# Patient Record
Sex: FEMALE | Race: WHITE
[De-identification: names, ages, dates, MRNs, and addresses within clinical notes are randomized per-mention and may not be internally consistent; named-entity substitution may affect disease eponyms.]

---

## 2018-09-13 ENCOUNTER — HOSPITAL ENCOUNTER (OUTPATIENT)
Dept: HOSPITAL 80 - FSGY | Age: 83
Setting detail: OBSERVATION
LOS: 1 days | Discharge: NURSING, REHAB OR LONG-TERM CARE | End: 2018-09-14
Attending: SURGERY | Admitting: SURGERY
Payer: COMMERCIAL

## 2018-09-13 DIAGNOSIS — H35.30: ICD-10-CM

## 2018-09-13 DIAGNOSIS — E03.9: ICD-10-CM

## 2018-09-13 DIAGNOSIS — I27.20: ICD-10-CM

## 2018-09-13 DIAGNOSIS — G47.33: ICD-10-CM

## 2018-09-13 DIAGNOSIS — R09.02: Primary | ICD-10-CM

## 2018-09-13 DIAGNOSIS — G62.9: ICD-10-CM

## 2018-09-13 DIAGNOSIS — M81.0: ICD-10-CM

## 2018-09-13 DIAGNOSIS — E78.5: ICD-10-CM

## 2018-09-13 DIAGNOSIS — I25.10: ICD-10-CM

## 2018-09-13 DIAGNOSIS — E11.9: ICD-10-CM

## 2018-09-13 DIAGNOSIS — C43.72: ICD-10-CM

## 2018-09-13 DIAGNOSIS — K21.9: ICD-10-CM

## 2018-09-13 DIAGNOSIS — E66.01: ICD-10-CM

## 2018-09-13 DIAGNOSIS — I35.0: ICD-10-CM

## 2018-09-13 PROCEDURE — G0378 HOSPITAL OBSERVATION PER HR: HCPCS

## 2018-09-13 PROCEDURE — 88342 IMHCHEM/IMCYTCHM 1ST ANTB: CPT

## 2018-09-13 PROCEDURE — 78195 LYMPH SYSTEM IMAGING: CPT

## 2018-09-13 PROCEDURE — 38500 BIOPSY/REMOVAL LYMPH NODES: CPT

## 2018-09-13 PROCEDURE — 90686 IIV4 VACC NO PRSV 0.5 ML IM: CPT

## 2018-09-13 PROCEDURE — 93005 ELECTROCARDIOGRAM TRACING: CPT

## 2018-09-13 PROCEDURE — G0008 ADMIN INFLUENZA VIRUS VAC: HCPCS

## 2018-09-13 PROCEDURE — 88341 IMHCHEM/IMCYTCHM EA ADD ANTB: CPT

## 2018-09-13 PROCEDURE — 07BJ0ZX EXCISION OF LEFT INGUINAL LYMPHATIC, OPEN APPROACH, DIAGNOSTIC: ICD-10-PCS | Performed by: SURGERY

## 2018-09-13 PROCEDURE — 11100: CPT

## 2018-09-13 PROCEDURE — 97161 PT EVAL LOW COMPLEX 20 MIN: CPT

## 2018-09-13 PROCEDURE — 88307 TISSUE EXAM BY PATHOLOGIST: CPT

## 2018-09-13 PROCEDURE — 0HBLXZX EXCISION OF LEFT LOWER LEG SKIN, EXTERNAL APPROACH, DIAGNOSTIC: ICD-10-PCS | Performed by: SURGERY

## 2018-09-13 PROCEDURE — A9520 TC99 TILMANOCEPT DIAG 0.5MCI: HCPCS

## 2018-09-13 PROCEDURE — 88305 TISSUE EXAM BY PATHOLOGIST: CPT

## 2018-09-13 PROCEDURE — 97116 GAIT TRAINING THERAPY: CPT

## 2018-09-13 PROCEDURE — 97165 OT EVAL LOW COMPLEX 30 MIN: CPT

## 2018-09-13 NOTE — POSTOPPROG
Post Op Note


Date of Operation: 09/13/18


Surgeon: Shannan Velazquez


Anesthesiologist: dianne


Anesthesia: GET(General Endotracheal)


Pre-op Diagnosis: wide local excision malignant melanoma with sln


Post-op Diagnosis: Same


Indication: 81 yo with melanoma on leg


Procedure: Wide local excision LLE and SLN


Findings: low uptake in SLN


Inf/Abcess present in the surg proc area at time of surgery?: No


EBL: Minimal


Specimen(s): 





LLE and sln

## 2018-09-13 NOTE — POSTANESTH
Post Anesthetic Evaluation


Cardiovascular Status: Normal, Stable


Respiratory Status: Similar to Pre-op Cond., Tx Decrease in SpO2, Requires 

Airway Assist


Pain Control: Adequate, Prn Tx Ordered


Nausea/Vomiting Control: Adequate, Prn Tx Ordered


Complications Possibly Related to Anesthesia: None Noted (Being admitted for 

Oxygen requirement, and untreated KUNAL)

## 2018-09-13 NOTE — PDANEPAE
ANE History of Present Illness





81 YO excision melanoma 





ANE Past Medical History





- Cardiovascular History


Hx Hypertension: Yes


Hx Arrhythmias: No


Hx Chest Pain: Yes


Hx Coronary Artery / Peripheral Vascular Disease: Yes


Hx CHF / Valvular Disease: Yes


Hx Palpitations: No





- Pulmonary History


Hx COPD: No


Hx Asthma/Reactive Airway Disease: Yes


Hx Recent Upper Respiratory Infection: No


Hx Oxygen in Use at Home: No


Hx Sleep Apnea: Yes


Sleep Apnea Screening Result - Last Documented: Negative


Pulmonary History Comment: PULM HTN





- Neurologic History


Hx Cerebrovascular Accident: No


Hx Seizures: No


Hx Dementia: No


Neurologic History Comment: PERIPHERAL NEUROPATHY





- Endocrine History


Hx Diabetes: No


Endocrine History Comment: HYPOTHYROID





- Renal History


Hx Renal Disorders: No





- Liver History


Hx Hepatic Disorders: No





- Neurological & Psychiatric Hx


Hx Neurological and Psychiatric Disorders: Yes


Neurological / Psychiatric History Comment: CYMBALTA FOR NEUROPATHY





- Cancer History


Hx Cancer: No





- Congenital Disorder History


Hx Congenital Disorders: No





- GI History


Hx Gastrointestinal Disorders: No





- Other Health History


Other Health History: NEG





- Chronic Pain History


Chronic Pain: No





- Surgical History


Prior Surgeries: COLTON TKA.  CATARACTS.  ANGIOGRAM W/STENT PLACEMENT





ANE Review of Systems


Review of Systems: 








- Exercise capacity


METS (RN): 2 METS





ANE Patient History





- Allergies


Allergies/Adverse Reactions: 








Penicillins Allergy (Unknown, Verified 09/13/18 09:02)


 








- Home Medications


Home Medications: 








Amitriptyline HCl [Elavil 10 mg (*)] 10 - 20 mg PO HS 08/07/15 [Last Taken 09/12 /18]


Cholecalciferol Vit D3 [Vitamin D3 (*)] 1,000 units PO BID@12,21 08/07/15 [Last 

Taken 09/12/18]


DULoxetine [Cymbalta 60 MG (*)] 60 mg PO HS 08/07/15 [Last Taken 09/12/18]


Levothyroxine [Synthroid 175 mcg (*)] 175 mcg PO DAILY@08 08/07/15 [Last Taken 

09/13/18 07:00]


Lisinopril [Zestril 5 mg (*)] 5 mg PO DAILY@08 08/07/15 [Last Taken 09/13/18 07:

00]


TRAMADOL HCL 50 mg PO TID PRN 07/24/16 [Last Taken 09/03/18]


Aspirin [Aspirin 81mg (*)] 81 mg PO HS 07/26/16 [Last Taken 09/12/18]


Multivitamins [Multivitamin (*)] 1 each PO DAILY@12 07/26/16 [Last Taken 09/12/ 18]


Pravastatin Sodium [Pravachol] 40 mg PO HS 07/26/16 [Last Taken 09/12/18]








- NPO status


NPO Status: no food or drink >8 hours


NPO Since - Liquids (Date): 09/12/18


NPO Since - Liquids (Time): 22:30


NPO Since - Solids (Date): 09/12/18


NPO Since - Solids (Time): 21:00





- Anes Hx


Anes Hx: no prior problems





- Smoking Hx


Smoking Status: Former smoker





- Family Anes Hx


Family Hx Anesthesia Complications: NEG





ANE Labs/Vital Signs





- Vital Signs


Blood Pressure: 133/83


Heart Rate: 57


Respiratory Rate: 16


O2 Sat (%): 87


Height: 5 ft 6 in


Weight: 97.069 kg





ANE Physical Exam





- Airway


Mallampati Score: Class 2


Mouth exam: normal dental/mouth exam





- Pulmonary


Pulmonary: no respiratory distress





- Cardiovascular


Cardiovascular: regular rate and rhythym





- ASA Status


ASA Status: IV





ANE Anesthesia Plan


Anesthesia Plan: GA w LMA

## 2018-09-13 NOTE — PDIAF
- Diagnosis


Diagnosis: malignant melanoma


Code Status: Full Code





- Medication Management


Discharge Medications: 


 Medications to Continue on Transfer





Amitriptyline HCl [Elavil 10 mg (*)] 10 - 20 mg PO HS 08/07/15 [Last Taken 09/12 /18]


Cholecalciferol Vit D3 [Vitamin D3 (*)] 1,000 units PO BID@12,21 08/07/15 [Last 

Taken 09/12/18]


DULoxetine [Cymbalta 60 MG (*)] 60 mg PO HS 08/07/15 [Last Taken 09/12/18]


Levothyroxine [Synthroid 175 mcg (*)] 175 mcg PO DAILY@08 08/07/15 [Last Taken 

09/13/18 07:00]


Lisinopril [Zestril 5 mg (*)] 5 mg PO DAILY@08 08/07/15 [Last Taken 09/13/18 07:

00]


TRAMADOL HCL 50 mg PO TID PRN 07/24/16 [Last Taken 09/03/18]


Aspirin [Aspirin 81mg (*)] 81 mg PO HS 07/26/16 [Last Taken 09/12/18]


Multivitamins [Multivitamin (*)] 1 each PO DAILY@12 07/26/16 [Last Taken 09/12/ 18]


Pravastatin Sodium [Pravachol] 40 mg PO HS 07/26/16 [Last Taken 09/12/18]


Gabapentin [Neurontin 300 MG (*)] 600 mg PO BID #0 cap 07/27/16 [Last Taken 09/ 12/18]


traMADol [Ultram 50 mg (*)] 50 mg PO Q4 PRN #30 tab 09/13/18 [Last Taken Unknown

]








Discharge Medications: Refer to the Discharge Home Medication list for PRN 

reason.





- Orders


Services needed: Home Care, Registered Nurse, Physical Therapy, Occupational 

Therapy


Home Care Face to Face: I certify that this patient was under my care and that 

I had the required face-to-face encounter meeting the encounter requirements on 

the discharge day.  My findings support the fact that the patient is homebound 

as defined in


Home Care Face to Face Continued: CMS Chapter 7 Medicare Benefits Manual 30.1.1

, The condition of the patient is such that there exists a normal inability to 

leave home and consequently, leaving home would require a considerable and 

taxing effort.


Diet Recommendation: no restrictions on diet


Additional Instructions: 


Home health care to assist with dressing changes


Brenda, hydrofera blue, ABD, Zoe and [id number] per week and prn





Continue receiving assistance through [facility] as well for non wound care needs





- Follow Up Care


Current Providers and Referrals: 


Braden Hebert MD [Primary Care Provider] - 


Shannan Velazquez MD [Medical Doctor] - follow up in 1 week

## 2018-09-13 NOTE — CPEKG
Test Reason : OPEN

Blood Pressure : ***/*** mmHG

Vent. Rate : 057 BPM     Atrial Rate : 057 BPM

   P-R Int : 158 ms          QRS Dur : 099 ms

    QT Int : 428 ms       P-R-T Axes : 048 -46 013 degrees

   QTc Int : 417 ms

 

Sinus rhythm

Left anterior fascicular block

Borderline T wave abnormalities

 

Confirmed by Nikita Suggs ([id number]) on 9/13/2018 3:20:21 PM

 

Referred By:             Confirmed By:Nikita Suggs

## 2018-09-13 NOTE — PDHOSCONS
History and Physical





- Chief Complaint


post op 





- History of Present Illness


This is a medicine consultation at the request of Dr. Velazquez for medical 

evaluation and management of multiple medical issues including CAD





This is an 81 yo F who is sp wide excision of melanoma on the lower shin being 

observed overnight post operatively given post op hypoxia and complex medical 

history. Patient is currently mildly somnolent but has no complaints, denies 

pain or shortness of breath. She actually was seen with her NC oxygen blowing 

into her eyes, but denied any shortness of breath and O2 at that time was in 

low 90s. She denies any recent changes in her health, she notes she is hungry 

and eager to go home. 





History Information





- Allergies/Home Medication List


Allergies/Adverse Reactions: 








Penicillins Allergy (Unknown, Verified 09/13/18 09:02)


 





Home Medications: 








Amitriptyline HCl [Elavil 10 mg (*)] 10 mg PO HS 08/07/15 [Last Taken 09/12/18]


Cholecalciferol Vit D3 [Vitamin D3 (*)] 2,000 units PO DAILY 08/07/15 [Last 

Taken 09/12/18]


DULoxetine [Cymbalta 60 MG (*)] 60 mg PO HS 08/07/15 [Last Taken 09/12/18]


Lisinopril [Zestril 5 mg (*)] 5 mg PO DAILY@08 08/07/15 [Last Taken 09/13/18 07:

00]


TRAMADOL HCL 50 mg PO HS PRN 07/24/16 [Last Taken 09/03/18]


Aspirin [Aspirin 81mg (*)] 81 mg PO HS 07/26/16 [Last Taken 09/12/18]


Multivitamins [Multivitamin (*)] 1 each PO DAILY@12 07/26/16 [Last Taken 09/12/ 18]


Pravastatin Sodium [Pravachol] 40 mg PO HS 07/26/16 [Last Taken 09/12/18]


Gabapentin [Neurontin 300 MG (*)] 600 mg PO HS 09/13/18 [Last Taken 09/12/18]


Levothyroxine [Synthroid 150 mcg (*)] 150 mcg PO DAILY06 09/13/18 [Last Taken 

Unknown]





I have personally reviewed and updated: family history, medical history, social 

history, surgical history





- Past Medical History


atrial fibrillation, coronary artery disease (s/p stent to LAD), cancer (

melanoma), COPD, hypertension, hyperlipidemia, osteoporosis (with L1 

compression fx)


Additional medical history: moderate to severe pulm htn.  shaina/ohs.  morbid 

obesity with BMI of 34.  peripheral neuropathy.  macular degeneration/

retinopathy with only 10% of vision.  hypothyroid.  gait instability.  

nephrolithiasis





- Surgical History


Reports: coronary stent


Additional surgical history: TKA.  melanoma excision





- Family History


Positive for: CAD (mother w/cabg at age 55)





- Social History


Smoking Status: Former smoker (25 pack years)


Alcohol Use: None


Drug Use: None


Additional social history: , lives in assisted living





Review of Systems


Review of Systems: 





ROS: 10pt was reviewed & negative except for what was stated in HPI & below





Physical Exam


Physical Exam: 

















Temp Pulse Resp BP Pulse Ox


 


 36.8 C   91   16   107/63   91 L


 


 09/13/18 19:30  09/13/18 19:30  09/13/18 19:30  09/13/18 19:30  09/13/18 19:30




















O2 (L/minute)                  3














Constitutional: no apparent distress, appears nourished


Eyes: anicteric sclera


Ears, Nose, Mouth, Throat: moist mucous membranes, hearing normal


Cardiovascular: regular rate and rhythym, no murmur, rub, or gallop, No edema


Respiratory: no respiratory distress, no rales or rhonchi, clear to auscultation


Gastrointestinal: normoactive bowel sounds, soft, non-tender abdomen


Genitourinary: no bladder tenderness


Skin: warm, normal color


Musculoskeletal: full muscle strength


Neurologic: AAOx3


Psychiatric: interacting appropriately, not anxious, not encephalopathic





Lab Data & Imaging Review














POC Glucose  140 mg/dL ()  H  09/13/18  09:25    








Visualized and Interpreted EKG results: Yes


EKG Interpretation: Positive for: normal sinsus rhythm





Assessment & Plan


Assessment: 





81 yo F with MMI being observed s/p wide excision of lower extremity melanoma

## 2018-09-14 VITALS — SYSTOLIC BLOOD PRESSURE: 116 MMHG | DIASTOLIC BLOOD PRESSURE: 71 MMHG

## 2018-09-14 NOTE — PDIAF
- Diagnosis


Diagnosis: malignant melanoma


Code Status: Full Code





- Medication Management


Discharge Medications: 


 Medications to Continue on Transfer





Amitriptyline HCl [Elavil 10 mg (*)] 10 mg PO HS 08/07/15 [Last Taken 09/12/18]


Cholecalciferol Vit D3 [Vitamin D3 (*)] 2,000 units PO DAILY 08/07/15 [Last 

Taken 09/12/18]


DULoxetine [Cymbalta 60 MG (*)] 60 mg PO HS 08/07/15 [Last Taken 09/12/18]


Lisinopril [Zestril 5 mg (*)] 5 mg PO DAILY@08 08/07/15 [Last Taken 09/13/18 07:

00]


TRAMADOL HCL 50 mg PO HS PRN 07/24/16 [Last Taken 09/03/18]


Aspirin [Aspirin 81mg (*)] 81 mg PO HS 07/26/16 [Last Taken 09/12/18]


Multivitamins [Multivitamin (*)] 1 each PO DAILY@12 07/26/16 [Last Taken 09/12/ 18]


Pravastatin Sodium [Pravachol] 40 mg PO HS 07/26/16 [Last Taken 09/12/18]


Gabapentin [Neurontin 300 MG (*)] 600 mg PO HS 09/13/18 [Last Taken 09/12/18]


Levothyroxine [Synthroid 150 mcg (*)] 150 mcg PO DAILY06 09/13/18 [Last Taken 

Unknown]








Discharge Medications: Refer to the Discharge Home Medication list for PRN 

reason.





- Orders


Services needed: Home Care, Registered Nurse, Physical Therapy, Occupational 

Therapy


Home Care Face to Face: I certify that this patient was under my care and that 

I had the required face-to-face encounter meeting the encounter requirements on 

the discharge day.  My findings support the fact that the patient is homebound 

as defined in


Home Care Face to Face Continued: CMS Chapter 7 Medicare Benefits Manual 30.1.1

, The condition of the patient is such that there exists a normal inability to 

leave home and consequently, leaving home would require a considerable and 

taxing effort.


Diet Recommendation: no restrictions on diet


Diet Texture: Regular Texture Diet


Wound Care Instructions: hernesto hydrofera blue ready, ABD, kerlix, coban. 

Change 2x/week


Additional Instructions: 


Home health care to assist with dressing changes


Hernesto, hydrofera blue, ABD, Zoe and [id number] per week and prn





Continue receiving assistance through [facility] as well for non wound care needs





- Follow Up Care


Current Providers and Referrals: 


Braden Hebert MD [Primary Care Provider] - 


Shannan Velazquez MD [Medical Doctor] - follow up in 2 weeks

## 2018-09-14 NOTE — ASMTCMCOM
CM Note

 

CM Note                       

Notes:

Spoke w/RN, pt is discharging back home to [place] with 's support. She will need wound 

care 3/week, CM sent referral to [facility] and she has been accepted.



DC Plan: Home Care/ [facility] ([facility])

 

Date Signed:  09/14/2018 11:57 AM

Electronically Signed By:Svitlana Garcia RN

## 2018-09-14 NOTE — HOSPPROG
Hospitalist Progress Note


Assessment/Plan: 





82y female post op consult. First encounter, chart reviewed. 





#Hypoxia


-post op, improving





#Hx atrial fibrillation


-stable





#Hx coronary artery disease (s/p stent to LAD)


-stable





# cancer (melanoma)


-removal





#Hx COPD


-cont home meds





#Hx hypertension


-stable





#Hx hyperlipidemia


-cont meds





#Hx osteoporosis (with L1 compression fx)


-no pain 





#moderate to severe pulm htn


-no failure





#shaina/ohs





#morbid obesity with BMI of 34





#peripheral neuropathy


-stable





#macular degeneration/retinopathy with only 10% of vision





#hypothyroid








Subjective: Up in chair. Feeling well. No pain.


Objective: 


 Vital Signs











Temp Pulse Resp BP Pulse Ox


 


 36.4 C   52 L  21 H  111/61   90 L


 


 09/14/18 07:11  09/14/18 07:11  09/14/18 07:11  09/14/18 07:11  09/14/18 08:07








 











 09/13/18 09/14/18 09/15/18





 05:59 05:59 05:59


 


Intake Total  500 


 


Output Total  200 0


 


Balance  300 0














- Physical Exam


Constitutional: appears nourished, chronically ill appearing, obese


Eyes: anicteric sclera, No pale conjunctiva, No scleral injection


Ears, Nose, Mouth, Throat: moist mucous membranes, hearing normal, ears appear 

normal


Cardiovascular: regular rate and rhythym, No JVD, No edema


Respiratory: no respiratory distress, no rales or rhonchi, reduced air movement


Gastrointestinal: normoactive bowel sounds, No tenderness, No ascites


Skin: warm, normal color, other (wound)


Musculoskeletal: normal joint ROM, no joint effusions, generalized weakness


Neurologic: AAOx3


Psychiatric: interacting appropriately, not anxious, not encephalopathic, 

thought process linear





ICD10 Worksheet


Patient Problems: 


 Problems











Problem Status Onset


 


Weakness Acute  


 


Pneumonia Acute  


 


Bronchospasm Acute  


 


Severe sepsis Acute

## 2018-09-14 NOTE — ASMTDCNOTE
Case Management Discharge

 

Discharge Order Complete?     Answers:  Yes                                   

Patient to Obtain             Answers:  Independently                         

Medications                                                                   

Transportation Arranged       Answers:  Family/Friends                        

Faxed Final Orders            Answers:  Yes                                   

Agency/Facility Transfer      Answers:  Yes                                   

Report Printed & Faxed to                                                     

Receiving Agency                                                              

Family Notified               Answers:  Yes                                   

Discharge Comments            

Notes:

D/w MD, final orders faxed. Jackie goodman [place] notified. Pt to follow up with MD in 2 weeks.

 

Date Signed:  09/14/2018 05:01 PM

Electronically Signed By:Svitlana Garcia RN

## 2018-09-14 NOTE — SOAPPROG
SOAP Progress Note


Assessment/Plan: 


Assessment/Plan: 81yo F POD#1 s/p WLE L shin and sentinel L inguinal lymph node 

biopsy


Path pending


Supplemental O2 post-op, wean prior to DC


No pain


Wound care - suture removal 2 weeks. Brenda, HFB ready, ABD, kerlix, coban 


DC: home with home wound care RN. 





S: feels well. no pain at surgical site. Does not use oxygen at home. 


O: Laying in bed, comfortable, NAD


No increased WOB. supplemental O2 0.5L


1+ edema BLE


LLE outer bandage removed/replaced. No active bleeding. No surrounding erythema.





Objective: 





 Vital Signs











Temp Pulse Resp BP Pulse Ox


 


 36.7 C   57 L  18   116/71   90 L


 


 09/14/18 15:49  09/14/18 15:49  09/14/18 15:49  09/14/18 15:49  09/14/18 15:49








 











 09/13/18 09/14/18 09/15/18





 05:59 05:59 05:59


 


Intake Total  500 940


 


Output Total  200 400


 


Balance  300 540














ICD10 Worksheet


Patient Problems: 


 Problems











Problem Status Onset


 


Bronchospasm Acute  


 


Pneumonia Acute  


 


Severe sepsis Acute  


 


Weakness Acute

## 2018-09-18 NOTE — GDS
This is This is a discharge summary on



ADMITTING DIAGNOSIS:  Left lower extremity malignant melanoma.



SECONDARY DIAGNOSES:  

1.  Neuropathy of unknown etiology.  

2.  Blindness. 

3.  Aortic stenosis.

4.  Coronary artery disease.

5.  Gastroesophageal reflux disease.

6.  Hyperlipidemia.

7.  Hypothyroidism.

8.  Pulmonary hypertension.

9.  Sleep apnea.

10.  Type 2 diabetes.



REASON FOR ADMISSION:  An 82-year-old woman with a new diagnosis of malignant 
melanoma of her left lower extremity, who presents this time for surgical 
intervention.



HOSPITAL COURSE:  She was taken to the operating room by Dr. Velazquez on 09/13/2018
, for wide local excision of the left lower extremity with a sentinel lymph 
node biopsy of the left groin.  At the time of surgery, there was low uptake of 
the sentinel lymph node.  At the time of dictation pathology is pending.  She 
was kept overnight for hypoxia, pain control, and observation.  By 
postoperative day #1 her pain was well controlled, tolerating regular diet.  
Her oxygen was weaned and she was ready for discharge.



DISCHARGE CONDITION:  She is being discharged back to her assisted living with 
her  in stable condition with home care for wound care.



DISCHARGE MEDICATIONS:  She was instructed to resume home medications.  Please 
see EMR for further details.



DISCHARGE INSTRUCTIONS AND FOLLOWUP:  She had Home Care ordered to assist with 
dressing changes of Brenda, Hydrofera Blue, ABD, Zoe, and Coban 3 times per 
week and as needed.  She will return to the clinic in 2 weeks for postop check.
  Call with worsening symptoms, questions or concerns.





Job #:  [id number]

MTDD

## 2018-09-20 ENCOUNTER — HOSPITAL ENCOUNTER (INPATIENT)
Dept: HOSPITAL 80 - FED | Age: 83
LOS: 8 days | Discharge: NURSING, REHAB OR LONG-TERM CARE | DRG: 862 | End: 2018-09-28
Attending: INTERNAL MEDICINE | Admitting: INTERNAL MEDICINE
Payer: COMMERCIAL

## 2018-09-20 DIAGNOSIS — I89.0: ICD-10-CM

## 2018-09-20 DIAGNOSIS — G93.40: ICD-10-CM

## 2018-09-20 DIAGNOSIS — E11.40: ICD-10-CM

## 2018-09-20 DIAGNOSIS — E03.9: ICD-10-CM

## 2018-09-20 DIAGNOSIS — I10: ICD-10-CM

## 2018-09-20 DIAGNOSIS — I48.91: ICD-10-CM

## 2018-09-20 DIAGNOSIS — Z95.5: ICD-10-CM

## 2018-09-20 DIAGNOSIS — Z96.659: ICD-10-CM

## 2018-09-20 DIAGNOSIS — J44.9: ICD-10-CM

## 2018-09-20 DIAGNOSIS — E66.01: ICD-10-CM

## 2018-09-20 DIAGNOSIS — L03.115: ICD-10-CM

## 2018-09-20 DIAGNOSIS — B95.61: ICD-10-CM

## 2018-09-20 DIAGNOSIS — I25.10: ICD-10-CM

## 2018-09-20 DIAGNOSIS — Z87.891: ICD-10-CM

## 2018-09-20 DIAGNOSIS — N17.9: ICD-10-CM

## 2018-09-20 DIAGNOSIS — J81.0: ICD-10-CM

## 2018-09-20 DIAGNOSIS — E78.5: ICD-10-CM

## 2018-09-20 DIAGNOSIS — H35.30: ICD-10-CM

## 2018-09-20 DIAGNOSIS — T81.4XXA: Primary | ICD-10-CM

## 2018-09-20 DIAGNOSIS — Z87.442: ICD-10-CM

## 2018-09-20 LAB — PLATELET # BLD: 261 10^3/UL (ref 150–400)

## 2018-09-20 NOTE — EDPHY
H & P


Stated Complaint: increased fatigue today


Source: Patient, Family





- Personal History


Current Tetanus Diphtheria and Acellular Pertussis (TDAP): Unsure


Tetanus Vaccine Date: <10 years





- Medical/Surgical History


Hx Asthma: No


Hx Chronic Respiratory Disease: No


Hx Diabetes: No


Hx Cardiac Disease: No


Hx Renal Disease: No


Hx Cirrhosis: No


Hx Alcoholism: No


Hx HIV/AIDS: No


Hx Splenectomy or Spleen Trauma: No


Other PMH: hyperlipidemia, HTN, hypothyroid, cardiac stent, melanoma, 

peripheral neuropathy





- Social History


Smoking Status: Former smoker


Time Seen by Provider: 09/20/18 21:31


HPI/ROS: 





CHIEF COMPLAINT:  Weakness, fatigue





HISTORY OF PRESENT ILLNESS:  The patient is brought in by her family with 

complaints of weakness and fatigue today.  She has a history of chronic 

neuropathy.  She is on a number of pain medications.  She did receive tramadol 

last night at about 2 o'clock in the morning.  Family reports that today the 

patient has been more confused and globally weak.  She is unable to walk and 

she usually would.  The patient was noted to have a dysconjugate gaze according 

to her daughter earlier this evening.  The patient is chronically blind.  The 

patient also has a history of recently diagnosed melanoma on her left leg which 

was surgically resected last week.  The patient denies any acute abdominal pain

, vomiting or diarrhea.





REVIEW OF SYSTEMS:


A comprehensive 10 point review of systems is otherwise negative aside from 

elements mentioned in the history of present illness. (Skyler Wilkins)





- Physical Exam


Exam: 





General Appearance:  Elderly female, no acute distress


Eyes:  Chronic blindness


ENT, Mouth:  Dry mucous membranes


Respiratory:  There are no retractions, lungs are clear to auscultation


Cardiovascular:  Regular rate and rhythm


Gastrointestinal:  Abdomen is soft and nontender, no masses, bowel sounds normal


Neurological:  A&O, normal motor function, normal sensory exam, normal cranial 

nerves


Skin:  Warm and dry, no rashes


Musculoskeletal:  Neck is supple nontender


Extremities:  Surgical incision on left leg clean dry and intact


Psychiatric:  Patient is oriented X 3, there is no agitation (Skyler Wilkins)


Constitutional: 


 Initial Vital Signs











Temperature (C)  36.7 C   09/20/18 21:25


 


Heart Rate  93   09/20/18 21:25


 


Respiratory Rate  18   09/20/18 21:25


 


Blood Pressure  115/76   09/20/18 21:25


 


O2 Sat (%)  94   09/20/18 21:25








 











O2 Delivery Mode               Nasal Cannula


 


O2 (L/minute)                  2














Allergies/Adverse Reactions: 


 





Penicillins Allergy (Unknown, Verified 09/13/18 09:02)


 








Home Medications: 














 Medication  Instructions  Recorded


 


Amitriptyline HCl [Elavil 10 mg 10 mg PO HS 08/07/15





(*)]  


 


Cholecalciferol Vit D3 [Vitamin D3 2,000 units PO DAILY 08/07/15





(*)]  


 


DULoxetine [Cymbalta 60 MG (*)] 60 mg PO HS 08/07/15


 


Lisinopril [Zestril 5 mg (*)] 5 mg PO DAILY@08 08/07/15


 


TRAMADOL HCL 50 mg PO HS PRN 07/24/16


 


Aspirin [Aspirin 81mg (*)] 81 mg PO HS 07/26/16


 


Multivitamins [Multivitamin (*)] 1 each PO DAILY@12 07/26/16


 


Pravastatin Sodium [Pravachol] 40 mg PO HS 07/26/16


 


Gabapentin [Neurontin 300 MG (*)] 600 mg PO HS 09/13/18


 


Levothyroxine [Synthroid 150 mcg 150 mcg PO DAILY06 09/13/18





(*)]  














Medical Decision Making





- Diagnostics


EKG Interpretation: 





ECG time 9:58 p.m..  Sinus rhythm with a rate of 90, there is a left anterior 

fascicular block.  Intervals are normal.  No acute ST or T-wave changes. (Ike Barriga)


Imaging Results: 


 Imaging Impressions





Chest X-Ray  09/20/18 22:04


Impression: Airways disease and left basilar atelectasis.











Other Provider: 





22:00  care assumed from Dr. Wilkins pending laboratory evaluation and other 

studies to determine the cause of the patient's increasing weakness today.





23:45  patient's chest x-ray shows airspace disease, no focal infiltrate.  Does 

have some atelectasis.  Urinalysis is negative for UTI.  There is some evidence 

of renal insufficiency with creatinine 1.4.  I suspect the patient is not 

adequately hydrating.  She does have a leukocytosis but no focal source of 

infection at this time in her chest or her urine.  Will take down the dressing 

of her recent melanoma excision.





I have examined the patient is incision site.  There is a copious amount of 

purulent discharge with surrounding erythema.  This is likely the source of her 

leukocytosis and her increasing weakness.  I have discussed with Dr. Brito, on-

call for Dr. Velazquez.  He will consult on the patient.  I discussed with Dr. Schultz, hospitalist.  He will admit the patient to his service.  Patient does 

meet SIRS criteria.  A lactic acid has been sent in addition to blood cultures 

and wound cultures.  Lactic acid is unremarkable.  Patient has received Ancef 

IV.  Patient be admitted to the floor.   (Ike Barriga)





- Data Points


Laboratory Results: 


 Laboratory Results





 09/20/18 21:35 





 09/20/18 21:35 





 











  09/20/18 09/20/18 09/20/18





  23:05 21:35 21:35


 


WBC      





    


 


RBC      





    


 


Hgb      





    


 


Hct      





    


 


MCV      





    


 


MCH      





    


 


MCHC      





    


 


RDW      





    


 


Plt Count      





    


 


MPV      





    


 


Neut % (Auto)      





    


 


Lymph % (Auto)      





    


 


Mono % (Auto)      





    


 


Eos % (Auto)      





    


 


Baso % (Auto)      





    


 


Nucleat RBC Rel Count      





    


 


Absolute Neuts (auto)      





    


 


Absolute Lymphs (auto)      





    


 


Absolute Monos (auto)      





    


 


Absolute Eos (auto)      





    


 


Absolute Basos (auto)      





    


 


Absolute Nucleated RBC      





    


 


Immature Gran %      





    


 


Immature Gran #      





    


 


Sodium      134 mEq/L L mEq/L





     (135-145) 


 


Potassium      4.3 mEq/L mEq/L





     (3.3-5.0) 


 


Chloride      93 mEq/L L mEq/L





     () 


 


Carbon Dioxide      27 mEq/l mEq/l





     (22-31) 


 


Anion Gap      14 mEq/L mEq/L





     (8-16) 


 


BUN      22 mg/dL mg/dL





     (7-23) 


 


Creatinine      1.4 mg/dL H mg/dL





     (0.6-1.0) 


 


Estimated GFR      36 





    


 


Glucose      176 mg/dL H mg/dL





     () 


 


Calcium      9.5 mg/dL mg/dL





     (8.5-10.4) 


 


Total Bilirubin    1.5 mg/dL H mg/dL  





    (0.1-1.4)  


 


Conjugated Bilirubin    0.5 mg/dL mg/dL  





    (0.0-0.5)  


 


Unconjugated Bilirubin    1.0 mg/dL mg/dL  





    (0.0-1.1)  


 


AST    31 IU/L IU/L  





    (14-46)  


 


ALT    37 IU/L IU/L  





    (9-52)  


 


Alkaline Phosphatase    108 IU/L IU/L  





    ()  


 


Total Protein    7.8 g/dL g/dL  





    (6.3-8.2)  


 


Albumin    4.4 g/dL g/dL  





    (3.5-5.0)  


 


Urine Color  BRIELLE     





    


 


Urine Appearance  MODERATELY TURBID     





    


 


Urine pH  5.0     





   (5.0-7.5)   


 


Ur Specific Gravity  1.027     





   (1.002-1.030)   


 


Urine Protein  1+  H     





   (NEGATIVE)   


 


Urine Ketones  NEGATIVE     





   (NEGATIVE)   


 


Urine Blood  NEGATIVE     





   (NEGATIVE)   


 


Urine Nitrate  NEGATIVE     





   (NEGATIVE)   


 


Urine Bilirubin  NEGATIVE     





   (NEGATIVE)   


 


Urine Urobilinogen  4.0 EU H EU    





   (0.2-1.0)   


 


Ur Leukocyte Esterase  NEGATIVE     





   (NEGATIVE)   


 


Urine RBC  1-3 /hpf /hpf    





   (0-3)   


 


Urine WBC  1-3 /hpf /hpf    





   (0-3)   


 


Ur Epithelial Cells  1+ /lpf /lpf    





   (NONE-1+)   


 


Hyaline Casts  1-5 /lpf /lpf    





   (0-1)   


 


Urine Mucus  4+ /lpf H /lpf    





   (NONE-1+)   


 


Urine Glucose  NEGATIVE     





   (NEGATIVE)   














  09/20/18





  21:35


 


WBC  16.37 10^3/uL H 10^3/uL





   (3.80-9.50) 


 


RBC  5.71 10^6/uL H 10^6/uL





   (4.18-5.33) 


 


Hgb  17.6 g/dL H g/dL





   (12.6-16.3) 


 


Hct  52.8 % H %





   (38.0-47.0) 


 


MCV  92.5 fL fL





   (81.5-99.8) 


 


MCH  30.8 pg pg





   (27.9-34.1) 


 


MCHC  33.3 g/dL g/dL





   (32.4-36.7) 


 


RDW  14.6 % %





   (11.5-15.2) 


 


Plt Count  261 10^3/uL 10^3/uL





   (150-400) 


 


MPV  8.6 fL L fL





   (8.7-11.7) 


 


Neut % (Auto)  77.1 % H %





   (39.3-74.2) 


 


Lymph % (Auto)  12.9 % L %





   (15.0-45.0) 


 


Mono % (Auto)  7.4 % %





   (4.5-13.0) 


 


Eos % (Auto)  1.2 % %





   (0.6-7.6) 


 


Baso % (Auto)  0.5 % %





   (0.3-1.7) 


 


Nucleat RBC Rel Count  0.0 % %





   (0.0-0.2) 


 


Absolute Neuts (auto)  12.61 10^3/uL H 10^3/uL





   (1.70-6.50) 


 


Absolute Lymphs (auto)  2.11 10^3/uL 10^3/uL





   (1.00-3.00) 


 


Absolute Monos (auto)  1.21 10^3/uL H 10^3/uL





   (0.30-0.80) 


 


Absolute Eos (auto)  0.20 10^3/uL 10^3/uL





   (0.03-0.40) 


 


Absolute Basos (auto)  0.09 10^3/uL 10^3/uL





   (0.02-0.10) 


 


Absolute Nucleated RBC  0.00 10^3/uL 10^3/uL





   (0-0.01) 


 


Immature Gran %  0.9 % %





   (0.0-1.1) 


 


Immature Gran #  0.15 10^3/uL H 10^3/uL





   (0.00-0.10) 


 


Sodium  





  


 


Potassium  





  


 


Chloride  





  


 


Carbon Dioxide  





  


 


Anion Gap  





  


 


BUN  





  


 


Creatinine  





  


 


Estimated GFR  





  


 


Glucose  





  


 


Calcium  





  


 


Total Bilirubin  





  


 


Conjugated Bilirubin  





  


 


Unconjugated Bilirubin  





  


 


AST  





  


 


ALT  





  


 


Alkaline Phosphatase  





  


 


Total Protein  





  


 


Albumin  





  


 


Urine Color  





  


 


Urine Appearance  





  


 


Urine pH  





  


 


Ur Specific Gravity  





  


 


Urine Protein  





  


 


Urine Ketones  





  


 


Urine Blood  





  


 


Urine Nitrate  





  


 


Urine Bilirubin  





  


 


Urine Urobilinogen  





  


 


Ur Leukocyte Esterase  





  


 


Urine RBC  





  


 


Urine WBC  





  


 


Ur Epithelial Cells  





  


 


Hyaline Casts  





  


 


Urine Mucus  





  


 


Urine Glucose  





  











Medications Given: 


 





Cefazolin Sodium/Dextrose (Ancef 2 Gm)  100 mls @ 200 mls/hr IV Q8H PRIYANKA


   PRN Reason: Protocol


   Stop: 10/21/18 00:59


   Last Admin: 09/21/18 01:39 Dose:  100 mls





Discontinued Medications





Sodium Chloride (Ns)  1,000 mls @ 0 mls/hr IV ONCE ONE


   PRN Reason: Wide Open


   Stop: 09/21/18 01:00


   Last Admin: 09/21/18 01:39 Dose:  1,000 mls








Departure





- Departure


Disposition: [facility]


Clinical Impression: 


 Wound infection, Weakness





Condition: Fair

## 2018-09-21 LAB
INR PPP: 1.22 (ref 0.83–1.16)
PLATELET # BLD: 210 10^3/UL (ref 150–400)
PROTHROMBIN TIME: 15.6 SEC (ref 12–15)

## 2018-09-21 RX ADMIN — ASPIRIN 81 MG SCH MG: 81 TABLET ORAL at 21:57

## 2018-09-21 RX ADMIN — Medication SCH MLS: at 09:17

## 2018-09-21 RX ADMIN — AMITRIPTYLINE HYDROCHLORIDE SCH MG: 10 TABLET, FILM COATED ORAL at 23:03

## 2018-09-21 RX ADMIN — VANCOMYCIN HYDROCHLORIDE SCH MLS: 1 INJECTION, POWDER, LYOPHILIZED, FOR SOLUTION INTRAVENOUS at 15:47

## 2018-09-21 RX ADMIN — ACETAMINOPHEN PRN MG: 325 TABLET ORAL at 13:22

## 2018-09-21 RX ADMIN — GABAPENTIN SCH MG: 300 CAPSULE ORAL at 21:58

## 2018-09-21 RX ADMIN — PRAVASTATIN SODIUM SCH MG: 40 TABLET ORAL at 21:58

## 2018-09-21 RX ADMIN — SODIUM CHLORIDE SCH MLS: 900 INJECTION, SOLUTION INTRAVENOUS at 15:56

## 2018-09-21 RX ADMIN — Medication SCH MLS: at 01:39

## 2018-09-21 NOTE — HOSPPROG
Hospitalist Progress Note


Assessment/Plan: 


Prolonged service, direct patient care, in addition to the time spent by the 

original history and physical by Dr. Schultz, face-to-face with the patient 

and on 2nd encounter with her , at bedside, for 35 total minutes of time 

spent between 9:45 a.m. - 10:00 a.m., and 3:10 p.m. - 3:40 p.m., addressing the 

following:





-patient originally remained significantly lethargic, but was fully oriented, 

most likely secondary to 2 doses of tramadol administered by the patient's 

 on the evening of presentation, to treat left lower extremity pain


-on that original exam, the patient irregular heart rate, clear breath sounds 

bilaterally, covered wound on her left lower extremity, bowel sounds present, 

and reported that she otherwise felt well


-explained the the plan including ongoing IV Ancef, ongoing wound assessment, 

labs are pending at that time


-discussed with Dr. Velazquez, she reported to me that the wound bed appeared to be 

healing but the surrounding area certainly appeared cellulitic, Xeroform Blue 

placed in the wound bed





-patient persistently febrile this afternoon, evaluated the patient at bedside 

with her  present, she appears sweaty, mildly tachypneic, denies any 

cough, denies any pain in the left lower extremity


-she is alert awake oriented x3, and although she appears somewhat anxious, her 

concentration 7/7


-I am concerned that the patient is developing worsening systemic infection, 

although she does not currently meet sepsis criteria, and given that her white 

blood cell count has risen to 21,000 despite adequate dosing of IV Ancef and 

she is currently febrile, I am concerned that she may have MRSA and I recommend 

to the patient her  that we escalate the antibiotic coverage to 

vancomycin 1.5 g twice daily, initiate IV fluids to cover insensible losses, 

monitor white blood cell count closely, upgraded to inpatient admission status 

reasonable medical necessity as her anticipated length stay is greater than 48 

hr for worsening cellulitis not responding to IV Ancef therapy, requiring 

broadening of antibiotic therapy and close surgical reassessment


-treat supportively with Tylenol, 1 dose of ibuprofen now, ice pack, cool cloth





Objective: 


 Vital Signs











Temp Pulse Resp BP Pulse Ox


 


 38.6 C H  90   28 H  116/71   88 L


 


 09/21/18 15:14  09/21/18 15:14  09/21/18 15:14  09/21/18 15:14  09/21/18 15:14








 Laboratory Results





 09/21/18 07:50 





 09/21/18 07:50 





 











 09/20/18 09/21/18 09/22/18





 05:59 05:59 05:59


 


Intake Total  1105 


 


Output Total  50 


 


Balance  1055 








 











PT  15.6 SEC (12.0-15.0)  H  09/21/18  01:01    


 


INR  1.22  (0.83-1.16)  H  09/21/18  01:01    














ICD10 Worksheet


Patient Problems: 


 Problems











Problem Status Onset


 


Weakness Acute  


 


Pneumonia Acute  


 


Bronchospasm Acute  


 


Severe sepsis Acute  


 


Wound infection Acute

## 2018-09-21 NOTE — PDGENHP
History and Physical





- Chief Complaint


Malaise





- History of Present Illness


81 yo F w/ hx of COPD, HTN, CAD, and LLE melanoma with recent excision presents 

with malaise. Patient is mildly confused at the time of my evaluation and 

obtaining a history is difficult. She is able to tell me that she came to the 

hospital today because she felt unwell. She cannot provide any further 

specifics. She denies fever, chills, chest pain, shortness of breath, dysuria, 

and LLE pain. She is A&Ox2 during my evaluation and is displaying 

inattentiveness during our conversation, frequently unable to finish sentences 

that she has started.





In the ED her LLE wound appears infected. Surgical sutures remain in place but 

wound bed shows purulence and surrounding erythema. Evaluation in the ED is 

notable for leukocytosis but otherwise mostly unremarkable.





Case discussed with ED physician Dr. Barriga; records reviewed in EMR.





History Information





- Allergies/Home Medication List


Allergies/Adverse Reactions: 








Penicillins Allergy (Unknown, Verified 09/13/18 09:02)


 





Home Medications: 








Amitriptyline HCl [Elavil 10 mg (*)] 10 mg PO HS 08/07/15 [Last Taken 09/12/18]


Cholecalciferol Vit D3 [Vitamin D3 (*)] 2,000 units PO DAILY 08/07/15 [Last 

Taken 09/12/18]


DULoxetine [Cymbalta 60 MG (*)] 60 mg PO HS 08/07/15 [Last Taken 09/12/18]


Lisinopril [Zestril 5 mg (*)] 5 mg PO DAILY@08 08/07/15 [Last Taken 09/13/18 07:

00]


TRAMADOL HCL 50 mg PO HS PRN 07/24/16 [Last Taken 09/03/18]


Aspirin [Aspirin 81mg (*)] 81 mg PO HS 07/26/16 [Last Taken 09/12/18]


Multivitamins [Multivitamin (*)] 1 each PO DAILY@12 07/26/16 [Last Taken 09/12/ 18]


Pravastatin Sodium [Pravachol] 40 mg PO HS 07/26/16 [Last Taken 09/12/18]


Gabapentin [Neurontin 300 MG (*)] 600 mg PO HS 09/13/18 [Last Taken 09/12/18]


Levothyroxine [Synthroid 150 mcg (*)] 150 mcg PO DAILY06 09/13/18 [Last Taken 

Unknown]





I have personally reviewed and updated: family history, medical history





- Past Medical History


atrial fibrillation, coronary artery disease (s/p stent to LAD), cancer (

melanoma), COPD, hypertension, hyperlipidemia, osteoporosis (with L1 

compression fx)


Additional medical history: moderate to severe pulm htn.  shaina/ohs.  morbid 

obesity with BMI of 34.  peripheral neuropathy.  macular degeneration/

retinopathy with only 10% of vision.  hypothyroid.  gait instability.  

nephrolithiasis





- Surgical History


Reports: coronary stent


Additional surgical history: TKA.  melanoma excision





- Family History


Positive for: CAD (mother w/cabg at age 55)





- Social History


Smoking Status: Former smoker


Additional social history: , lives in assisted living





Review of Systems


Review of Systems: 





ROS: 10pt was reviewed & negative except for what was stated in HPI & below





Physical Exam


Physical Exam: 

















Temp Pulse Resp BP Pulse Ox


 


 36.7 C   90   22 H  115/64   92 


 


 09/20/18 21:25  09/20/18 23:26  09/20/18 23:26  09/20/18 23:26  09/20/18 23:26











Constitutional: chronically ill appearing, obese


Eyes: PERRL, EOMI


Ears, Nose, Mouth, Throat: moist mucous membranes, no oral mucosal ulcers


Cardiovascular: regular rate and rhythym, systolic murmur


Respiratory: no respiratory distress, clear to auscultation


Gastrointestinal: normoactive bowel sounds, soft, non-tender abdomen


Skin: warm, other (LLE surgical wound, partially close with sutures in place. 

Wound bed w/ purulence and surrounding erythema)


Musculoskeletal: full muscle strength, no muscle tenderness


Neurologic: AAOx3, CN II-XII Intact


Psychiatric: encephalopathic, poor memory





Lab Data & Imaging Review





 09/20/18 21:35





 09/20/18 21:35














WBC  16.37 10^3/uL (3.80-9.50)  H  09/20/18  21:35    


 


RBC  5.71 10^6/uL (4.18-5.33)  H  09/20/18  21:35    


 


Hgb  17.6 g/dL (12.6-16.3)  H  09/20/18  21:35    


 


Hct  52.8 % (38.0-47.0)  H  09/20/18  21:35    


 


MCV  92.5 fL (81.5-99.8)   09/20/18  21:35    


 


MCH  30.8 pg (27.9-34.1)   09/20/18  21:35    


 


MCHC  33.3 g/dL (32.4-36.7)   09/20/18  21:35    


 


RDW  14.6 % (11.5-15.2)   09/20/18  21:35    


 


Plt Count  261 10^3/uL (150-400)   09/20/18  21:35    


 


MPV  8.6 fL (8.7-11.7)  L  09/20/18  21:35    


 


Neut % (Auto)  77.1 % (39.3-74.2)  H  09/20/18  21:35    


 


Lymph % (Auto)  12.9 % (15.0-45.0)  L  09/20/18  21:35    


 


Mono % (Auto)  7.4 % (4.5-13.0)   09/20/18  21:35    


 


Eos % (Auto)  1.2 % (0.6-7.6)   09/20/18  21:35    


 


Baso % (Auto)  0.5 % (0.3-1.7)   09/20/18  21:35    


 


Nucleat RBC Rel Count  0.0 % (0.0-0.2)   09/20/18  21:35    


 


Absolute Neuts (auto)  12.61 10^3/uL (1.70-6.50)  H  09/20/18  21:35    


 


Absolute Lymphs (auto)  2.11 10^3/uL (1.00-3.00)   09/20/18  21:35    


 


Absolute Monos (auto)  1.21 10^3/uL (0.30-0.80)  H  09/20/18  21:35    


 


Absolute Eos (auto)  0.20 10^3/uL (0.03-0.40)   09/20/18  21:35    


 


Absolute Basos (auto)  0.09 10^3/uL (0.02-0.10)   09/20/18  21:35    


 


Absolute Nucleated RBC  0.00 10^3/uL (0-0.01)   09/20/18  21:35    


 


Immature Gran %  0.9 % (0.0-1.1)   09/20/18  21:35    


 


Immature Gran #  0.15 10^3/uL (0.00-0.10)  H  09/20/18  21:35    


 


Sodium  134 mEq/L (135-145)  L  09/20/18  21:35    


 


Potassium  4.3 mEq/L (3.3-5.0)   09/20/18  21:35    


 


Chloride  93 mEq/L ()  L  09/20/18  21:35    


 


Carbon Dioxide  27 mEq/l (22-31)   09/20/18  21:35    


 


Anion Gap  14 mEq/L (8-16)   09/20/18  21:35    


 


BUN  22 mg/dL (7-23)   09/20/18  21:35    


 


Creatinine  1.4 mg/dL (0.6-1.0)  H  09/20/18  21:35    


 


Estimated GFR  36   09/20/18  21:35    


 


Glucose  176 mg/dL ()  H  09/20/18  21:35    


 


Calcium  9.5 mg/dL (8.5-10.4)   09/20/18  21:35    


 


Urine Color  BRIELLE   09/20/18  23:05    


 


Urine Appearance  MODERATELY TURBID   09/20/18  23:05    


 


Urine pH  5.0  (5.0-7.5)   09/20/18  23:05    


 


Ur Specific Gravity  1.027  (1.002-1.030)   09/20/18  23:05    


 


Urine Protein  1+  (NEGATIVE)  H  09/20/18  23:05    


 


Urine Ketones  NEGATIVE  (NEGATIVE)   09/20/18  23:05    


 


Urine Blood  NEGATIVE  (NEGATIVE)   09/20/18  23:05    


 


Urine Nitrate  NEGATIVE  (NEGATIVE)   09/20/18  23:05    


 


Urine Bilirubin  NEGATIVE  (NEGATIVE)   09/20/18  23:05    


 


Urine Urobilinogen  4.0 EU (0.2-1.0)  H  09/20/18  23:05    


 


Ur Leukocyte Esterase  NEGATIVE  (NEGATIVE)   09/20/18  23:05    


 


Urine RBC  1-3 /hpf (0-3)   09/20/18  23:05    


 


Urine WBC  1-3 /hpf (0-3)   09/20/18  23:05    


 


Ur Epithelial Cells  1+ /lpf (NONE-1+)   09/20/18  23:05    


 


Hyaline Casts  1-5 /lpf (0-1)   09/20/18  23:05    


 


Urine Mucus  4+ /lpf (NONE-1+)  H  09/20/18  23:05    


 


Urine Glucose  NEGATIVE  (NEGATIVE)   09/20/18  23:05    








Imaging Review: 





 Imaging Impressions





Chest X-Ray  09/20/18 22:04


Impression: Airways disease and left basilar atelectasis.














Assessment & Plan


Assessment: 





81 yo F w/ hx of COPD, HTN, CAD, and LLE melanoma with recent excision presents 

with malaise and likely wound infection.


Plan: 


1. LLE surgical site infection - Wound bed with purulence and surrounding 

erythema; overall this appears mild at this time with elevated WBC but no clear 

sepsis physiology at this time. She underwent excision of LLE melanoma on 9/13 

with Dr. Velazquez.


- Cefazolin 2g IV q8h


- Surgery service consulted, appreciate assistance


- Blood and wound cultures pending


2. Acute, metabolic encephalopathy - Presumably 2/2 #1; patient A&Ox2 on my 

evaluation and showing decreased attention and memory.


- Acute management as above


- Avoid centrally acting medications


3. COPD - No evidence of acute exacerbation.


4. CAD - With hx of prior LAD stent; ECG(personally interpreted) without signs 

of acute ischemia.


5. HTN - Continue home medications pending reconciliation


6. Macular degeneration - With poor vision at baseline





Diet - NPO pending surgical evaluation


Code - Full


Ppx - SCDs


Dispo - Admit under observation status

## 2018-09-21 NOTE — SOAPPROG
SOAP Progress Note


Assessment/Plan: 


Assessment/Plan: 83yo F s/p L inguinal SLN bx and WLE of LLE for melanoma. 

Admitted with cellulitis LLE


Path with clear margins


IV antibiotics


Wound care - hernesto, hydrofera blue ready, absorptive outer bandage


Will follow daily


No OR debridement at this time - regular diet ok


Appreciate hospitalist management of comorbidities


Full consult note to follow





S: hungry and thirsty. Sick of being in the hospital!


O: laying in bed, comfortable, NAD


No increased WOB


3+ PE LLE


Wound with pale granulation tissue present. Sutures intact. Surrounding 

erythema and warmth extending to level of knee


+DP and PT pulses LLE


Objective: 





 Vital Signs











Temp Pulse Resp BP Pulse Ox


 


 37.5 C   77   16   121/80 H  92 


 


 09/21/18 07:42  09/21/18 07:42  09/21/18 07:42  09/21/18 07:42  09/21/18 07:42








 Laboratory Results





 09/21/18 07:50 





 09/21/18 07:50 





 











 09/20/18 09/21/18 09/22/18





 05:59 05:59 05:59


 


Intake Total  1105 


 


Output Total  50 


 


Balance  1055 








 











PT  15.6 SEC (12.0-15.0)  H  09/21/18  01:01    


 


INR  1.22  (0.83-1.16)  H  09/21/18  01:01    














ICD10 Worksheet


Patient Problems: 


 Problems











Problem Status Onset


 


Weakness Acute  


 


Wound infection Acute  


 


Bronchospasm Acute  


 


Pneumonia Acute  


 


Severe sepsis Acute

## 2018-09-22 LAB — PLATELET # BLD: 231 10^3/UL (ref 150–400)

## 2018-09-22 RX ADMIN — VITAMIN D, TAB 1000IU (100/BT) SCH UNITS: 25 TAB at 10:16

## 2018-09-22 RX ADMIN — ASPIRIN 81 MG SCH MG: 81 TABLET ORAL at 21:30

## 2018-09-22 RX ADMIN — Medication SCH MLS: at 21:30

## 2018-09-22 RX ADMIN — ACETAMINOPHEN PRN MG: 325 TABLET ORAL at 14:00

## 2018-09-22 RX ADMIN — THERA TABS SCH EACH: TAB at 10:16

## 2018-09-22 RX ADMIN — AMITRIPTYLINE HYDROCHLORIDE SCH MG: 10 TABLET, FILM COATED ORAL at 21:30

## 2018-09-22 RX ADMIN — PRAVASTATIN SODIUM SCH MG: 40 TABLET ORAL at 21:30

## 2018-09-22 RX ADMIN — LEVOTHYROXINE SODIUM SCH MCG: 150 TABLET ORAL at 05:18

## 2018-09-22 RX ADMIN — GABAPENTIN SCH MG: 300 CAPSULE ORAL at 21:30

## 2018-09-22 RX ADMIN — SODIUM CHLORIDE SCH MLS: 900 INJECTION, SOLUTION INTRAVENOUS at 03:34

## 2018-09-22 RX ADMIN — VANCOMYCIN HYDROCHLORIDE SCH MLS: 1 INJECTION, POWDER, LYOPHILIZED, FOR SOLUTION INTRAVENOUS at 03:34

## 2018-09-22 RX ADMIN — ACETAMINOPHEN PRN MG: 325 TABLET ORAL at 10:16

## 2018-09-22 RX ADMIN — VANCOMYCIN HYDROCHLORIDE SCH MLS: 1 INJECTION, POWDER, LYOPHILIZED, FOR SOLUTION INTRAVENOUS at 14:30

## 2018-09-22 NOTE — ASMTCMCOM
CM Note

 

CM Note                       

Notes:

Spoke w/pt and  re; dc poc. PT/OT are recommending SNF, pt reluctantly agrees but is a two 

person assist and incontinent of stool. Would like referrals sent to [facility].



DC Plan: SNF

 

Date Signed:  09/22/2018 04:35 PM

Electronically Signed By:Svitlana Garcia RN

## 2018-09-22 NOTE — CPEKG
Test Reason : OPEN

Blood Pressure : ***/*** mmHG

Vent. Rate : 090 BPM     Atrial Rate : 090 BPM

   P-R Int : 143 ms          QRS Dur : 094 ms

    QT Int : 346 ms       P-R-T Axes : 061 -52 055 degrees

   QTc Int : 424 ms

 

Sinus rhythm

Left anterior fascicular block

 

Confirmed by Skyler Wilkins ([id number]) on 9/22/2018 9:42:36 AM

 

Referred By:             Confirmed By:Skyler Wilkins

## 2018-09-22 NOTE — PDMN
Medical Necessity


Medical necessity: Change to inpt as of 9/21/18 @ 4521. Pt meets inpt criteria 

per MD order and MCG M-70, Cellulitis. 81 y/o w/LLE melanoma w/recent excision 

admitted w/LLE surgical site infection, acute metabolic encephalopathy, 

persistent fevers despite IV abx requiring broadening of ABX therapy, concern 

for systemic infection,wound cultures + for staph aureus, blood cultures pending

, anticipate >2MN for management of worsening cellulitis and close surgical 

reassessment.

## 2018-09-22 NOTE — SOAPPROG
SOAP Progress Note


Assessment/Plan: 


Assessment:


no real complaints but looks flushed


leg wound stable with decreased cellulitis





does not appear ready for home altho shes wants to go























Plan:continue abx





09/22/18 13:19





Objective: 





 Vital Signs











Temp Pulse Resp BP Pulse Ox


 


 37.4 C   91   18   166/83 H  90 L


 


 09/22/18 11:17  09/22/18 11:17  09/22/18 11:17  09/22/18 11:17  09/22/18 11:17








 Laboratory Results





 09/22/18 10:22 





 09/22/18 10:22 





 











 09/21/18 09/22/18 09/23/18





 05:59 05:59 05:59


 


Intake Total  2050 


 


Output Total  275 


 


Balance  1775 








 











PT  15.6 SEC (12.0-15.0)  H  09/21/18  01:01    


 


INR  1.22  (0.83-1.16)  H  09/21/18  01:01    














ICD10 Worksheet


Patient Problems: 


 Problems











Problem Status Onset


 


Weakness Acute  


 


Wound infection Acute  


 


Bronchospasm Acute  


 


Pneumonia Acute  


 


Severe sepsis Acute

## 2018-09-22 NOTE — HOSPPROG
Hospitalist Progress Note


Assessment/Plan: 





DIAGNOSES: 


* surgical wound infection with MSSA, surrounding cellulitis


* acute encephalopathy due to above, possibly also medications


* diabetes mellitus with adequate sugar control at this time


* Previously diagnosed as pre diabetes but has high enough sugars here that 

this is diabetes


* Follow-up with primary care and diet exercise and lifestyle measures are 

indicated; medication may not be indicated at this time but will check 

hemoglobin A1c


* history of sleep apnea COPD and pulmonary hypertension


* recent melanoma excision


* senile macular degeneration with decreased vision


* hypothyroidism on therapy





PLANS:


* Change antibiotics to cover for MSSA


* Continue wound care and leg elevation


* Will stop IV hydration when she begins to drink fluids okay


* Continue to follow sugars


* Check hemoglobin A1c








SUBJECTIVE:


Denies much in the way of any leg pain


Denies chills or sweats


Very weak





OBJECTIVE


Vitals reviewed:  Now afebrile with stable vitals





Exam:


alert mildly disoriented, but more normally interactive and alert today


skin warm dry color ok


resps not labored


lungs clear BSs


heart regular


abd soft nondistended nontender, bowel sounds present


limbs warm, no edema; her left leg has cellulitis that persist around her 

recent surgical incision wound though this appears better than yesterday.  

There is still no evidence of any abscess or devitalized tissue.  The wound is 

open centrally and the wound bed appears clean with minimal drainage at this 

time





iv site ok





Laboratory data:


White blood cell count down to 16,000 stable hemoglobin and platelets


Stable chemistries, adequate blood sugar control











Objective: 


 Vital Signs











Temp Pulse Resp BP Pulse Ox


 


 36.9 C   81   20   135/67 H  90 L


 


 09/22/18 16:00  09/22/18 16:00  09/22/18 16:00  09/22/18 16:00  09/22/18 16:00








 Laboratory Results





 09/22/18 10:22 





 09/22/18 10:22 





 











 09/21/18 09/22/18 09/23/18





 06:59 06:59 06:59


 


Intake Total  2050 2247


 


Output Total  275 


 


Balance  1775 2247








 











PT  15.6 SEC (12.0-15.0)  H  09/21/18  01:01    


 


INR  1.22  (0.83-1.16)  H  09/21/18  01:01    














ICD10 Worksheet


Patient Problems: 


 Problems











Problem Status Onset


 


Weakness Acute  


 


Wound infection Acute  


 


Bronchospasm Acute  


 


Pneumonia Acute  


 


Severe sepsis Acute

## 2018-09-23 LAB — PLATELET # BLD: 240 10^3/UL (ref 150–400)

## 2018-09-23 RX ADMIN — SODIUM CHLORIDE SCH MLS: 900 INJECTION, SOLUTION INTRAVENOUS at 01:23

## 2018-09-23 RX ADMIN — GABAPENTIN SCH MG: 300 CAPSULE ORAL at 21:01

## 2018-09-23 RX ADMIN — AMITRIPTYLINE HYDROCHLORIDE SCH MG: 10 TABLET, FILM COATED ORAL at 21:02

## 2018-09-23 RX ADMIN — PRAVASTATIN SODIUM SCH MG: 40 TABLET ORAL at 21:01

## 2018-09-23 RX ADMIN — Medication SCH MLS: at 13:03

## 2018-09-23 RX ADMIN — LEVOTHYROXINE SODIUM SCH MCG: 150 TABLET ORAL at 05:32

## 2018-09-23 RX ADMIN — Medication SCH MLS: at 21:02

## 2018-09-23 RX ADMIN — Medication SCH MLS: at 05:31

## 2018-09-23 RX ADMIN — ASPIRIN 81 MG SCH MG: 81 TABLET ORAL at 21:01

## 2018-09-23 RX ADMIN — VITAMIN D, TAB 1000IU (100/BT) SCH: 25 TAB at 08:26

## 2018-09-23 RX ADMIN — ACETAMINOPHEN PRN MG: 325 TABLET ORAL at 16:58

## 2018-09-23 RX ADMIN — THERA TABS SCH: TAB at 12:31

## 2018-09-23 NOTE — ASMTCMCOM
CM Note

 

CM Note                       

Notes:

CM discuss patient case with RN, patient to have wound vac placed tomorrow Tues 9.24. 



CM met with patient and  Zay. Shared patient has been accepted to [facility], patient 

agrees to this and we discussed discharge will likely be tomorrow or Tuesday after the wound vac 

placement. Patient and  did not have any further CM questions, CM to follow. 



Current Discharge Plan:  D/C likely tomorrow or Tuesday to [facility]. 







 

 

Date Signed:  09/23/2018 01:25 PM

Electronically Signed By:Amparo Cantu

## 2018-09-23 NOTE — SOAPPROG
SOAP Progress Note


Assessment/Plan: 


Assessment:


no real complaints but looks flushed


leg wound stable with decreased cellulitis





does not appear ready for home altho shes wants to go























Plan:continue abx





09/22/18 13:19





09/23/18 09:53


AFEBRILE/COMPLAIN OF SOME FEELINGS OF SHORTNESS OF BREATH BUT CHEST X-RAY IS 

CLEAR


LEFT LEG WOUND IS STILL ERYTHEMATOUS AND INDURATED AND UNDERMINING WITH POOR 

GRANULATION TISSUE DESPITE EXCELLENT PEDAL PULSES


PLAN IS SUTURE REMOVAL AND WOUND VAC PLACEMENT


Objective: 





 Vital Signs











Temp Pulse Resp BP Pulse Ox


 


 36.9 C   72   20   181/97 H  96 


 


 09/23/18 07:59  09/23/18 07:59  09/23/18 07:59  09/23/18 08:20  09/23/18 07:59








 Laboratory Results





 09/23/18 04:45 





 09/23/18 04:45 





 











 09/22/18 09/23/18 09/24/18





 05:59 05:59 05:59


 


Intake Total 2050 2247 


 


Output Total 275  


 


Balance 1775 2247 








 











PT  15.6 SEC (12.0-15.0)  H  09/21/18  01:01    


 


INR  1.22  (0.83-1.16)  H  09/21/18  01:01    














ICD10 Worksheet


Patient Problems: 


 Problems











Problem Status Onset


 


Weakness Acute  


 


Wound infection Acute  


 


Bronchospasm Acute  


 


Pneumonia Acute  


 


Severe sepsis Acute

## 2018-09-23 NOTE — GCON
DATE OF CONSULTATION:  09/21/2018



The patient is an 82-year-old female who was admitted with what was thought to be possible sepsis and
 infection, cellulitis in her left leg.  She has had a recent melanoma excised in that area.  She has
 chronic lymphedema and stasis changes in that leg and the wound was left partially open.  Appears to
 be red and inflamed and mildly tender, although she does not complain of much pain.  Her main compla
ints are feeling poorly today.



ALLERGIES:  Penicillin.



PRESENT MEDICATIONS:  Pravachol, aspirin, tramadol, amitriptyline, lisinopril, Cymbalta, vitamin D, N
eurontin, Synthroid.



PAST MEDICAL HISTORY:  Includes atrial fibrillation, coronary artery disease with stent placement, os
teoporosis with compression fractures, hyperlipidemia, recent melanoma, COPD and hypertension.  She h
as also had a total knee arthroplasty and a melanoma excision, coronary stent.



REVIEW OF SYSTEMS:  Reveals she has macular degeneration with markedly decreased vision, hypothyroidi
sm, nephrolithiasis, peripheral neuropathy, pulmonary hypertension, and obstructive sleep apnea.



FAMILY HISTORY:  Positive for coronary artery disease.



SOCIAL HISTORY:  Reveals she is an ex-smoker.



PHYSICAL EXAMINATION:  GENERAL:  Reveals an ill-appearing, overweight, 82-year-old female, who is in 
no acute distress.  She is afebrile.  HEAD AND NECK:  Reveals her vision to be poor.  Pupils are norm
al.  No oral lesions.  Her neck is supple without bruits.  CHEST:  Clear.  CARDIAC:  Reveals a regula
r rhythm.  ABDOMEN:  Soft, but protuberant.  EXTREMITIES:  Reveal full pulses.  She has an open 4.5 c
m wound on the left anterior shin with chronic stasis changes and edema in the leg, as well as a puru
lent base to the open wound and multiple sutures.  NEUROLOGIC:  Grossly physiologic.



IMPRESSION:  Cellulitis and a recent operative wound.



PLAN:  We may need to consider removal of other stitches and better open drainage and possible wound 
VAC placement.





Job #:  [id number]

## 2018-09-23 NOTE — ECHO
[email or web address]





[address] [address] 

Main: [phone] 



Fax: 



Transthoracic Echocardiogram 

Name:             THERESE ADAMS                       MR#:

[id number]

Study Date:       2018                            Study Time:

12:01 PM

YOB: 1935                            Age:

82 year(s)

Height:           165.1 cm (65 in.)                     Weight:

97.52 kg (215 lb.)

BSA:              2.04 m2                               Gender:

Female

Examination:      Echo                                  Indication:

Acute Pulm Edema, Post surgical infection.

Image Quality:                                          Contrast: 

Requested by:     David Martin                        BP:

159 mmHg/86 mmHg

Heart Rate:                                             Rhythm: 

Indication:       Acute Pulm Edema, Post surgical infection. 



Procedure Staff 

Ultrasound Technician:   Steven Amor RDCS 

Reading Physician:       Jose Alejandro Cox MD 

Requesting Provider: 



Conclusions:           Normal size left ventricle.  

No LV hypertrophy.  

Normal global systolic LV function.  

EF is 79 %.  

No regional wall motion abnormality.  

Grade 1 diastolic dysfunction (abnormal relaxation).  

Normal RV function.  

The left atrium is normal in size.  

The right atrium is normal in size.  

The mitral valve is normal in appearance and function.  

Aortic sclerosis is present.  

There is no aortic valve regurgitation.  

Trivial tricuspid valve regurgitation.  

The pulmonary artery pressure is mildly increased.  

Right ventricular systolic pressure measures 39mmHg.  

No pericardial effusion. 



Measurements: 

Chambers                    Valvular Assessment AV/MV

Valvular Assessment TV/PV



Normal                                   Normal

Normal

Name         Value     Range              Name         Value Range

Name           Value Range

Ao Marleni (MM): 3.8 cm    (2.2 cm-3.7            AV Vmax:     1.45 m/s (1

m/s-1.7        TR Vmax:       2.92 mm/s ( - )



cm)                                  m/s)              TR PGmax:

34 mmHg ( - )

IVSd (2D):   0.9 cm (0.6 cm-1.1               AV maxP mmHg ( -

)           syst. PAP: 39 mmHg ( - )



cm)                LVOT Vmax:   0.82 m/s (0.7 m/s-1.1      PV Vmax:

0.95 m/s (0.6 m/s-0.9

LVDd (2D):   4.7 cm    (3.9 cm-5.3                              m/s)

m/s)



cm)                MV E Vmax:   0.42 m/s ( - )         PV PGmax:

4  mmHg ( - )

LVDs (2D):   2.5 cm    (2.1 cm-4              MV A Vmax:   0.74 m/s (

- )



cm)                MV E/A:      0.57 ( - )  

LVPWd (2D):  0.9 cm    ( - )   



Patient: THERESE ADAMS                     MRN: [id number]

Study Date: 2018   Page 1 of 2

12:01 PM 









LVEF (2D): 79 (>=54 %)   



Continued Measurements: 

Chambers                      Valvular Assessment AV/MV

Valvular Assessment TV/PV



Name                      Value           Name

Value     Name                      Value

LADs Lon.4 cm               MV E' Septal:

0.04  m/s   CVP (est.):             5 mmHg

LA Area:                 14.3 cm2         MV E/E' Septal:

10.40



MV E/E' Lateral:         7.10    



Findings:              Left Ventricle: 

Normal size left ventricle. No LV hypertrophy. Normal global systolic

LV function. EF is 79 %. No

regional wall motion abnormality. Grade 1 diastolic dysfunction

(abnormal relaxation).

Right Ventricle: 

Normal size right ventricle. Normal RV function.  

Left Atrium: 

The left atrium is normal in size. Normal appearing atrial septum.  

Right Atrium: 

The right atrium is normal in size.  

Mitral Valve: 

The mitral valve is normal in appearance and function. Mild mitral

annular calcification.

Aortic Valve: 

The aortic valve is tri-leaflet. Aortic sclerosis is present. There is

no aortic valve regurgitation.

Tricuspid Valve: 

The tricuspid valve is normal in appearance and function. Trivial

tricuspid valve regurgitation. The

pulmonary artery pressure is mildly increased. Right ventricular

systolic pressure measures

39mmHg.  

Pulmonic Valve: 

The pulmonic valve is normal in appearance and function.  

Aorta: 

The aorta is normal.  

Pericardium: 

No pericardial effusion.  

Exam Comments: 

Off Axis views obtained to ca sanderson.. 







Electronically signed by Jose Alejandro Cox MD on 2018 at 01:27 PM 

(No Signature Object) 



Patient: THERESE ADAMS                     MRN: [id number]

Study Date: 2018   Page 2 of 2

12:01 PM 







[id number]

## 2018-09-23 NOTE — HOSPPROG
Hospitalist Progress Note


Assessment/Plan: 





DIAGNOSES: 


* acute pulmonary edema/volume overload after IV fluids


* no angina but hx of CAD w stent 2013 ef 65% then


* surgical wound infection with MSSA, surrounding cellulitis


* the wound edges are undermined, mild purulent drainage, no abscess


* acute encephalopathy due to above, possibly also medications


* resolved


* diabetes mellitus with adequate sugar control at this time


* Previously diagnosed as pre diabetes but has high enough sugars here that 

this is diabetes


* Follow-up with primary care and diet exercise and lifestyle measures are 

indicated; medication may not be indicated at this time but will check 

hemoglobin A1c


* history of sleep apnea COPD and pulmonary hypertension


* recent melanoma excision


* senile macular degeneration with decreased vision


* hypothyroidism on therapy





PLANS:


* lasix ordered, IVF stopped


* grewal to follow output and as she is immobile with wound on leg


* ekg and echocardiogram ordered


* Change antibiotics to cover for MSSA


* Continue wound care and leg elevation; Dr Brito has asked for sutures to be 

removed, and will arrange wound vac


* Continue to follow sugars


* Check hemoglobin A1c








SUBJECTIVE:


has had dyspnea and "panting" all night, no cough or chest pain, pulse ok


still some pain in leg





OBJECTIVE


Vitals reviewed:  Hypertensive this am, tachypnea mid 30s, no fever





Exam:


I examined w Dr Brito today


alert oriented 


skin warm dry color ok


resps labored w audible wheeze


lungs diffuse rales and mild wheeze


heart regular


abd soft nondistended nontender, bowel sounds present


limbs warm, no edema; her left leg has cellulitis that persist around her 

recent surgical incision wound though this appears better than yesterday.  

There is still no evidence of any abscess or devitalized tissue.  The wound is 

open centrally and the wound bed appears clean with minimal drainage at this 

time





iv site ok





Laboratory data:


White blood cell count down to 16,000 stable hemoglobin and platelets


Stable chemistries, adequate blood sugar control





Imaging:


CXR today, I reviewed images which show diffuse pulmonary edema





Micro:


wound culture with MSSA


blood cx's neg to date








Objective: 


 Vital Signs











Temp Pulse Resp BP Pulse Ox


 


 36.9 C   72   20   181/97 H  96 


 


 09/23/18 07:59  09/23/18 07:59  09/23/18 07:59  09/23/18 08:20  09/23/18 07:59








 Laboratory Results





 09/23/18 04:45 





 09/23/18 04:45 





 











 09/22/18 09/23/18 09/24/18





 06:59 06:59 06:59


 


Intake Total 2050 2247 


 


Output Total 275  


 


Balance 1775 2247 








 











PT  15.6 SEC (12.0-15.0)  H  09/21/18  01:01    


 


INR  1.22  (0.83-1.16)  H  09/21/18  01:01    














- Time Spent With Patient


Time Spent with Patient: greater than 35 minutes


Time Spent with Patient: Greater than 35 minutes spent on this patients care, 

greater than 50% of time spent counseling, educating, and coordinating care 

regarding the above mentioned plan.





ICD10 Worksheet


Patient Problems: 


 Problems











Problem Status Onset


 


Weakness Acute  


 


Wound infection Acute  


 


Bronchospasm Acute  


 


Pneumonia Acute  


 


Severe sepsis Acute

## 2018-09-24 RX ADMIN — Medication SCH MLS: at 05:06

## 2018-09-24 RX ADMIN — LEVOTHYROXINE SODIUM SCH MCG: 150 TABLET ORAL at 05:06

## 2018-09-24 RX ADMIN — ASPIRIN 81 MG SCH MG: 81 TABLET ORAL at 20:07

## 2018-09-24 RX ADMIN — PRAVASTATIN SODIUM SCH MG: 40 TABLET ORAL at 20:08

## 2018-09-24 RX ADMIN — GABAPENTIN SCH MG: 300 CAPSULE ORAL at 20:07

## 2018-09-24 RX ADMIN — VITAMIN D, TAB 1000IU (100/BT) SCH UNITS: 25 TAB at 09:35

## 2018-09-24 RX ADMIN — AMITRIPTYLINE HYDROCHLORIDE SCH MG: 10 TABLET, FILM COATED ORAL at 20:07

## 2018-09-24 RX ADMIN — THERA TABS SCH EACH: TAB at 12:32

## 2018-09-24 RX ADMIN — Medication SCH MLS: at 13:53

## 2018-09-24 RX ADMIN — Medication SCH MLS: at 23:23

## 2018-09-24 RX ADMIN — LISINOPRIL SCH MG: 5 TABLET ORAL at 18:05

## 2018-09-24 NOTE — SOAPPROG
SOAP Progress Note


Assessment/Plan: 


Assessment/Plan: 81yo F s/p L inguinal SLN bx and WLE of LLE for melanoma. 

Admitted with cellulitis LLE


Path with clear margins


IV antibiotics


Culture MSSA


Wound care - sutures removed over weekend due to significant undermining. Wound 

vac will be placed today.


Discussed STSG in future when granulation tissue and infection cleared.


No OR debridement at this time - regular diet ok


Appreciate hospitalist management of comorbidities





S: still very uncomfortable and does not want to be in the hospital. No pain of 

LLE, worsening swelling.


O: laying in bed, comfortable, NAD


No increased WOB. Supplemental O2


3+ PE LLE


Wound to level of fascia. Slough in base of wound with scattered pale 

granulation. Sutures removed. Surrounding erythema and warmth persistent but 

slightly improved from my exam Friday.


+DP and PT pulses LLE





09/24/18 13:03





Objective: 





 Vital Signs











Temp Pulse Resp BP Pulse Ox


 


 36.5 C   59 L  16   162/98 H  93 


 


 09/24/18 12:00  09/24/18 12:00  09/24/18 12:00  09/24/18 12:00  09/24/18 12:00








 Laboratory Results





 09/23/18 04:45 





 09/23/18 04:45 





 











 09/23/18 09/24/18 09/25/18





 05:59 05:59 05:59


 


Intake Total 2247  


 


Output Total  2450 


 


Balance 2247 -2450 








 











PT  15.6 SEC (12.0-15.0)  H  09/21/18  01:01    


 


INR  1.22  (0.83-1.16)  H  09/21/18  01:01    














ICD10 Worksheet


Patient Problems: 


 Problems











Problem Status Onset


 


Weakness Acute  


 


Wound infection Acute  


 


Bronchospasm Acute  


 


Pneumonia Acute  


 


Severe sepsis Acute

## 2018-09-24 NOTE — WOCRNPDOC
WOCRN Advanced Assessment Note





- Skin Integrity Problem, Advanced Assess


  ** Left Leg Surgical Wound/Incision


Dressing Type: ABD Pad, Hydrofera Blue Ready


Dressing Description: Intact, Shadowed


Exudate Amount: Minimal


Exudate Characteristic(s): Serous


Integumentary Issue Intervention: Dressing Changed


Nora Wound Tissue: Blanching, Erythema


Nora Wound Swelling: Mild


Wound Bed Color: Pink, Yellow


Wound Bed Constitution: Red/Pink - Non Granular Tissue (10%), Adhered Slough (90

%)


Wound Edges: Attached


Site Odor: None


Site Measurement - Head-to-Toe Length X Width X Depth (cm): 10.8x5.6x1.6


Skin Integrity Problem Comment: Old dressing removed and wound cleansed 

liberally with NS and gauze. No sting skin prep applied periwound followed by 

drape periwound. One piece of small black simplace foam placed in wound bed and 

draped. Track pad applied and -125mmHg NPWT achieved with no leaks. Heidi CHAVEZ 

in room to assist with cares. Wound care will round again on Wednesday.

## 2018-09-24 NOTE — HOSPPROG
Hospitalist Progress Note


Assessment/Plan: 





DIAGNOSES: 


* acute pulmonary edema/volume overload after IV fluids


* History of CAD but no current evidence of ischemia


* EF normal on echo at this time and no significant valve issues


* Improving with Lasix


* surgical wound infection with MSSA, surrounding cellulitis


* Wound VAC now in place will need this for unknown period, to be followed by 

skin graft


* acute encephalopathy due to above, possibly also medications


* resolved


* acute kidney injury due to dehydration and acute illness


* Resolved with hydration and treatment of infection


* uncontrolled hypertension


* Has been off her lisinopril but should be able to resume that at this time


* diabetes mellitus with adequate sugar control at this time


* Previously diagnosed as pre diabetes but has high enough sugars here that 

this is diabetes


* Follow-up with primary care and diet exercise and lifestyle measures are 

indicated; medication may not be indicated at this time but will check 

hemoglobin A1c


* history of sleep apnea COPD and pulmonary hypertension


* recent melanoma excision


* senile macular degeneration with decreased vision


* hypothyroidism on therapy





PLANS:


* 1 more dose of Lasix today, IVF stopped


* grewal to follow output and as she is immobile with wound on leg; plan on 

likely removing September 25th


* Ancef for MSSA


* Continue wound care and leg elevation; wound VAC for now


* Check hemoglobin A1c


* Will resume her lisinopril and consider other measures as necessary for 

hypertension, recheck renal function





I had a lengthy visit at the bedside today and reviewed all the above in detail 

with the patient has been answered many questions for them.





SUBJECTIVE:


Feel somewhat better today with less dyspnea, no leg pain or nausea





OBJECTIVE


Vitals reviewed:  Hypertensive this am (has been off lisinopril due to renal 

issues), tachypnea resolved, no fever





Exam:


alert oriented 


skin warm dry color ok


resps labored w audible wheeze


lungs diffuse rales and mild wheeze


heart regular


abd soft nondistended nontender, bowel sounds present


limbs warm, cellulitis and swelling in the leg are improved around the wound, 

wound VAC in place at this time without leak





iv site ok





Laboratory data:


White blood cell count down to 12,000





Imaging:


CXR today, I reviewed images which show diffuse pulmonary edema





Micro:


wound culture with MSSA


blood cx's neg to date








Objective: 


 Vital Signs











Temp Pulse Resp BP Pulse Ox


 


 36.9 C   62   16   168/79 H  91 L


 


 09/24/18 15:48  09/24/18 15:48  09/24/18 15:48  09/24/18 15:48  09/24/18 15:48








 Laboratory Results





 09/23/18 04:45 





 09/23/18 04:45 





 











 09/23/18 09/24/18 09/25/18





 06:59 06:59 06:59


 


Intake Total 2247  


 


Output Total  2450 


 


Balance 2247 -2450 








 











PT  15.6 SEC (12.0-15.0)  H  09/21/18  01:01    


 


INR  1.22  (0.83-1.16)  H  09/21/18  01:01    














- Time Spent With Patient


Time Spent with Patient: greater than 35 minutes


Time Spent with Patient: Greater than 35 minutes spent on this patients care, 

greater than 50% of time spent counseling, educating, and coordinating care 

regarding the above mentioned plan.





ICD10 Worksheet


Patient Problems: 


 Problems











Problem Status Onset


 


Weakness Acute  


 


Wound infection Acute  


 


Bronchospasm Acute  


 


Pneumonia Acute  


 


Severe sepsis Acute

## 2018-09-25 RX ADMIN — THERA TABS SCH EACH: TAB at 11:23

## 2018-09-25 RX ADMIN — ASPIRIN 81 MG SCH MG: 81 TABLET ORAL at 21:03

## 2018-09-25 RX ADMIN — Medication SCH MLS: at 05:35

## 2018-09-25 RX ADMIN — AMITRIPTYLINE HYDROCHLORIDE SCH MG: 10 TABLET, FILM COATED ORAL at 21:03

## 2018-09-25 RX ADMIN — Medication SCH MLS: at 22:29

## 2018-09-25 RX ADMIN — VITAMIN D, TAB 1000IU (100/BT) SCH UNITS: 25 TAB at 09:15

## 2018-09-25 RX ADMIN — PRAVASTATIN SODIUM SCH MG: 40 TABLET ORAL at 21:03

## 2018-09-25 RX ADMIN — ACETAMINOPHEN PRN MG: 325 TABLET ORAL at 11:23

## 2018-09-25 RX ADMIN — Medication SCH MLS: at 14:43

## 2018-09-25 RX ADMIN — LISINOPRIL SCH MG: 5 TABLET ORAL at 09:16

## 2018-09-25 RX ADMIN — LEVOTHYROXINE SODIUM SCH MCG: 150 TABLET ORAL at 09:14

## 2018-09-25 RX ADMIN — GABAPENTIN SCH MG: 300 CAPSULE ORAL at 21:02

## 2018-09-25 NOTE — ASMTCMCOM
TATE Note

 

TATE Note                       

Notes:

Pts case discussed w/ Madison Jones NP. Madison will be consulting ID for their direction for 

ivabx. TATE spoke to Fatmata from [facility] and she has the auth from [place]. Fatmata has ordered a wound 

vac for pt. TATE to follow.







Plan: [facility]

 

Date Signed:  09/25/2018 11:13 AM

Electronically Signed By:LETI Bryan

## 2018-09-25 NOTE — HOSPPROG
Hospitalist Progress Note


Assessment/Plan: 


83 yo F w/ hx of COPD, HTN, CAD, and LLE melanoma with recent excision presents 

with malaise and likely wound infection. Today is my first encounter with the 

patient, chart reviewed.





*surgical wound infection, MSSA, surrounding cellulitis


-recent melanoma excision


-Cefazolin


-wound vac


-eventually will need a skin graft


-blood cx show no growth





*acute pulmonary edema


-EF ok and no significant valve issues on the echo


-improved w Lasix


-now on room air





*acute encephalopathy


-resolved





*COPD - No evidence of acute exacerbation. 


-hx of sleep apnea and pulmonary htn





*GERARDO


-resolved





*uncontrolled htn





*DM


-A1C 7.1





* senile macular degeneration with decreased vision


-occurred 4 years ago








*Plan: remove grewal, will ask ID to get involved to help w length of time of 

antibiotic management. Will verify HIPPA form is filled out and call the patient

's daughter in the morning to update her.





Subjective: Shannan has no complaints, but is concerned about her left lower leg 

area.


Objective: 


 Vital Signs











Temp Pulse Resp BP Pulse Ox


 


 36.6 C   71   16   172/101 H  94 


 


 09/25/18 07:55  09/25/18 07:55  09/25/18 07:55  09/25/18 07:55  09/25/18 07:55








 Laboratory Results





 09/23/18 04:45 





 09/25/18 04:37 





 











 09/24/18 09/25/18 09/26/18





 05:59 05:59 05:59


 


Intake Total  150 


 


Output Total 2450 1200 


 


Balance -2450 -1050 








 











PT  15.6 SEC (12.0-15.0)  H  09/21/18  01:01    


 


INR  1.22  (0.83-1.16)  H  09/21/18  01:01    














- Physical Exam


Constitutional: chronically ill appearing, obese


Ears, Nose, Mouth, Throat: hearing normal


Cardiovascular: regular rate and rhythym


Genitourinary: grewal in urethra


Skin: warm, other (left lower ext w wound vac in place)


Neurologic: AAOx3


Psychiatric: interacting appropriately





ICD10 Worksheet


Patient Problems: 


 Problems











Problem Status Onset


 


Weakness Acute  


 


Wound infection Acute  


 


Bronchospasm Acute  


 


Pneumonia Acute  


 


Severe sepsis Acute

## 2018-09-25 NOTE — SOAPPROG
SOAP Progress Note


Assessment/Plan: 


Assessment/Plan: 81yo F s/p L inguinal SLN bx and WLE of LLE for melanoma. 

Admitted with cellulitis LLE


Path with clear margins


IV antibiotics


Culture MSSA


Wound care - wound vac MWF


Discussed STSG in future when granulation tissue and infection cleared.


Appreciate hospitalist management of comorbidities


Dispo: pt open to going to SNF after admission for IV antibiotics and wound vac





S: feels unwell. concerned about high blood pressure. No pain in LLE


O: laying in bed, comfortable, NAD


No increased WOB


3+ PE LLE


Wound vac intact to suction. Surrounding erythema stable compared to yesterday 


+DP and PT pulses LLE





Objective: 





 Vital Signs











Temp Pulse Resp BP Pulse Ox


 


 36.6 C   71   16   172/101 H  94 


 


 09/25/18 07:55  09/25/18 07:55  09/25/18 07:55  09/25/18 07:55  09/25/18 07:55








 Laboratory Results





 09/23/18 04:45 





 09/25/18 04:37 





 











 09/24/18 09/25/18 09/26/18





 05:59 05:59 05:59


 


Intake Total  150 


 


Output Total 2450 1200 


 


Balance -2450 -1050 








 











PT  15.6 SEC (12.0-15.0)  H  09/21/18  01:01    


 


INR  1.22  (0.83-1.16)  H  09/21/18  01:01    














ICD10 Worksheet


Patient Problems: 


 Problems











Problem Status Onset


 


Weakness Acute  


 


Wound infection Acute  


 


Bronchospasm Acute  


 


Pneumonia Acute  


 


Severe sepsis Acute

## 2018-09-25 NOTE — CPEKG
Test Reason : OPEN

Blood Pressure : ***/*** mmHG

Vent. Rate : 079 BPM     Atrial Rate : 079 BPM

   P-R Int : 151 ms          QRS Dur : 098 ms

    QT Int : 383 ms       P-R-T Axes : 080 -56 070 degrees

   QTc Int : 440 ms

 

Sinus rhythm

Left anterior fascicular block

 

Confirmed by Mahamed Ghotra ([id number]) on 9/25/2018 5:08:47 PM

 

Referred By:             Confirmed By:Mahamed Ghotra

## 2018-09-25 NOTE — GCON
DATE OF CONSULTATION:  09/25/2018



REFERRING PHYSICIAN:  Madison Jones NP



REASON FOR CONSULTATION:  Left lower extremity cellulitis.



HISTORY OF PRESENT ILLNESS:  The patient is an 82-year-old female with a past medical history of maira
noma resection of the left lower extremity on 09/13/2018, who I am asked to see in consultation for l
eft lower extremity cellulitis and wound infection.  The patient was readmitted on 09/21/2018, at [facility] point in time she had presented to the hospital with nonspecific complaints of feeling unwell.  Sh
e had not noted fever or chills.  She did not have any left lower extremity complaints.  However, ash
luation at time of presentation revealed a left lower extremity cellulitis and wound infection.  Cult
ures were obtained from purulence that was noted initially and have grown MSSA.  The patient has been
 maintained on cefazolin.  She notes that symptoms have been slow to improve.  She has subsequently h
ad her sutures removed and a wound VAC placed into the wound bed.  She does not have any inguinal aida
n or left thigh pain.  At time of my evaluation, she has not been elevating her lower extremities.  G
iven the above findings, Infectious Disease consultation is now requested to assist in her ongoing ma
tito.



PAST MEDICAL HISTORY:  Melanoma as above, COPD, hypertension, coronary artery disease, atrial fibrill
ation, hypertension, hyperlipidemia, osteoporosis.



PAST SURGICAL HISTORY:  Melanoma resection as above, bilateral knee arthroplasties.



CURRENT MEDICATIONS:  Cefazolin 2 g IV every 8 hours, Elavil 10 mg p.o. at bedtime, Norvasc 2.5 mg p.
o. daily, aspirin 81 mg p.o. at bedtime, Celebrex 200 mg p.o. twice daily, vitamin D 2000 units p.o. 
daily, Cymbalta 60 mg p.o. at bedtime, Neurontin 600 mg p.o. at bedtime, Synthroid 150 mcg p.o. daily
, lisinopril 5 mg p.o. daily, multivitamin p.o. daily, Pravachol 40 mg p.o. at bedtime.



ALLERGIES:  Penicillin associated with facial swelling and erythema.



SOCIAL HISTORY:  The patient is a former smoker.  No alcohol use.  The patient lives in [facility].



FAMILY HISTORY:  Mother with coronary artery disease.



REVIEW OF SYSTEMS:  Outside that noted in the HPI, the remainder of 10-system review is unremarkable 
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



PHYSICAL EXAMINATION:  VITAL SIGNS:  Temperature maximum during hospitalization on 09/21/2018, 38.6; 
no subsequent temperature elevations and current temperature is 36.7, heart rate 61, respiratory rate
 16, blood pressure 152/89, oxygen saturation 91% on room air.  GENERAL:  The patient is an obese fem
corinne in no acute distress.  She appears nontoxic.  HEENT:  Notable for blindness.  No scleral icterus,
 conjunctival injection, or conjunctival petechiae.  Oropharynx shows dentures on the upper bridge.  
Mucous membranes are slightly dry.  No tenderness over the sinuses.  NECK:  Supple without palpable l
ymphadenopathy or thyromegaly.  CHEST:  Clear to auscultation bilaterally without adventitious sounds
.  The respiratory effort is normal.  CARDIOVASCULAR:  Regular rate and rhythm with a 2/6 systolic mu
rmur heard throughout.  No gallops or rubs noted.  ABDOMEN:  Obese, nontender, nondistended.  No palp
able organomegaly.  Bowel sounds are present.  MUSCULOSKELETAL:  Left lower extremity shows 2 to 3+ e
amanda with erythema extending from the patient's wound VAC superiorly to just below the knee; there is
 associated warmth and tenderness; there is no palpable fluctuance or crepitus; no bulla present.  LY
MPHATICS:  No lymphangitis in left thigh; prior biopsy in left inguinal region of lymph node healing 
well without erythema or drainage.  SKIN:  See musculoskeletal.  No stigmata of endocarditis.  There 
is intertrigo present in the inguinal regions bilaterally.  NEUROLOGIC:  The patient is alert and int
eracts appropriately with the examiner.  Muscle tone and bulk are normal.  Sensation is grossly intac
t.



LABORATORY DATA:  White blood cell count 12.1, hematocrit 41.9, platelets 240, neutrophils 64%, lymph
ocytes 17%.  Serum creatinine 0.7, hemoglobin A1c 7.1, albumin 3.1.  Blood cultures x2 sets are no gr
owth.  Wound culture shows 4+ GPCs with 4+ white blood cells and has grown 4+ MSSA.



IMPRESSION:  

1.  Left lower extremity wound infection with concomitant cellulitis due to methicillin-sensitive Sta
phylococcal aureus:  Still has significant cellulitis on examination requiring ongoing IV antibiotic 
therapy.  Suspect this will be slow to resolve based on clinical findings and underlying obesity.  Le
g elevation will also be important in patient's therapy.

2.  Intertrigo.



RECOMMENDATIONS:  

1.  Agree with cefazolin 2 g IV every 8 hours.

2.  Elevate left lower extremity on 2 pillows.

3.  Will try to coordinate examination of wound with next wound VAC dressing change.

4.  Nystatin powder applied to inguinal regions twice daily.  



Thank you for this consultation.  We will continue to follow the patient with you.





Job #:  [id number]

## 2018-09-26 RX ADMIN — LEVOTHYROXINE SODIUM SCH MCG: 150 TABLET ORAL at 06:19

## 2018-09-26 RX ADMIN — HYDROCORTISONE SCH APP: 25 CREAM TOPICAL at 12:25

## 2018-09-26 RX ADMIN — Medication SCH MLS: at 13:00

## 2018-09-26 RX ADMIN — PRAVASTATIN SODIUM SCH MG: 40 TABLET ORAL at 20:09

## 2018-09-26 RX ADMIN — ASPIRIN 81 MG SCH MG: 81 TABLET ORAL at 20:09

## 2018-09-26 RX ADMIN — NYSTATIN SCH APP: 100000 POWDER TOPICAL at 09:59

## 2018-09-26 RX ADMIN — THERA TABS SCH EACH: TAB at 12:30

## 2018-09-26 RX ADMIN — AMITRIPTYLINE HYDROCHLORIDE SCH MG: 10 TABLET, FILM COATED ORAL at 20:09

## 2018-09-26 RX ADMIN — Medication SCH MLS: at 21:47

## 2018-09-26 RX ADMIN — GABAPENTIN SCH MG: 300 CAPSULE ORAL at 20:09

## 2018-09-26 RX ADMIN — HYDROCORTISONE SCH APP: 25 CREAM TOPICAL at 20:09

## 2018-09-26 RX ADMIN — LISINOPRIL SCH MG: 5 TABLET ORAL at 09:01

## 2018-09-26 RX ADMIN — VITAMIN D, TAB 1000IU (100/BT) SCH UNITS: 25 TAB at 09:01

## 2018-09-26 RX ADMIN — NYSTATIN SCH APP: 100000 POWDER TOPICAL at 20:09

## 2018-09-26 RX ADMIN — Medication SCH MLS: at 06:19

## 2018-09-26 NOTE — HOSPPROG
Hospitalist Progress Note


Assessment/Plan: 


81 yo F w/ hx of COPD, HTN, CAD, and LLE melanoma with recent excision presents 

with malaise and likely wound infection. 





*surgical wound infection, MSSA, surrounding cellulitis


-recent melanoma excision


-Cefazolin


-wound vac changed today


-eventually will need a skin graft


-blood cx show no growth





*acute pulmonary edema


-EF ok and no significant valve issues on the echo


-improved w Lasix


-now on room air





*acute encephalopathy


-resolved





*COPD - No evidence of acute exacerbation. 


-hx of sleep apnea and pulmonary htn





*GERARDO


-resolved





*uncontrolled htn





*DM


-A1C 7.1





* senile macular degeneration with decreased vision


-occurred 4 years ago








*Plan: discussed her care w Dr Valiente, she will either be on oral abx vs IV abx, 

her leg looks improved today with less redness and less swelling.  Called 

patient's daughter, Anay, to update her- message left.  [phone] 


Subjective: Shannan is feeling better today, no complaints.


Objective: 


 Vital Signs











Temp Pulse Resp BP Pulse Ox


 


 36.9 C   60   16   172/91 H  90 L


 


 09/26/18 08:00  09/26/18 03:48  09/26/18 08:00  09/26/18 09:01  09/26/18 03:48








 Laboratory Results





 09/23/18 04:45 





 09/25/18 04:37 





 











 09/25/18 09/26/18 09/27/18





 05:59 05:59 05:59


 


Intake Total 150  


 


Output Total 1200 500 


 


Balance -1050 -500 








 











PT  15.6 SEC (12.0-15.0)  H  09/21/18  01:01    


 


INR  1.22  (0.83-1.16)  H  09/21/18  01:01    














- Physical Exam


Constitutional: chronically ill appearing, obese


Ears, Nose, Mouth, Throat: hearing normal


Cardiovascular: regular rate and rhythym


Respiratory: no respiratory distress


Gastrointestinal: normoactive bowel sounds


Skin: warm, other (left lower ext wound with some grey tissue that surgery is 

going to debride at the bedside, has good granulation in the middle of the wound

)


Neurologic: AAOx3


Psychiatric: interacting appropriately





ICD10 Worksheet


Patient Problems: 


 Problems











Problem Status Onset


 


Weakness Acute  


 


Wound infection Acute  


 


Bronchospasm Acute  


 


Pneumonia Acute  


 


Severe sepsis Acute

## 2018-09-26 NOTE — ASMTCMCOM
CM Note

 

CM Note                       

Notes:

Spoke w/ NP, pt not ready for dc. CM notified Fatmata at [facility], they have wound vac when pt is 

ready but will need to get auth from ins again.



DC Plan: SNF/[facility]

 

Date Signed:  09/26/2018 02:13 PM

Electronically Signed By:Svitlana Garcia RN

## 2018-09-26 NOTE — PCMIDPN
Assessment/Plan: 


Assessment/Plan:


* Left lower extremity cellulitis/wound infection post melanoma excision with 

culture showing growth of MSSA:  Significant improvement today in cellulitic 

component.  Continue cefazolin and leg elevation.  Emphasized importance of leg 

elevation in terms of achieving resolution.  Given overall improvement, will 

reassess tomorrow as may be able to transition to oral antibiotics rather than 

having to complete course of IV antibiotic therapy based on clinical response.





09/26/18 14:20





Subjective: 


No complaints of leg pain. No itching, rash, or diarrhea.  Wound VAC changed 

earlier today and wound bed debrided at time of wound VAC change.  Photos 

obtained at time of wound VAC change were reviewed.





Sonali ROBERTSON, junior scribing for, and in the presence of, Jeffery Valiente MD. Jeffery ROBERTSON MD, personally performed the services described in this documentation

, as scribed by Sonali Reaves in my presence, and it is both accurate and 

complete. 


Objective: 


 Vital Signs











Temp Pulse Resp BP Pulse Ox


 


 36.8 C   59 L  16   165/80 H  90 L


 


 09/26/18 11:22  09/26/18 11:22  09/26/18 11:22  09/26/18 11:22  09/26/18 11:22








 Laboratory Results





 09/23/18 04:45 





 09/25/18 04:37 





 











 09/25/18 09/26/18 09/27/18





 05:59 05:59 05:59


 


Intake Total 150  


 


Output Total 1200 500 


 


Balance -1050 -500 








Cefazolin #4, Abx #5





09/21/2018 Blood Cx, 2 sets NGTD. 


09/21/2018 Wound Cx, grew MSSA.





- Physical Exam


General Appearance: alert, no apparent distress


EENT: other (moist mucous membranes ), No scleral icterus


Respiratory: lungs clear, No respiratory distress


Cardiac/Chest: regular rate, rhythm


Extremities: non-tender, other (LLE: 2+ edema; wound VAC in place, erythema 

adjacent to wound VAC and more superiorly, significantly decreased edema and 

extent of erythema, no warmth, nontender, no fluctuance.  Photos from wound 

care visit reviewed showing mixed granulation and fibrinous slough without 

purulence; LUE: L-wrist with irritation from wrist band. RUE: R-forearm IV line 

with no drainage or erythema. ), No calf tenderness


Peripheral Pulses: 2+: dorsalis-pedis (R), dorsalis-pedis (L)


Abdomen: non-tender, soft


Skin: warm/dry


Neuro/Psych: alert, normal mood/affect


Lymphatic: other (No left inguinal pain.)





ICD10 Worksheet


Patient Problems: 


 Problems











Problem Status Onset


 


Weakness Acute  


 


Pneumonia Acute  


 


Bronchospasm Acute  


 


Severe sepsis Acute  


 


Wound infection Acute

## 2018-09-26 NOTE — SOAPPROG
SOAP Progress Note


Assessment/Plan: 


Assessment/Plan: 83yo F s/p L inguinal SLN bx and WLE of LLE for melanoma. 

Admitted with cellulitis LLE


Path with clear margins


IV antibiotics, appreciate ID


Culture MSSA


Wound care - wound vac MWF. Start Veraflo today


STSG in future when granulation tissue and infection cleared.


Appreciate hospitalist management of comorbidities


Dispo: DC later this week, Friday? pt open to going to SNF after admission for 

IV antibiotics and wound vac





S: feels much better today. No pain of LLE. 


O: laying in bed, comfortable, NAD


No increased WOB


2+ PE LLE


Wound with mixture of healthy granulation, pale granulation and slough (<50%). 

Verbal consent obtained, time out performed. Sharp debridement of soft tissue 

to level of subcutaneous tissue, revealed >75% healthy granulation tissue. 

Hemostasis achieved with direct pressure. Pt tolerated well. Surrounding 

erythema slightly improved compared to yesterday 


+DP and PT pulses LLE





Objective: 





 Vital Signs











Temp Pulse Resp BP Pulse Ox


 


 36.8 C   59 L  16   165/80 H  90 L


 


 09/26/18 11:22  09/26/18 11:22  09/26/18 11:22  09/26/18 11:22  09/26/18 11:22








 Laboratory Results





 09/23/18 04:45 





 09/25/18 04:37 





 











 09/25/18 09/26/18 09/27/18





 05:59 05:59 05:59


 


Intake Total 150  


 


Output Total 1200 500 


 


Balance -1050 -500 








 











PT  15.6 SEC (12.0-15.0)  H  09/21/18  01:01    


 


INR  1.22  (0.83-1.16)  H  09/21/18  01:01    














ICD10 Worksheet


Patient Problems: 


 Problems











Problem Status Onset


 


Weakness Acute  


 


Wound infection Acute  


 


Bronchospasm Acute  


 


Pneumonia Acute  


 


Severe sepsis Acute

## 2018-09-26 NOTE — WOCRNPDOC
GENIE Advanced Assessment Note





- Skin Integrity Problem, Advanced Assess


  ** Left Leg Surgical Wound/Incision


Dressing Type: Open to Air


Exudate Amount: Minimal


Exudate Characteristic(s): Sanguinous


Integumentary Issue Intervention: Dressing Applied


Nora Wound Tissue: Erythema (moderate to severe extending both superiorly and 

inferiorly over entire anterior lower leg)


Nora Wound Swelling: Moderate


Wound Bed Color: Red, Yellow


Wound Bed Constitution: Granulation Tissue (30%), Undermining (12-2 oclock 2 cm)

, Adhered Slough (70%)


Wound Edges: Not Attached, Well Defined


Site Measurement - Head-to-Toe Length X Width X Depth (cm): 10x5.4x1.6


Skin Integrity Problem Comment: Vac had been removed prior to Wound RN 

rounding. Discussed plan with Dolores ESPOSITO and due to significant amount of 

slough in wound bed and erythema nora wound decision was made to initiate 

veraflo with Vashe instillation. Dolores Chang performed bedside debridement 

with a currette then 2 pieces of perforated grey foam were placed in wound bed, 

covered by one piece of thin non perforated grey foam. Vac was set to -125 mm 

Hg suction without leaks and an instillation of 32 ml of Vashe for a dwell time 

of 7 min every 3 hours was initiated. Vac was working well without any leaks. 

Heidi RN in room for end of vac dressing placement.





  ** Left Lateral Wrist Pressure Injury


Dressing Type: Open to Air


Nora Wound Tissue: Erythema (3 cm nora wound ), Non-blanching


Nora Wound Swelling: Mild


Wound Bed Color: Pink


Wound Bed Constitution: Red/Pink - Non Granular Tissue


Site Measurement - Head-to-Toe Length X Width X Depth (cm): 1x1x0.1


Pressure Injury Stage: Stage 2, Medical Device Related Pressure Injury (Patient 

ID band)


Skin Integrity Problem Comment: Patient reports that her ID band was too tight 

and caused this wound. Band was removed by RN Heidi to be replaced by a larger 

one. Wound care will not follow this wound. Dolores ESPOSITO wrote orders for 

hydrocortisone to area.

## 2018-09-27 RX ADMIN — LISINOPRIL SCH MG: 5 TABLET ORAL at 08:38

## 2018-09-27 RX ADMIN — NYSTATIN SCH: 100000 POWDER TOPICAL at 22:17

## 2018-09-27 RX ADMIN — Medication SCH MLS: at 05:13

## 2018-09-27 RX ADMIN — POLYETHYLENE GLYCOL 3350 SCH GM: 17 POWDER, FOR SOLUTION ORAL at 08:39

## 2018-09-27 RX ADMIN — NYSTATIN SCH APP: 100000 POWDER TOPICAL at 08:39

## 2018-09-27 RX ADMIN — ACETAMINOPHEN PRN MG: 325 TABLET ORAL at 19:14

## 2018-09-27 RX ADMIN — THERA TABS SCH EACH: TAB at 08:38

## 2018-09-27 RX ADMIN — AMITRIPTYLINE HYDROCHLORIDE SCH MG: 10 TABLET, FILM COATED ORAL at 21:30

## 2018-09-27 RX ADMIN — VITAMIN D, TAB 1000IU (100/BT) SCH UNITS: 25 TAB at 08:38

## 2018-09-27 RX ADMIN — GABAPENTIN SCH MG: 300 CAPSULE ORAL at 21:30

## 2018-09-27 RX ADMIN — PRAVASTATIN SODIUM SCH MG: 40 TABLET ORAL at 21:30

## 2018-09-27 RX ADMIN — HYDROCORTISONE SCH: 25 CREAM TOPICAL at 22:17

## 2018-09-27 RX ADMIN — DOCUSATE SODIUM AND SENNOSIDES SCH TAB: 50; 8.6 TABLET ORAL at 08:37

## 2018-09-27 RX ADMIN — POLYETHYLENE GLYCOL 3350 SCH GM: 17 POWDER, FOR SOLUTION ORAL at 21:30

## 2018-09-27 RX ADMIN — HYDROCORTISONE SCH APP: 25 CREAM TOPICAL at 08:39

## 2018-09-27 RX ADMIN — ACETAMINOPHEN PRN MG: 325 TABLET ORAL at 12:27

## 2018-09-27 RX ADMIN — LEVOTHYROXINE SODIUM SCH MCG: 150 TABLET ORAL at 05:13

## 2018-09-27 RX ADMIN — DOCUSATE SODIUM AND SENNOSIDES SCH TAB: 50; 8.6 TABLET ORAL at 21:30

## 2018-09-27 RX ADMIN — Medication SCH MLS: at 12:30

## 2018-09-27 RX ADMIN — Medication SCH MLS: at 21:29

## 2018-09-27 RX ADMIN — ASPIRIN 81 MG SCH MG: 81 TABLET ORAL at 21:30

## 2018-09-27 NOTE — PCMIDPN
Assessment/Plan: 


Assessment/Plan:


* Left lower extremity cellulitis/wound infection post melanoma excision with 

culture showing growth of MSSA:  Continued resolution of cellulitis.  Will 

continue cefazolin while in hospital with plans to transition to cephalexin 500 

mg orally 3 times per day at time of discharge with plans for 7 days of 

therapy.  Continue leg elevation and local wound care.





09/27/18 09:44





Subjective: 





Patient complains of left lateral neck pain.  No left lower extremity pain.


Objective: 


 Vital Signs











Temp Pulse Resp BP Pulse Ox


 


 36.7 C   65   18   187/94 H  91 L


 


 09/27/18 08:00  09/27/18 08:00  09/27/18 08:00  09/27/18 08:38  09/27/18 08:00








 Laboratory Results





 09/23/18 04:45 





 09/25/18 04:37 





 











 09/26/18 09/27/18 09/28/18





 05:59 05:59 05:59


 


Intake Total  617.3 


 


Output Total 500  


 


Balance -500 617.3 








Cefazolin # 5, antibiotics # 6


Blood cultures x2 no growth





- Physical Exam


General Appearance: alert, no apparent distress


EENT: No scleral icterus


Respiratory: wheezing (Bilaterally)


Cardiac/Chest: regular rate, rhythm


Extremities: inflammation (Left lower extremity erythema significantly 

decreased with small residual superior to wound VAC; wound VAC in place; 

nontender with significant decrease in edema throughout; mild residual warmth)





ICD10 Worksheet


Patient Problems: 


 Problems











Problem Status Onset


 


Weakness Acute  


 


Wound infection Acute  


 


Bronchospasm Acute  


 


Pneumonia Acute  


 


Severe sepsis Acute

## 2018-09-27 NOTE — HOSPPROG
Hospitalist Progress Note


Assessment/Plan: 


83 yo F w/ hx of COPD, HTN, CAD, and LLE melanoma with recent excision presents 

with malaise and likely wound infection. 





*surgical wound infection, MSSA, surrounding cellulitis


-recent melanoma excision


-Cefazolin


-wound vac changed yesterday


-eventually will need a skin graft


-blood cx show no growth


-reviewed her care w Dr Valiente and she can be dc on Cephalexin 500 mg TID


-cellulitis looks much improved





*acute pulmonary edema


-EF ok and no significant valve issues on the echo


-improved w Lasix


-now on room air





*acute encephalopathy


-resolved





*COPD - No evidence of acute exacerbation. 


-hx of sleep apnea and pulmonary htn





*GERARDO


-resolved





*uncontrolled htn


-increased her home dose of lisinopril from 5mg to 10 mg


-added Norvasc w some improvement





*DM


-A1C 7.1





* senile macular degeneration with decreased vision


-occurred 4 years ago








*Plan: discussed her care w Dr Valiente, she can dc on oral abx .  Called patient's 

daughter, Anay, to update her- message left.  [phone]. She has a wound 

vac in place that will be changed tomorrow and hopefully can dc to rehab. 


Subjective: Shannan has a headache today, is concerned her bp is high


Objective: 


 Vital Signs











Temp Pulse Resp BP Pulse Ox


 


 36.6 C   68   18   158/95 H  89 L


 


 09/27/18 11:40  09/27/18 11:40  09/27/18 11:40  09/27/18 11:40  09/27/18 11:40








 Laboratory Results





 09/23/18 04:45 





 09/25/18 04:37 





 











 09/26/18 09/27/18 09/28/18





 05:59 05:59 05:59


 


Intake Total  617.3 


 


Output Total 500  


 


Balance -500 617.3 








 











PT  15.6 SEC (12.0-15.0)  H  09/21/18  01:01    


 


INR  1.22  (0.83-1.16)  H  09/21/18  01:01    














- Physical Exam


Constitutional: chronically ill appearing, obese, uncomfortable


Ears, Nose, Mouth, Throat: hearing normal


Cardiovascular: regular rate and rhythym


Respiratory: no respiratory distress


Skin: warm, other (lle with much less redness and swelling; wound vac in place)


Neurologic: AAOx3


Psychiatric: interacting appropriately





ICD10 Worksheet


Patient Problems: 


 Problems











Problem Status Onset


 


Weakness Acute  


 


Wound infection Acute  


 


Bronchospasm Acute  


 


Pneumonia Acute  


 


Severe sepsis Acute

## 2018-09-28 VITALS — SYSTOLIC BLOOD PRESSURE: 171 MMHG | DIASTOLIC BLOOD PRESSURE: 86 MMHG

## 2018-09-28 RX ADMIN — NYSTATIN SCH APP: 100000 POWDER TOPICAL at 09:23

## 2018-09-28 RX ADMIN — POLYETHYLENE GLYCOL 3350 SCH GM: 17 POWDER, FOR SOLUTION ORAL at 09:22

## 2018-09-28 RX ADMIN — HYDROCORTISONE SCH APP: 25 CREAM TOPICAL at 09:22

## 2018-09-28 RX ADMIN — DOCUSATE SODIUM AND SENNOSIDES SCH TAB: 50; 8.6 TABLET ORAL at 09:22

## 2018-09-28 RX ADMIN — VITAMIN D, TAB 1000IU (100/BT) SCH UNITS: 25 TAB at 09:21

## 2018-09-28 RX ADMIN — Medication SCH MLS: at 05:40

## 2018-09-28 RX ADMIN — LEVOTHYROXINE SODIUM SCH MCG: 150 TABLET ORAL at 05:40

## 2018-09-28 NOTE — WOCRNPDOC
WOCRN Advanced Assessment Note





- Skin Integrity Problem, Advanced Assess


  ** Left Leg Surgical Wound/Incision


Dressing Type: Black Vac Foam (x2), Wound Vac


Dressing Description: Clean/Dry, Intact


Closure Description: Not Approximated


Exudate Amount: Scant


Exudate Color: Reddish/Yellow


Exudate Characteristic(s): Serosanguinous


Integumentary Issue Intervention: Dressing Changed


Nora Wound Tissue: Erythema, Swollen


Nora Wound Swelling: Mild


Wound Bed Color: Pink, Red, Yellow


Wound Bed Constitution: Granulation Tissue (15%), Red/Pink - Non Granular 

Tissue (35%), Adhered Slough (50%)


Wound Edges: Not Attached, Well Defined


Site Measurement - Head-to-Toe Length X Width X Depth (cm): 10.2x6x1.2.  

undermining at 2 o'clock to 0.8cm, from 4-5 o'clock to 0.8cm, and from 9-11 o'

clock to 1cm


Skin Integrity Problem Comment: Wound bed cleaned thoroughly with NS and gauze. 

Nora wound tissue prepped with skin prep and wound bed filled fully with 1 

piece black simplace foam. Another piece black foam used for trac pad landing. 

Good seal achieved at    -125mmHg suction. Patient tolerated the procedure 

well. All questions answered. Patient is expected to DC momentarily. Wound care 

will follow up Monday if, for some reason, patient does not DC.

## 2018-09-28 NOTE — ASMTLACE
LACE

 

Length of stay for            Answers:  7-13 days                             

current admission                                                             

Comorbidities - select        Answers:  Chronic pulmonary disease             

all that apply                                                                

                                        Coronary Artery Disease               

                                        Other                         Notes:  HLD; HTN; Hypothyroid

# of Emergency department     Answers:  1-2                                   

visits in the last 6                                                          

months                                                                        

Score: 11

 

Date Signed:  09/28/2018 04:49 PM

Electronically Signed By:Nichole Burks

## 2018-09-28 NOTE — ASMTDCNOTE
Case Management Discharge

 

Discharge Order Complete?     Answers:  Yes                                   

Patient to Obtain             Answers:  Other                         Notes:  [facility] provided

Medications                                                                   

Transportation Arranged       Answers:  Other                         Notes:  [facility]

Transport will Pick (Date     09/28/2018 02:00 PM

& Time)                       

Agency/Facility Transfer      Answers:  Yes                                   

Report Printed & Faxed to                                                     

Receiving Agency                                                              

Family Notified               Answers:  Yes                                   

Discharge Comments            

Notes:

Pt to discharge to [facility] at 14:00 after wound vac dressing change. No further CM needs noted 

at this time. 

D/C Plan: [facility]

 

Date Signed:  09/28/2018 02:17 PM

Electronically Signed By:Nichole Burks

## 2018-09-28 NOTE — ASDISCHSUM
----------------------------------------------

Discharge Information

----------------------------------------------

Plan Status:SNF                                      Medically Cleared to Leave:09/28/2018

Discharge Date:09/28/2018 02:15 PM                    D/C Disposition:Skilled Nursing Facility

ADT D/C Disposition:Skilled Nursing Facility         Projected Discharge Date:09/24/2018 11:00 AM

Transportation at D/C:Wheelchair Van                 Discharge Delay Reason:

Follow-Up Date:09/24/2018 11:00 AM                   Discharge Slot:

Final Diagnosis:Cellulitis

----------------------------------------------

Placement Information

----------------------------------------------

Referral Type:*Nursing Home/SNF                      Referral ID:[id number]

Provider Name:[facility]

Address 1:[address]                             Phone [phone]

Address 2:                                           Fax [phone]

City:[place]                                         Selection Factors:

State:[place]

 

----------------------------------------------

Patient Contact Information

----------------------------------------------

Contact Name:VITOR                           Relationship:

Address:[address] [place] [address]                             Home [phone]

                                                     Work [phone]

City:[place]                                         Alternate Phone:

State/Zip Code:[place] [address]                              Email:

----------------------------------------------

Financial Information

----------------------------------------------

Financial Class:Medicare Advantage Plans

Primary Plan Desc:[employer]ANS         Primary Plan Number:[id number]

Secondary Plan Desc:                                 Secondary Plan Number:

 

 

----------------------------------------------

Assessment Information

----------------------------------------------

----------------------------------------------

[facility] CM Progress Note

----------------------------------------------

CM Note

 

CM Note                       

Notes:

Spoke w/pt's daughter, pt lives at [place] with her  and she receives home care through 

[facility]. Pt's daughter thinks she needs SNF, PT/OT ordered. 



DC Plan: TBD

 

Date Signed:  09/21/2018 05:33 PM

Electronically Signed By:Svitlana Garcia RN

 

 

----------------------------------------------

[facility] CM Progress Note

----------------------------------------------

CM Note

 

CM Note                       

Notes:

Spoke w/pt and  re; dc poc. PT/OT are recommending SNF, pt reluctantly agrees but is a two 

person assist and incontinent of stool. Would like referrals sent to [facility].



DC Plan: SNF

 

Date Signed:  09/22/2018 04:35 PM

Electronically Signed By:Svitlana Garcia RN

 

 

----------------------------------------------

[facility] CM Progress Note

----------------------------------------------

CM Note

 

CM Note                       

Notes:

CM discuss patient case with RN, patient to have wound vac placed tomorrow Tues 9.24. 



CM met with patient and  Zay. Shared patient has been accepted to [facility], patient 

agrees to this and we discussed discharge will likely be tomorrow or Tuesday after the wound vac 

placement. Patient and  did not have any further CM questions, CM to follow. 



Current Discharge Plan:  D/C likely tomorrow or Tuesday to [facility]. 







 

 

Date Signed:  09/23/2018 01:25 PM

Electronically Signed By:Amparo Cantu

 

 

----------------------------------------------

[facility] Progress Note

----------------------------------------------

CM Note

 

CM Note                       

Notes:

Pts case discussed w/ Madison Jones NP. Madison will be consulting ID for their direction for 

ivabx. CM spoke to Fatmata from [facility] and she has the auth from [facility] Fatmata has ordered a wound 

vac for pt. CM to follow.







Plan: [facility]

 

Date Signed:  09/25/2018 11:13 AM

Electronically Signed By:LETI Bryan

 

 

----------------------------------------------

[facility] CM Progress Note

----------------------------------------------

CM Note

 

CM Note                       

Notes:

Spoke w/ NP, pt not ready for dc. CM notified Fatmata at [facility], they have wound vac when pt is 

ready but will need to get auth from ins again.



DC Plan: [facility]

 

Date Signed:  09/26/2018 02:13 PM

Electronically Signed By:Svitlana Garcia RN

 

 

----------------------------------------------

Case Management Discharge Plan Note

----------------------------------------------

Case Management Discharge

 

Discharge Order Complete?     Answers:  Yes                                   

Patient to Obtain             Answers:  Other                         Notes:  [facility] provided

Medications                                                                   

Transportation Arranged       Answers:  Other                         Notes:  [facility]

Transport will Pick (Date     09/28/2018 02:00 PM

& Time)                       

Agency/Facility Transfer      Answers:  Yes                                   

Report Printed & Faxed to                                                     

Receiving Agency                                                              

Family Notified               Answers:  Yes                                   

Discharge Comments            

Notes:

Pt to discharge to [facility] at 14:00 after wound vac dressing change. No further CM needs noted 

at this time. 

D/C Plan: [facility]

 

Date Signed:  09/28/2018 02:17 PM

Electronically Signed By:Nichole Burks

 

 

----------------------------------------------

Intervention Information

----------------------------------------------

## 2018-09-28 NOTE — SOAPPROG
SOAP Progress Note


Assessment/Plan: 


Assessment/Plan: 83yo F s/p L inguinal SLN bx and WLE of LLE for melanoma. 

Admitted with cellulitis LLE


Path with clear margins


IV antibiotics, appreciate ID. Transition to PO on DC


Culture MSSA. Blood cx neg


Wound care - wound vac MWF


STSG in future when granulation tissue and infection cleared


Appreciate hospitalist management of comorbidities


[facility])





S: feeling well. No pain of LLE. 


O: laying in bed, comfortable, NAD


No increased WOB


2+ PE LLE


Wound 9.5x6.1cm. 75% healthy granulation tissue.  Surrounding erythema 

significantly improved


+DP and PT pulses LLE





Objective: 





 Vital Signs











Temp Pulse Resp BP Pulse Ox


 


 36.6 C   56 L  16   171/86 H  89 L


 


 09/28/18 07:53  09/28/18 07:53  09/28/18 07:53  09/28/18 07:53  09/28/18 07:53








 Laboratory Results





 09/23/18 04:45 





 09/25/18 04:37 





 











 09/27/18 09/28/18 09/29/18





 05:59 05:59 05:59


 


Intake Total 617.3 650 


 


Output Total  500 150


 


Balance 617.3 150 -150








 











PT  15.6 SEC (12.0-15.0)  H  09/21/18  01:01    


 


INR  1.22  (0.83-1.16)  H  09/21/18  01:01    














ICD10 Worksheet


Patient Problems: 


 Problems











Problem Status Onset


 


Weakness Acute  


 


Wound infection Acute  


 


Bronchospasm Acute  


 


Pneumonia Acute  


 


Severe sepsis Acute

## 2018-09-28 NOTE — PDIAF
- Diagnosis


Diagnosis: cellulitis


Code Status: Full Code





- Medication Management


Discharge Medications: 


 Medications to Continue on Transfer





Amitriptyline HCl [Elavil 10 mg (*)] 10 mg PO HS 08/07/15 [Last Taken 09/12/18]


Cholecalciferol Vit D3 [Vitamin D3 (*)] 2,000 units PO DAILY 08/07/15 [Last 

Taken 09/12/18]


DULoxetine [Cymbalta 60 MG (*)] 60 mg PO HS 08/07/15 [Last Taken 09/12/18]


Lisinopril [Zestril 5 mg (*)] 5 mg PO DAILY@08 08/07/15 [Last Taken 09/13/18 07:

00]


Aspirin [Aspirin 81mg (*)] 81 mg PO HS 07/26/16 [Last Taken 09/12/18]


Multivitamins [Multivitamin (*)] 1 each PO DAILY@12 07/26/16 [Last Taken 09/12/ 18]


Pravastatin Sodium [Pravachol] 40 mg PO HS 07/26/16 [Last Taken 09/12/18]


Gabapentin [Neurontin 300 MG (*)] 600 mg PO HS 09/13/18 [Last Taken 09/12/18]


Levothyroxine [Synthroid 150 mcg (*)] 150 mcg PO DAILY06 09/13/18 [Last Taken 

Unknown]


Acetaminophen [Tylenol 325mg (*)] 650 mg PO Q4HRS PRN  tab 09/28/18 [Last Taken 

Unknown]


Cephalexin [Keflex (*)] 500 mg PO TID #21 cap 09/28/18 [Last Taken Unknown]


Hydrocortisone 2.5% [Hydrocortisone 2.5% cream (*)] 1 angela TP BID  cream 09/28/ 18 [Last Taken Unknown]


Nystatin Powder [Mycostatin Powder] 1 angela TP BID  powder 09/28/18 [Last Taken 

Unknown]


Polyethylene Glycol 3350 [Miralax 17 gm (*)] 17 gm PO BID  pkt 09/28/18 [Last 

Taken Unknown]


Sennosides/Docusate Sodium [Senokot-S] 1 - 2 tab PO BID  tab 09/28/18 [Last 

Taken Unknown]


amLODIPine BESYLATE [Norvasc 2.5 mg (*)] 2.5 mg PO DAILY  tab 09/28/18 [Last 

Taken Unknown]


celeCOXIB [Celebrex (*)] 200 mg PO BID  cap 09/28/18 [Last Taken Unknown]








Discharge Medications: Refer to the Discharge Home Medication list for PRN 

reason.





- Orders


Services needed: Registered Nurse, Physical Therapy, Occupational Therapy


Diet Recommendation: no restrictions on diet


Additional Instructions: 


Wound care orders:





Wound vac set at -125mmHg continuous with one piece of small black foam placed 

in wound bed. To be changed MWF.





- Follow Up Care


Current Providers and Referrals: 


Braden Hebert MD [Primary Care Provider] - As per Instructions


Shannan Velazquez MD [Medical Doctor] - follow up in 2 weeks

## 2018-09-28 NOTE — GDS
DISCHARGE DIAGNOSES:  

1.  Surgical wound infection.

2.  Acute pulmonary edema.

3.  Acute encephalopathy.

4.  Chronic obstructive pulmonary disease.

5.  Acute kidney injury.

6.  Uncontrolled hypertension.

7.  Diabetes mellitus.

8.  Senile macular degeneration.



CONSULTATIONS:  

1.  Infectious Disease.

2.  General surgery.



STUDIES AND PROCEDURES:  

1.  Echocardiogram.

2.  Wound care.



PHYSICAL EXAM:  GENERAL:  The patient is alert.  VITAL SIGNS:  Afebrile at 36.6, pulse is 56, respira
tory rate 16, blood pressure is 171/86.  She is saturating 89% on room air I have seen and evaluated 
the patient on the day of discharge.



HOSPITAL COURSE:  The patient is an 82-year-old female who presented to the hospital with complaints 
of malaise and weakness.  She was evaluated and diagnosed with:

1.  Surgical wound infection.  During this hospitalization this was noted to have MSSA.  She did rece
marina a consultation from Infectious Disease as well as General surgery.  This was likely secondary to 
a recent melanoma excision that was performed.  She has had a wound VAC during this hospitalization w
ith continued wound care.  She will eventually need a skin graft in this location and she will follow
 in the outpatient setting with surgery as well as Infectious Disease.  Antibiotic therapy at the jennifer
e of disposition will be cephalexin 500 mg three times daily.

2.  Acute pulmonary edema.  This has improved with Lasix treatment.  The patient's echocardiogram margarita
ws reasonable ejection fraction with no significant valve issues.  She will possibly need Lasix in th
e future if her mobility does not increase.

3.  Acute encephalopathy.  This has resolved and was likely secondary to infectious process.

4.  COPD.  This is stable.  Continue DuoNeb treatment p.r.n.

5.  Acute kidney injury.  This has resolved.

6.  Uncontrolled hypertension.  Her home dose of lisinopril has been increased from 5 mg to 10 mg as 
well as the addition of Norvasc.  I anticipate that the patient may require increased antihypertensiv
e medications in the outpatient setting.

7.  Diabetes mellitus.  Hemoglobin A1c is 7.1.  We will continue previous treatment.

8.  Senile macular degeneration.  This is at patient's baseline.



DISPOSITION:  The patient will be discharged to [facility] for further therapy and treatment.  I revi
ewed the patient's care and disposition with wound care as well as Infectious Disease.  They are both
 in agreement with this plan.  Case Management will arrange for transportation.  Followup will be wit
h Dr. Shannan Velazquez as well as her primary care physician, Dr. Ganga Hebert and Infectious Disease as 
needed.



TIME SPENT WITH PATIENT:  I spent greater than 35 minutes in the care, coordination, and management o
f this patient's disposition.





Job #:  [id number]

## 2018-10-03 NOTE — GOP
DATE OF OPERATION:  09/13/2018



SURGEON:  Shannan Velazquez MD



ANESTHESIA:  Rahel Chauhan MD/general.



PREOPERATIVE DIAGNOSIS:  Malignant melanoma, left lower extremity.



POSTOPERATIVE DIAGNOSIS:  Malignant melanoma, left lower extremity.



PROCEDURE PERFORMED:  Wide local excision, left lower extremity and sentinel lymph node.



FINDINGS:  Low uptake in groin at sentinel lymph node.  The wound on the left lower extremity measure
s 12 x 6 x 2 cm, and I was able to close the entire wound with the exception of 4.5 x 2.5 x 0.5 cm.



SPECIMENS:  Ellipse of tissue, left lower extremity, short superior, long lateral and sentinel lymph 
nodes.



ESTIMATED BLOOD LOSS:  Minimum.



INDICATIONS:  Shannan Schulz is an 82-year-old who had a melanoma on her left lower extremity.  She 
had positive margins.



DESCRIPTION OF PROCEDURE:  Patient was brought into the operating room, placed supine on the table an
d anesthesia was administered.  Her leg, including the groin, was prepped and draped in the usual timoteo
rile fashion.  I infiltrated the areas with 0.5% Marcaine prior to making incisions.  I measured 2 cm
 around the previous biopsy site in every direction and then elongated this to form an ellipse on her
 left lower extremity.  I performed dissection down to the layer of the fascia.  Hemostasis was achie
delta.  I then created flaps of tissue to assist with closure proximally and distally.  I closed the de
ep layers with 3-0 Vicryl.  I closed the skin with 2-0 nylon and 3-0 nylon.  There was a small area t
hat was unable to be closed due to tension, and Brenda, Hydrofera Blue and a dressing were placed.  I
n the groin, I used a probe to identify the sentinel lymph node.  There was very low uptake, but medi
al, more toward the pubis, there was slightly higher intake.  I made an incision over her groin, diss
ected down through skin and subcutaneous tissues until I encountered the node.  I used the Neoprobe t
o identify the nodes and excise them.  Hemostasis was achieved.  This was closed with 3-0 Vicryl foll
owed by 4-0 Monocryl and Dermabond.  She was awakened in the operating room, transferred to PACU in s
table condition.





Job #:  [id number]

## 2018-10-17 ENCOUNTER — HOSPITAL ENCOUNTER (INPATIENT)
Dept: HOSPITAL 80 - FED | Age: 83
LOS: 3 days | Discharge: NURSING, REHAB OR LONG-TERM CARE | DRG: 603 | End: 2018-10-20
Attending: INTERNAL MEDICINE | Admitting: INTERNAL MEDICINE
Payer: COMMERCIAL

## 2018-10-17 DIAGNOSIS — I25.10: ICD-10-CM

## 2018-10-17 DIAGNOSIS — I10: ICD-10-CM

## 2018-10-17 DIAGNOSIS — E78.5: ICD-10-CM

## 2018-10-17 DIAGNOSIS — E03.9: ICD-10-CM

## 2018-10-17 DIAGNOSIS — N17.9: ICD-10-CM

## 2018-10-17 DIAGNOSIS — J44.9: ICD-10-CM

## 2018-10-17 DIAGNOSIS — I48.91: ICD-10-CM

## 2018-10-17 DIAGNOSIS — Z96.653: ICD-10-CM

## 2018-10-17 DIAGNOSIS — Z87.891: ICD-10-CM

## 2018-10-17 DIAGNOSIS — L03.116: Primary | ICD-10-CM

## 2018-10-17 DIAGNOSIS — B95.0: ICD-10-CM

## 2018-10-17 DIAGNOSIS — E66.01: ICD-10-CM

## 2018-10-17 DIAGNOSIS — Z95.5: ICD-10-CM

## 2018-10-17 DIAGNOSIS — R09.02: ICD-10-CM

## 2018-10-17 LAB
INR PPP: 1.33 (ref 0.83–1.16)
PLATELET # BLD: 239 10^3/UL (ref 150–400)
PROTHROMBIN TIME: 16.7 SEC (ref 12–15)

## 2018-10-17 RX ADMIN — SODIUM CHLORIDE SCH MLS: 900 INJECTION, SOLUTION INTRAVENOUS at 23:15

## 2018-10-17 RX ADMIN — ASPIRIN 81 MG SCH MG: 81 TABLET ORAL at 21:34

## 2018-10-17 RX ADMIN — Medication SCH MLS: at 21:34

## 2018-10-17 RX ADMIN — PRAVASTATIN SODIUM SCH MG: 40 TABLET ORAL at 21:34

## 2018-10-17 RX ADMIN — GABAPENTIN SCH MG: 300 CAPSULE ORAL at 21:34

## 2018-10-17 NOTE — SOAPPROG
SOAP Progress Note


Assessment/Plan: 


Assessment:


82 FEMALE ADMITTED WITH LETHARGY AND POSSIBLEINFECTION AT LEFT LEG MELANOMA 

EXCISION SITE





FULL DISTAL PULSES/  15 CM OPEN WOUND WITH POOR GRANULATION AND MILD ERYTHEMA


AFEBRILE BUT WBC 21K























Plan:BID WET/DRY DRESSINGS/  ABX/  LEAVE VAC OFF UNTIL BETTER GRANULATION





10/17/18 21:43





Objective: 





 Vital Signs











Temp Pulse Resp BP Pulse Ox


 


 37.5 C   99   18   141/87 H  96 


 


 10/17/18 19:32  10/17/18 19:32  10/17/18 19:32  10/17/18 19:32  10/17/18 19:32








 











PT  16.7 SEC (12.0-15.0)  H  10/17/18  16:30    


 


INR  1.33  (0.83-1.16)  H  10/17/18  16:30    














ICD10 Worksheet


Patient Problems: 


 Problems











Problem Status Onset


 


Elevated WBC count Acute  


 


Weakness Acute  


 


Bronchospasm Acute  


 


Pneumonia Acute  


 


Severe sepsis Acute  


 


Wound infection Acute

## 2018-10-17 NOTE — EDPHY
H & P


Time Seen by Provider: 10/17/18 16:00


HPI/ROS: 





Chief complaint.  Fever, hypoxia





HPI.  Patient is an 82-year-old female with multiple medical problems.  She was 

evaluated at the wound clinic today and was found to be hypoxic and had low-

grade fever.  She was sent to the emergency department and arrives by EMS she 

has had fever to 99.8 degrees and has been quite tired and sleepy the last 2 

days.  She has been very thirsty and has been drinking a lot a water.  She has 

no chest pain.  She has no sense of shortness of breath and denies cough.  No 

abdominal pain though did vomit yesterday.  Generally incontinent of urine 

which has not changed.  She had a melanoma removed in September 2018 on her 

left lower extremity and then has been subsequently admitted for infection and 

bacteremia.





ROS


10 systems were reviewed and negative with the exception of the elements 

mentioned in the history of present illness





Past Medical/Surgical History: 





Past medical history significant for COPD, hypertension, coronary artery 

disease with stent, left lower extremity melanoma, atrial fibrillation, 

dyslipidemia, osteoporosis, hypothyroid, macular degeneration with 10% site 

residual, pulmonary hypertension, peripheral neuropathy


Social History: 





, nonsmoker, no alcohol


Smoking Status: Former smoker


Physical Exam: 





General Appearance:  Alert well-developed female moderate distress vital signs 

significant for room air O2 saturation 89%.  Afebrile and stable blood pressure.


Eyes: Pupils equal and round no pallor or injection.


ENT, Mouth:  Mucous membranes are moist.


Respiratory:  No retractions but inspiratory expiratory rhonchi.  Patient is 

unable to sit to let me listen to her lungs in the back.


Cardiovascular: Regular rate and rhythm.


Gastrointestinal:   Abdomen is soft and nontender, no masses, bowel sounds 

normal.


Neurological:  Awake and alert, sensory and motor exams grossly normal.


Skin: Warm and dry, no rashes.


Musculoskeletal:  Neck is supple nontender.


Extremities healing large surgical excision of melanoma on the left lower 

extremity that is granulating in and does not appear to be infected


Psychiatric: Patient is oriented X 3, there is no agitation.


Constitutional: 


 Initial Vital Signs











Temperature (C)  36.5 C   10/17/18 16:19


 


Heart Rate  79   10/17/18 16:19


 


Respiratory Rate  18   10/17/18 16:19


 


Blood Pressure  106/65   10/17/18 16:19


 


O2 Sat (%)  97   10/17/18 16:19








 











O2 Delivery Mode               Room Air














Allergies/Adverse Reactions: 


 





Penicillins Allergy (Unknown, Verified 09/13/18 09:02)


 








Home Medications: 














 Medication  Instructions  Recorded


 


Amitriptyline HCl [Elavil 10 mg 10 mg PO HS 08/07/15





(*)]  


 


Cholecalciferol Vit D3 [Vitamin D3 2,000 units PO DAILY 08/07/15





(*)]  


 


DULoxetine [Cymbalta 60 MG (*)] 60 mg PO HS 08/07/15


 


Lisinopril [Zestril 5 mg (*)] 5 mg PO DAILY@08 08/07/15


 


Aspirin [Aspirin 81mg (*)] 81 mg PO HS 07/26/16


 


Multivitamins [Multivitamin (*)] 1 each PO DAILY@12 07/26/16


 


Pravastatin Sodium [Pravachol] 40 mg PO HS 07/26/16


 


Gabapentin [Neurontin 300 MG (*)] 600 mg PO HS 09/13/18


 


Levothyroxine [Synthroid 150 mcg 150 mcg PO DAILY06 09/13/18





(*)]  


 


Acetaminophen [Tylenol 325mg (*)] 650 mg PO Q4HRS PRN  tab 09/28/18


 


Cephalexin [Keflex (*)] 500 mg PO TID #21 cap 09/28/18


 


Hydrocortisone 2.5% 1 angela TP BID  cream 09/28/18





[Hydrocortisone 2.5% cream (*)]  


 


Nystatin Powder [Mycostatin Powder] 1 angela TP BID  powder 09/28/18


 


Polyethylene Glycol 3350 [Miralax 17 gm PO BID  pkt 09/28/18





17 gm (*)]  


 


Sennosides/Docusate Sodium 1 - 2 tab PO BID  tab 09/28/18





[Senokot-S]  


 


amLODIPine BESYLATE [Norvasc 2.5 2.5 mg PO DAILY  tab 09/28/18





mg (*)]  


 


celeCOXIB [Celebrex (*)] 200 mg PO BID  cap 09/28/18














Medical Decision Making





- Diagnostics


EKG Interpretation: 





EKG interpreted by me shows normal sinus rhythm normal interval.  There is left 

axis deviation and left anterior fascicular block.  No significant ST elevation 

or depression.  No arrhythmia.  The rate is 79


Imaging Results: 


 Imaging Impressions





Chest X-Ray  10/17/18 16:05


Impression:


1. Mild congestive heart failure.


2. No definite pneumonia.


2. Consider chest 2 views when the patient's medical condition permits.








PICC line present.  No obvious pneumonia.  Mild CHF


Procedures: 





IV normal saline.  Septic workup


ED Course/Re-evaluation: 





Re-evaluation at 4:45 p.m..  Patient is stable.  She and I discussed imaging 

lab EKG results.  We discussed treatment plan including recommendation for 

admission.  She expresses understanding and agreement





I consulted discussed the case with Dr. Peralta, hospitalist who agrees to 

the admission.  She agrees to no antibiotics and no chest CT with IV contrast 

currently as her creatinine is elevated.


Differential Diagnosis: 





Patient has no evidence of sepsis with a normal lactate.  She has significantly 

elevated white blood cell count.  There is no obvious evidence for pneumonia or 

urinary tract infection.  There is evidence of mild CHF.  Possibly her elevated 

white blood cell count is due to her large wound on the left lower extremity 

where the melanoma was excised the there is no significant evidence for 

cellulitis currently





- Data Points


Laboratory Results: 


 Laboratory Results





 10/17/18 15:45 





 10/17/18 15:45 





 











  10/17/18 10/17/18 10/17/18





  16:36 16:30 16:15


 


WBC      





    


 


RBC      





    


 


Hgb      





    


 


Hct      





    


 


MCV      





    


 


MCH      





    


 


MCHC      





    


 


RDW      





    


 


Plt Count      





    


 


MPV      





    


 


Neut % (Auto)      





    


 


Lymph % (Auto)      





    


 


Mono % (Auto)      





    


 


Eos % (Auto)      





    


 


Baso % (Auto)      





    


 


Nucleat RBC Rel Count      





    


 


Absolute Neuts (auto)      





    


 


Absolute Lymphs (auto)      





    


 


Absolute Monos (auto)      





    


 


Absolute Eos (auto)      





    


 


Absolute Basos (auto)      





    


 


Absolute Nucleated RBC      





    


 


Immature Gran %      





    


 


Immature Gran #      





    


 


PT    16.7 SEC H SEC  





    (12.0-15.0)  


 


INR    1.33  H   





    (0.83-1.16)  


 


APTT    27.9 SEC SEC  





    (23.0-38.0)  


 


VBG Lactic Acid      





    


 


Sodium      





    


 


Potassium      





    


 


Chloride      





    


 


Carbon Dioxide      





    


 


Anion Gap      





    


 


BUN      





    


 


Creatinine      





    


 


Estimated GFR      





    


 


Glucose      





    


 


Calcium      





    


 


Total Bilirubin      





    


 


POC Troponin I  0.05 ng/mL ng/mL    





   (0.00-0.08)   


 


NT-Pro-B Natriuret Pep      





    


 


Urine Color      BRIELLE 





    


 


Urine Appearance      HAZY 





    


 


Urine pH      5.0 





     (5.0-7.5) 


 


Ur Specific Gravity      1.021 





     (1.002-1.030) 


 


Urine Protein      NEGATIVE 





     (NEGATIVE) 


 


Urine Ketones      NEGATIVE 





     (NEGATIVE) 


 


Urine Blood      2+  H 





     (NEGATIVE) 


 


Urine Nitrate      NEGATIVE 





     (NEGATIVE) 


 


Urine Bilirubin      NEGATIVE 





     (NEGATIVE) 


 


Urine Urobilinogen      NEGATIVE EU EU





     (0.2-1.0) 


 


Ur Leukocyte Esterase      NEGATIVE 





     (NEGATIVE) 


 


Urine RBC      5-10 /hpf H /hpf





     (0-3) 


 


Urine WBC      1-3 /hpf /hpf





     (0-3) 


 


Ur Epithelial Cells      TRACE /lpf /lpf





     (NONE-1+) 


 


Amorphous Sediment      PRESENT /hpf /hpf





     (NONE-1+) 


 


Hyaline Casts      1-5 /lpf /lpf





     (0-1) 


 


Urine Glucose      NEGATIVE 





     (NEGATIVE) 














  10/17/18 10/17/18 10/17/18





  16:00 15:45 15:45


 


WBC      21.86 10^3/uL H 10^3/uL





     (3.80-9.50) 


 


RBC      5.10 10^6/uL 10^6/uL





     (4.18-5.33) 


 


Hgb      15.7 g/dL g/dL





     (12.6-16.3) 


 


Hct      47.4 % H %





     (38.0-47.0) 


 


MCV      92.9 fL fL





     (81.5-99.8) 


 


MCH      30.8 pg pg





     (27.9-34.1) 


 


MCHC      33.1 g/dL g/dL





     (32.4-36.7) 


 


RDW      14.1 % %





     (11.5-15.2) 


 


Plt Count      239 10^3/uL 10^3/uL





     (150-400) 


 


MPV      9.2 fL fL





     (8.7-11.7) 


 


Neut % (Auto)      81.9 % H %





     (39.3-74.2) 


 


Lymph % (Auto)      10.0 % L %





     (15.0-45.0) 


 


Mono % (Auto)      6.2 % %





     (4.5-13.0) 


 


Eos % (Auto)      0.3 % L %





     (0.6-7.6) 


 


Baso % (Auto)      0.7 % %





     (0.3-1.7) 


 


Nucleat RBC Rel Count      0.0 % %





     (0.0-0.2) 


 


Absolute Neuts (auto)      17.91 10^3/uL H 10^3/uL





     (1.70-6.50) 


 


Absolute Lymphs (auto)      2.19 10^3/uL 10^3/uL





     (1.00-3.00) 


 


Absolute Monos (auto)      1.35 10^3/uL H 10^3/uL





     (0.30-0.80) 


 


Absolute Eos (auto)      0.06 10^3/uL 10^3/uL





     (0.03-0.40) 


 


Absolute Basos (auto)      0.15 10^3/uL H 10^3/uL





     (0.02-0.10) 


 


Absolute Nucleated RBC      0.00 10^3/uL 10^3/uL





     (0-0.01) 


 


Immature Gran %      0.9 % %





     (0.0-1.1) 


 


Immature Gran #      0.20 10^3/uL H 10^3/uL





     (0.00-0.10) 


 


PT      





    


 


INR      





    


 


APTT      





    


 


VBG Lactic Acid  1.3 mmol/L mmol/L    





   (0.7-2.1)   


 


Sodium      





    


 


Potassium      





    


 


Chloride      





    


 


Carbon Dioxide      





    


 


Anion Gap      





    


 


BUN      





    


 


Creatinine      





    


 


Estimated GFR      





    


 


Glucose      





    


 


Calcium      





    


 


Total Bilirubin      





    


 


POC Troponin I      





    


 


NT-Pro-B Natriuret Pep    838 pg/mL H pg/mL  





    (0-450)  


 


Urine Color      





    


 


Urine Appearance      





    


 


Urine pH      





    


 


Ur Specific Gravity      





    


 


Urine Protein      





    


 


Urine Ketones      





    


 


Urine Blood      





    


 


Urine Nitrate      





    


 


Urine Bilirubin      





    


 


Urine Urobilinogen      





    


 


Ur Leukocyte Esterase      





    


 


Urine RBC      





    


 


Urine WBC      





    


 


Ur Epithelial Cells      





    


 


Amorphous Sediment      





    


 


Hyaline Casts      





    


 


Urine Glucose      





    














  10/17/18





  15:45


 


WBC  





  


 


RBC  





  


 


Hgb  





  


 


Hct  





  


 


MCV  





  


 


MCH  





  


 


MCHC  





  


 


RDW  





  


 


Plt Count  





  


 


MPV  





  


 


Neut % (Auto)  





  


 


Lymph % (Auto)  





  


 


Mono % (Auto)  





  


 


Eos % (Auto)  





  


 


Baso % (Auto)  





  


 


Nucleat RBC Rel Count  





  


 


Absolute Neuts (auto)  





  


 


Absolute Lymphs (auto)  





  


 


Absolute Monos (auto)  





  


 


Absolute Eos (auto)  





  


 


Absolute Basos (auto)  





  


 


Absolute Nucleated RBC  





  


 


Immature Gran %  





  


 


Immature Gran #  





  


 


PT  





  


 


INR  





  


 


APTT  





  


 


VBG Lactic Acid  





  


 


Sodium  133 mEq/L L mEq/L





   (135-145) 


 


Potassium  4.9 mEq/L mEq/L





   (3.3-5.0) 


 


Chloride  96 mEq/L L mEq/L





   () 


 


Carbon Dioxide  24 mEq/l mEq/l





   (22-31) 


 


Anion Gap  13 mEq/L mEq/L





   (6-14) 


 


BUN  37 mg/dL H mg/dL





   (7-23) 


 


Creatinine  1.4 mg/dL H mg/dL





   (0.6-1.0) 


 


Estimated GFR  36 





  


 


Glucose  118 mg/dL H mg/dL





   () 


 


Calcium  8.7 mg/dL mg/dL





   (8.5-10.4) 


 


Total Bilirubin  0.5 mg/dL mg/dL





   (0.1-1.4) 


 


POC Troponin I  





  


 


NT-Pro-B Natriuret Pep  





  


 


Urine Color  





  


 


Urine Appearance  





  


 


Urine pH  





  


 


Ur Specific Gravity  





  


 


Urine Protein  





  


 


Urine Ketones  





  


 


Urine Blood  





  


 


Urine Nitrate  





  


 


Urine Bilirubin  





  


 


Urine Urobilinogen  





  


 


Ur Leukocyte Esterase  





  


 


Urine RBC  





  


 


Urine WBC  





  


 


Ur Epithelial Cells  





  


 


Amorphous Sediment  





  


 


Hyaline Casts  





  


 


Urine Glucose  





  











Point of Care Test Results: 


 Chemistry











  10/17/18





  16:36


 


POC Troponin I  0.05 ng/mL ng/mL





   (0.00-0.08) 














Departure





- Departure


Disposition: [facility]


Clinical Impression: 


 Weakness





Elevated WBC count


Qualifiers:


 Leukocytosis type: other Qualified Code(s): D72.828 - Other elevated white 

blood cell count





Condition: Fair

## 2018-10-17 NOTE — PDGENHP
History and Physical





- Chief Complaint


fever, lower extremity warmth





- History of Present Illness


81 yo F with hx of melanoma with relatively recent wide excision complicated by 

post operative wound infection presenting from home with complaints of 

increased fever and generalized malaise. Patient is very somnolent at the time 

of my evaluation but her  notes that he has appreciated increased warmth 

and redness of the area surrounding her surgical wound. She has felt generally 

more fatigued and tired. She notes that yesterday she had nausea and vomiting 

that she thinks is due to eating Mexican food. She notes that she previously 

had a wound vac and was on keflex but states that changes were made to her abx 

due to her diabetes. She is followed by Dr. Velazquez who upon hearing the issues 

as above recommended she come to the hospital for further evaluation. She 

denies any other associated sxs currently, including sob, chest pain, urinary 

issues. She has not had nausea or vomiting since yesterday but also hasn't 

eaten all day. 





History Information





- Allergies/Home Medication List


Allergies/Adverse Reactions: 








Penicillins Allergy (Unknown, Verified 09/13/18 09:02)


 





Home Medications: 








Amitriptyline HCl [Elavil 10 mg (*)] 10 mg PO HS 08/07/15 [Last Taken 10/16/18]


Cholecalciferol Vit D3 [Vitamin D3 (*)] 2,000 units PO DAILY 08/07/15 [Last 

Taken 10/17/18]


DULoxetine [Cymbalta 60 MG (*)] 60 mg PO HS 08/07/15 [Last Taken 10/16/18]


Lisinopril [Zestril 5 mg (*)] 5 mg PO DAILY@08 08/07/15 [Last Taken 10/17/18]


Aspirin [Aspirin 81mg (*)] 81 mg PO HS 07/26/16 [Last Taken 10/16/18]


Multivitamins [Multivitamin (*)] 1 each PO DAILY@12 07/26/16 [Last Taken 10/17/

18]


Pravastatin Sodium [Pravachol] 40 mg PO HS 07/26/16 [Last Taken 10/16/18]


Gabapentin [Neurontin 300 MG (*)] 600 mg PO HS 09/13/18 [Last Taken 10/16/18]


Levothyroxine [Synthroid 150 mcg (*)] 150 mcg PO DAILY06 09/13/18 [Last Taken 10

/17/18]


Polyethylene Glycol 3350 [Miralax 17 gm (*)] 17 gm PO BID PRN 10/17/18 [Last 

Taken Unknown]





I have personally reviewed and updated: family history, medical history, social 

history, surgical history





- Past Medical History


atrial fibrillation, coronary artery disease (s/p stent to LAD), cancer (

melanoma), COPD, hypertension, hyperlipidemia, osteoporosis (with L1 

compression fx)


Additional medical history: moderate to severe pulm htn.  shaina/ohs.  morbid 

obesity with BMI of 34.  peripheral neuropathy.  macular degeneration/

retinopathy with only 10% of vision.  hypothyroid.  gait instability.  

nephrolithiasis





- Surgical History


Reports: coronary stent


Additional surgical history: TKA.  melanoma excision





- Family History


Positive for: CAD (mother w/cabg at age 55)





- Social History


Smoking Status: Former smoker


Alcohol Use: Rarely


Drug Use: None


Additional social history: , lives in assisted living





Review of Systems


Review of Systems: 





ROS: 10pt was reviewed & negative except for what was stated in HPI & below





Physical Exam


Physical Exam: 

















Temp Pulse Resp BP Pulse Ox


 


 36.8 C   80   30 H  126/81 H  94 


 


 10/17/18 18:31  10/17/18 18:31  10/17/18 18:31  10/17/18 18:31  10/17/18 18:31




















O2 (L/minute)                  2














Constitutional: chronically ill appearing, obese


Eyes: PERRL


Ears, Nose, Mouth, Throat: moist mucous membranes, hearing normal


Cardiovascular: regular rate and rhythym, no murmur, rub, or gallop, edema


Respiratory: no respiratory distress, no rales or rhonchi


Gastrointestinal: normoactive bowel sounds, soft, non-tender abdomen, no 

palpable masses


Genitourinary: no bladder tenderness


Skin: erythema, other (large surgical wound, LLE wound extensive with 

surrounding erythema, some purulence)


Musculoskeletal: asymmetric calves


Neurologic: AAOx3


Psychiatric: interacting appropriately, not anxious, not encephalopathic





Lab Data & Imaging Review





 10/17/18 15:45





 10/17/18 15:45














WBC  21.86 10^3/uL (3.80-9.50)  H  10/17/18  15:45    


 


RBC  5.10 10^6/uL (4.18-5.33)   10/17/18  15:45    


 


Hgb  15.7 g/dL (12.6-16.3)   10/17/18  15:45    


 


Hct  47.4 % (38.0-47.0)  H  10/17/18  15:45    


 


MCV  92.9 fL (81.5-99.8)   10/17/18  15:45    


 


MCH  30.8 pg (27.9-34.1)   10/17/18  15:45    


 


MCHC  33.1 g/dL (32.4-36.7)   10/17/18  15:45    


 


RDW  14.1 % (11.5-15.2)   10/17/18  15:45    


 


Plt Count  239 10^3/uL (150-400)   10/17/18  15:45    


 


MPV  9.2 fL (8.7-11.7)   10/17/18  15:45    


 


Neut % (Auto)  81.9 % (39.3-74.2)  H  10/17/18  15:45    


 


Lymph % (Auto)  10.0 % (15.0-45.0)  L  10/17/18  15:45    


 


Mono % (Auto)  6.2 % (4.5-13.0)   10/17/18  15:45    


 


Eos % (Auto)  0.3 % (0.6-7.6)  L  10/17/18  15:45    


 


Baso % (Auto)  0.7 % (0.3-1.7)   10/17/18  15:45    


 


Nucleat RBC Rel Count  0.0 % (0.0-0.2)   10/17/18  15:45    


 


Absolute Neuts (auto)  17.91 10^3/uL (1.70-6.50)  H  10/17/18  15:45    


 


Absolute Lymphs (auto)  2.19 10^3/uL (1.00-3.00)   10/17/18  15:45    


 


Absolute Monos (auto)  1.35 10^3/uL (0.30-0.80)  H  10/17/18  15:45    


 


Absolute Eos (auto)  0.06 10^3/uL (0.03-0.40)   10/17/18  15:45    


 


Absolute Basos (auto)  0.15 10^3/uL (0.02-0.10)  H  10/17/18  15:45    


 


Absolute Nucleated RBC  0.00 10^3/uL (0-0.01)   10/17/18  15:45    


 


Immature Gran %  0.9 % (0.0-1.1)   10/17/18  15:45    


 


Immature Gran #  0.20 10^3/uL (0.00-0.10)  H  10/17/18  15:45    


 


PT  16.7 SEC (12.0-15.0)  H  10/17/18  16:30    


 


INR  1.33  (0.83-1.16)  H  10/17/18  16:30    


 


APTT  27.9 SEC (23.0-38.0)   10/17/18  16:30    


 


VBG Lactic Acid  1.3 mmol/L (0.7-2.1)   10/17/18  16:00    


 


Sodium  133 mEq/L (135-145)  L  10/17/18  15:45    


 


Potassium  4.9 mEq/L (3.3-5.0)   10/17/18  15:45    


 


Chloride  96 mEq/L ()  L  10/17/18  15:45    


 


Carbon Dioxide  24 mEq/l (22-31)   10/17/18  15:45    


 


Anion Gap  13 mEq/L (6-14)   10/17/18  15:45    


 


BUN  37 mg/dL (7-23)  H  10/17/18  15:45    


 


Creatinine  1.4 mg/dL (0.6-1.0)  H  10/17/18  15:45    


 


Estimated GFR  36   10/17/18  15:45    


 


Glucose  118 mg/dL ()  H  10/17/18  15:45    


 


Calcium  8.7 mg/dL (8.5-10.4)   10/17/18  15:45    


 


Total Bilirubin  0.5 mg/dL (0.1-1.4)   10/17/18  15:45    


 


POC Troponin I  0.05 ng/mL (0.00-0.08)   10/17/18  16:36    


 


NT-Pro-B Natriuret Pep  838 pg/mL (0-450)  H  10/17/18  15:45    


 


Urine Color  BRIELLE   10/17/18  16:15    


 


Urine Appearance  HAZY   10/17/18  16:15    


 


Urine pH  5.0  (5.0-7.5)   10/17/18  16:15    


 


Ur Specific Gravity  1.021  (1.002-1.030)   10/17/18  16:15    


 


Urine Protein  NEGATIVE  (NEGATIVE)   10/17/18  16:15    


 


Urine Ketones  NEGATIVE  (NEGATIVE)   10/17/18  16:15    


 


Urine Blood  2+  (NEGATIVE)  H  10/17/18  16:15    


 


Urine Nitrate  NEGATIVE  (NEGATIVE)   10/17/18  16:15    


 


Urine Bilirubin  NEGATIVE  (NEGATIVE)   10/17/18  16:15    


 


Urine Urobilinogen  NEGATIVE EU (0.2-1.0)   10/17/18  16:15    


 


Ur Leukocyte Esterase  NEGATIVE  (NEGATIVE)   10/17/18  16:15    


 


Urine RBC  5-10 /hpf (0-3)  H  10/17/18  16:15    


 


Urine WBC  1-3 /hpf (0-3)   10/17/18  16:15    


 


Ur Epithelial Cells  TRACE /lpf (NONE-1+)   10/17/18  16:15    


 


Amorphous Sediment  PRESENT /hpf (NONE-1+)   10/17/18  16:15    


 


Hyaline Casts  1-5 /lpf (0-1)   10/17/18  16:15    


 


Urine Glucose  NEGATIVE  (NEGATIVE)   10/17/18  16:15    








Visualized and Interpreted Chest x-ray results: Yes


Chest X-Ray results: no infiltrate


Visualized and Interpreted EKG results: Yes


EKG Interpretation: Positive for: normal sinsus rhythm





Assessment & Plan


Assessment: 








Elevated WBC count (Acute)


Weakness (Acute)





81 yo F with hx of melanoma with wide excision and post op MSSA excision 

presenting with leukocytosis, pre hospitalization fever and generalized malaise 

concerning for recurrent post op wound infection





# post op wound infection: the lower extremity has some features concerning for 

recurrent infection including surrounding erythema and some purulent appearing 

wound discharge. Did discuss with Dr. Brito and requested that he evaluate the 

wound as this is the first time I have seen it. Will start cefazolin given hx 

of MSSA, f/u blood and wound cultures. Requested ID consult in am. 


# leukocytosis/possible sepsis: as above, has leukocytosis and reports of fever 

at home though has been afebrile here and otherwise does not currently meet 

SIRS criteria, HD stable.


# sandra: in setting of presumed infection as well as n/v yesterday and poor po 

intake. Likely pre renal. Will hold lisinopril, will provide IVF overnight and 

recheck. 


# melanoma: s/p wide excision as above


# copd: without e/o acute exacerbation, continue prn BDs


# DM: with Hgb A1c of 7.1, not on any DM meds, glucose relatively low, will 

monitor


# htn: BP only mildly elevated currently, will hold lisinopril given sandra


# IP status, will need > 48 hours stay for eval mgmt of above


Patient new to my care. Old records reviewed and summarized as above. Care plan 

reviewed with ER doctor and DR. Brito as above. Further hx obtained from 

patients  present at bedside.

## 2018-10-17 NOTE — CPEKG
Test Reason : OPEN

Blood Pressure : ***/*** mmHG

Vent. Rate : 079 BPM     Atrial Rate : 078 BPM

   P-R Int : 141 ms          QRS Dur : 098 ms

    QT Int : 363 ms       P-R-T Axes : 053 -47 075 degrees

   QTc Int : 417 ms

 

Sinus rhythm

Left anterior fascicular block

 

Confirmed by Jeffery Perez ([id number]) on 10/17/2018 5:14:21 PM

 

Referred By:             Confirmed By:Jeffery Perez

## 2018-10-18 LAB — PLATELET # BLD: 216 10^3/UL (ref 150–400)

## 2018-10-18 RX ADMIN — SODIUM CHLORIDE SCH MLS: 900 INJECTION, SOLUTION INTRAVENOUS at 09:08

## 2018-10-18 RX ADMIN — PRAVASTATIN SODIUM SCH MG: 40 TABLET ORAL at 20:40

## 2018-10-18 RX ADMIN — Medication SCH MLS: at 04:02

## 2018-10-18 RX ADMIN — GABAPENTIN SCH MG: 300 CAPSULE ORAL at 20:40

## 2018-10-18 RX ADMIN — THERA TABS SCH EACH: TAB at 11:43

## 2018-10-18 RX ADMIN — Medication SCH UNITS: at 08:57

## 2018-10-18 RX ADMIN — Medication SCH MLS: at 11:43

## 2018-10-18 RX ADMIN — ASPIRIN 81 MG SCH MG: 81 TABLET ORAL at 20:40

## 2018-10-18 RX ADMIN — LEVOTHYROXINE SODIUM SCH MCG: 150 TABLET ORAL at 06:18

## 2018-10-18 NOTE — GCON
INFECTIOUS DISEASE CONSULTATION.



DATE OF CONSULTATION:  10/18/2018





REASON FOR CONSULTATION:  Leukocytosis. 



Physician requesting consultation is Janine Peralta MD.



HISTORY OF PRESENT ILLNESS:  This is an 82-year-old woman with a past medical 
history of COPD, atrial fibrillation, whose current problems date back to 
recent diagnosis of melanoma and she underwent a sentinel lymph node biopsy and 
removal of melanoma 09/13/2018.  Pathology showed nodular melanoma with a 
lentigo malignant, classified as T1b N0, whose course was complicated by 
admission 09/21 through 09/28 for left lower extremity wound infection.  ID 
service was consulted at this time for evaluation of left lower extremity 
cellulitis and cultures were obtained which grew MSSA.  The patient was started 
on high-dose cefazolin which she received until discharged on the 28th and was 
discharged on 1 week of Keflex which would be ending approximately October 5.  
According to her , she has been in her usual state of health, but 
yesterday, Dr. Miller was called with patient with hypoxia, therefore, 
transferred to the emergency room was recommended.  In addition, patient had a 
low-grade fever to 99.8. The patient is tachypneic at the time of my exam, but 
denies shortness of breath or cough.  She had 1 loose stool at [facility], but 
denies diarrhea or abdominal pain.



REVIEW OF SYSTEMS:  A complete 10-point review of systems was performed and is 
negative except as mentioned in the HPI.



PAST MEDICAL HISTORY:  Melanoma, COPD, hypertension, coronary artery disease, 
atrial fibrillation, hyperlipidemia, osteoporosis, hypothyroidism, macular 
degeneration, pulmonary hypertension, peripheral neuropathy.



PAST SURGICAL HISTORY:  Bilateral knee arthroplasties, and the melanoma surgery 
as per HPI.



MEDICATIONS:  Current medications, cefazolin 2 g IV q. 8.  Lovenox 40 subcu 
daily.  No immunosuppressants.



ALLERGIES:  Penicillin associated with facial swelling and erythema.



SOCIAL HISTORY:  .  Former smoker.  No alcohol.  Lives in assisted 
living.



FAMILY HISTORY:  Positive for coronary artery disease.



PHYSICAL EXAM:  VITAL SIGNS: /78, HR is 79, RR 28, saturation 95% on 3 L, 
temperature 36.7. Her T-max during her hospitalization is 37.6. GENERAL: This 
is a woman in mild respiratory distress, but is speaking clearly.  She is 
oriented x4 and able to answer questions appropriately.  HEENT:  She is blind.  
She has dry mucous membranes and dentures in place.  No conjunctival 
hemorrhage.  No jaundice.  NECK:  Supple.  No lymphadenopathy.  CARDIOVASCULAR:
  Regular rate, 2/6 systolic murmur. CHEST: Coarse breath sounds bilaterally.  
ABDOMEN: Obese, soft, nontender.  Bowel sounds are present. 

MUSCULOSKELETAL: Her left lower extremity with a very large anterior shin wound 
with granulation tissue in the base with serosanguineous drainage.  No 
surrounding erythema, but her foot was edematous with faint erythema and warmth 
and tenderness to palpation.  No crepitus.  No lymphangitis on her upper thigh.
  NEUROLOGIC: She appeared to be moving all 4 extremities equally.  SKIN:  As 
per extremity exam.  Otherwise, no rash.  No peripheral stigmata of 
endocarditis.



LABORATORY:  White count 19, hematocrit 41, platelets of 216, 72% neutrophils, 
13% lymphocytes.  White count on admission was 21,000.  Prior white counts 
during her last hospitalization ranged from 21-12,000.  Creatinine 0.9, on 
admission was 1.4.  



Imaging: Chest x-ray showed no focal infiltrate.  CT angio is pending at the 
time of my exam.  



Past cultures showed MSSA.  Repeat wound culture was performed on admission and 
blood cultures which are also pending.



ASSESSMENT AND PLAN:  This is an 82-year-old woman with a past medical history 
of melanoma with a large persistent wound of her left lower extremity, who 
presents to the hospital with worsening leukocytosis, hypoxia.  On exam today 
she does have mild left foot cellulitis, but it is hard to imagine this degree 
of cellulitis leading to the degree of leukocytosis that she has, episode of 
SVT and worsening hypoxia.  Therefore, would consider occult pneumonia versus 
pulmonary embolus.  Also could consider cardiac origin.

1.  While awaiting CT chest due to illness, would change Ancef to ceftriaxone 
for better coverage of community-acquired pneumonia.  She has had recent Ancef 
but doubt this will change her typical organisms causing pneumonia.

2.  Ceftriaxone, would continue to cover left lower extremity cellulitis with 
MSSA.

3.  Follow blood cultures.

4.  We will continue to assess whether her left lower extremity needs imaging, 
but at this point due to mild presence of cellulitis in the absence of severe 
pain, will hold off on any imaging.  

The time was 75 minutes, greater than 50% of time spent in education and 
counseling of the patient's , coordination of care with surgical 
services and nursing and hospitalist team.  



Thank you for this consultation.  We will continue to see her on a daily basis.





Job #:  [id number]

MTDD

## 2018-10-18 NOTE — ASMTCMCOM
CM Note

 

CM Note                       

Notes:

Pt admitted to hospital from [facility] where pt is rehabbing. She lives at [facility] with her 

. Pt was brought in for an infected left leg melanoma, per MD pt will have wound vac placed 

tomorrow.



DC Plan: SNF/ [facility]

 

Date Signed:  10/18/2018 04:47 PM

Electronically Signed By:Svitlana Garcia RN

## 2018-10-18 NOTE — HOSPPROG
Hospitalist Progress Note


Assessment/Plan: 


81 yo F with hx of melanoma with wide excision and post op MSSA excision 

presenting with leukocytosis, pre hospitalization fever and generalized malaise 

concerning for recurrent post op wound infection





# post op wound infection


- Lower extremity has some features concerning for recurrent infection 

including surrounding erythema and some purulent appearing wound discharge


- Surgery consulted 


- Continue cefazolin given hx of MSSA


- F/u blood and wound cultures. 


- ID to consult  this AM





# Leukocytosis


- WBC improved overnight 21.8>19.3


- HD stable.


- Continue to monitor CBC





# ARF


- In setting of presumed infection as well as n/v and poor po intake


-  Likely pre renal, Cr improved to 0.9 this AM


- Holding lisinopril, restart as needed


- S/p IVF overnight





# Melanoma: s/p wide excision as above





# COPD: without e/o acute exacerbation, continue prn BDs





# DM: with Hgb A1c of 7.1, not on any DM meds, glucose relatively low, will 

monitor





# htn: BP WNL this AM, holding lisinopril, restart as needed





Dispo: Pending clinical course


Subjective: Patient reports no complaints this AM


Objective: 


 Vital Signs











Temp Pulse Resp BP Pulse Ox


 


 36.6 C   84   28 H  119/73   94 


 


 10/18/18 07:15  10/18/18 07:15  10/18/18 07:15  10/18/18 07:15  10/18/18 07:15








 Microbiology











 10/18/18 04:10 Gram Stain - Final





 Leg - Swab 








 Laboratory Results





 10/18/18 09:05 





 10/18/18 09:05 





 











 10/17/18 10/18/18 10/19/18





 05:59 05:59 05:59


 


Intake Total  865 


 


Output Total  400 


 


Balance  465 








 











PT  16.7 SEC (12.0-15.0)  H  10/17/18  16:30    


 


INR  1.33  (0.83-1.16)  H  10/17/18  16:30    














- Physical Exam


Constitutional: no apparent distress, chronically ill appearing, obese


Eyes: PERRL


Ears, Nose, Mouth, Throat: moist mucous membranes


Cardiovascular: regular rate and rhythym


Respiratory: clear to auscultation


Gastrointestinal: soft, non-tender abdomen


Genitourinary: no bladder tenderness


Skin: warm, erythema, pressure ulcer


Neurologic: AAOx3


Psychiatric: interacting appropriately





ICD10 Worksheet


Patient Problems: 


 Problems











Problem Status Onset


 


Elevated WBC count Acute  


 


Weakness Acute  


 


Bronchospasm Acute  


 


Pneumonia Acute  


 


Severe sepsis Acute  


 


Wound infection Acute

## 2018-10-18 NOTE — SOAPPROG
SOAP Progress Note


Assessment/Plan: 


Assessment:





83 yo well known to me.  Wounds actually improved from discharge.  Pale 

granulation tissue but erythema much improved


Will replace wound vac tomorrow (use veraflo if staying in house for a few days)


On schedule for skin graft in 2 weeks.  




















Plan:





10/18/18 12:23





Objective: 





 Vital Signs











Temp Pulse Resp BP Pulse Ox


 


 37.4 C   84   20   126/77 H  86 L


 


 10/18/18 12:00  10/18/18 07:15  10/18/18 12:00  10/18/18 12:00  10/18/18 12:00








 Microbiology











 10/18/18 04:10 Gram Stain - Final





 Leg - Swab 








 Laboratory Results





 10/18/18 09:05 





 10/18/18 09:05 





 











 10/17/18 10/18/18 10/19/18





 05:59 05:59 05:59


 


Intake Total  865 


 


Output Total  400 


 


Balance  465 








 











PT  16.7 SEC (12.0-15.0)  H  10/17/18  16:30    


 


INR  1.33  (0.83-1.16)  H  10/17/18  16:30    














ICD10 Worksheet


Patient Problems: 


 Problems











Problem Status Onset


 


Elevated WBC count Acute  


 


Weakness Acute  


 


Bronchospasm Acute  


 


Pneumonia Acute  


 


Severe sepsis Acute  


 


Wound infection Acute

## 2018-10-18 NOTE — PDMN
Medical Necessity


Medical necessity: Pt meets inpt criteria per MD order and MCG M-70, 

Cellulitis. 81 y/o w/recent hx of melanoma w/wide excision and post-op MSSA 

presents w/leukocytosis (WBC's 21.86), pre-hosp fever, and gen malaise, 

erythema and purulent drainage from LLE wound. Admitted for recurrent post-op 

wound infection, GERARDO w/ BUN, creat 37, 1.4 on admission. Surgical and ID 

consults and following, IV ABX's, IVF, wound vac to be replaced tomorrow, PT/

OT. Other comorbidities include adv age, CAD w/stent, morbid obesity, COPD, HTN

, periph neuropathy, gait instability, afib. Anticipate>2MN for ongoing med nec 

management of recurrent wound infection.

## 2018-10-19 LAB — PLATELET # BLD: 212 10^3/UL (ref 150–400)

## 2018-10-19 RX ADMIN — GABAPENTIN SCH MG: 300 CAPSULE ORAL at 20:02

## 2018-10-19 RX ADMIN — Medication SCH UNITS: at 11:19

## 2018-10-19 RX ADMIN — THERA TABS SCH EACH: TAB at 11:19

## 2018-10-19 RX ADMIN — Medication SCH MLS: at 23:16

## 2018-10-19 RX ADMIN — ENOXAPARIN SODIUM SCH MG: 100 INJECTION SUBCUTANEOUS at 11:19

## 2018-10-19 RX ADMIN — ASPIRIN 81 MG SCH MG: 81 TABLET ORAL at 20:02

## 2018-10-19 RX ADMIN — PRAVASTATIN SODIUM SCH MG: 40 TABLET ORAL at 20:02

## 2018-10-19 RX ADMIN — POLYETHYLENE GLYCOL 3350 PRN GM: 17 POWDER, FOR SOLUTION ORAL at 13:04

## 2018-10-19 RX ADMIN — LEVOTHYROXINE SODIUM SCH MCG: 150 TABLET ORAL at 05:18

## 2018-10-19 RX ADMIN — Medication SCH MLS: at 13:31

## 2018-10-19 NOTE — ASMTLACE
LACE

 

Length of stay for            Answers:  2 days                                

current admission                                                             

Acuity / Level of             Answers:  Yes                                   

Care: Did the patient                                                         

have an inpatient                                                             

admission?                                                                    

Comorbidities - select        Answers:  Chronic pulmonary disease             

all that apply                                                                

                                        Coronary Artery Disease               

                                        Other                         Notes:  HTN; AFib; Macular 

                                                                              degeneration

# of Emergency department     Answers:  1-2                                   

visits in the last 6                                                          

months                                                                        

Score: 11

 

Date Signed:  10/19/2018 02:15 PM

Electronically Signed By:Svitlana Garcia RN

## 2018-10-19 NOTE — SOAPPROG
SOAP Progress Note


Assessment/Plan: 


Assessment/Plan: 83yo F s/p WLE of LLE melanoma, admitted with bactremia. 

Multiple comorbidities


Pain controlled


IV antibiotics. ID following


Wound with pale granulation tissue. If she stays through the weekend, recommend 

application of Veraflo


Plan skin graft 2 weeks


Appreciate hospitalist management of comorbidities





S: no complaints today


O: laying in bed, comfortable, NAD


No increased WOB


Peripheral edema 2+ B


LLE wound with pale granulation in base, no surrounding erythema. Redressed 

with damp gauze, ABD








Objective: 





 Vital Signs











Temp Pulse Resp BP Pulse Ox


 


 36.7 C   78   26 H  125/80 H  94 


 


 10/19/18 11:38  10/19/18 11:38  10/19/18 11:38  10/19/18 11:38  10/19/18 11:38








 Microbiology











 10/18/18 04:10 Gram Stain - Final





 Leg - Swab 








 Laboratory Results





 10/19/18 05:15 





 10/18/18 09:05 





 











 10/18/18 10/19/18 10/20/18





 05:59 05:59 05:59


 


Intake Total 865 1075 


 


Output Total 400 1500 


 


Balance 465 -425 








 











PT  16.7 SEC (12.0-15.0)  H  10/17/18  16:30    


 


INR  1.33  (0.83-1.16)  H  10/17/18  16:30    














ICD10 Worksheet


Patient Problems: 


 Problems











Problem Status Onset


 


Elevated WBC count Acute  


 


Weakness Acute  


 


Bronchospasm Acute  


 


Pneumonia Acute  


 


Severe sepsis Acute  


 


Wound infection Acute

## 2018-10-19 NOTE — PDIAF
- Diagnosis


Diagnosis: cellulitis of foot; slow healing wound after melanoma resectipon


Code Status: Full Code





- Medication Management


Discharge Medications: 


 Medications to Continue on Transfer





Amitriptyline HCl [Elavil 10 mg (*)] 10 mg PO HS 08/07/15 [Last Taken 10/16/18]


Cholecalciferol Vit D3 [Vitamin D3 (*)] 2,000 units PO DAILY 08/07/15 [Last 

Taken 10/17/18]


DULoxetine [Cymbalta 60 MG (*)] 60 mg PO HS 08/07/15 [Last Taken 10/16/18]


Lisinopril [Zestril 5 mg (*)] 5 mg PO DAILY@08 08/07/15 [Last Taken 10/17/18]


Aspirin [Aspirin 81mg (*)] 81 mg PO HS 07/26/16 [Last Taken 10/16/18]


Multivitamins [Multivitamin (*)] 1 each PO DAILY@12 07/26/16 [Last Taken 10/17/

18]


Pravastatin Sodium [Pravachol] 40 mg PO HS 07/26/16 [Last Taken 10/16/18]


Gabapentin [Neurontin 300 MG (*)] 600 mg PO HS 09/13/18 [Last Taken 10/16/18]


Levothyroxine [Synthroid 150 mcg (*)] 150 mcg PO DAILY06 09/13/18 [Last Taken 10

/17/18]


Acetaminophen [Tylenol 325mg (*)] 650 mg PO Q4HRS PRN  tab 09/28/18 [Last Taken 

Unknown]


Polyethylene Glycol 3350 [Miralax 17 gm (*)] 17 gm PO BID PRN 10/17/18 [Last 

Taken Unknown]


Acetaminophen [Tylenol 325mg (*)] 650 mg PO Q4HRS PRN  tab 10/19/18 [Last Taken 

Unknown]


Cephalexin [Keflex (*)] 500 mg PO TID #21 cap 10/19/18 [Last Taken Unknown]


Enoxaparin [Lovenox 40 MG (*)] 40 mg SC DAILY  syr 10/19/18 [Last Taken Unknown]








Discharge Medications: Refer to the Discharge Home Medication list for PRN 

reason.





- Orders


Services needed: Registered Nurse, Certified Nursing Aide, Master 

, Physical Therapy, Occupational Therapy


Diet Recommendation: no restrictions on diet


Diet Texture: Regular Texture Diet


Wound Care Instructions: resume wound vacc to her wound at melanoma resection 

site as previous.  Make appt fot pt to see Dr Shannan Velazquez in clinic in 7-10 days


Equipment: wound vacc, walker





- Follow Up Care


Current Providers and Referrals: 


Patient,NotPresent [Unknown] - As per Instructions


Shannan Velazquez MD [Medical Doctor] - follow up in 1 week

## 2018-10-19 NOTE — ASMTDCNOTE
Case Management Discharge

 

Discharge Order Complete?     Answers:  Yes                                   

Followup Appointment          10/25/2018 12:00 AM

Patient to Obtain             Answers:  Other                         Notes:  [facility]

Medications                                                                   

Transportation Arranged       Answers:  Other                         Notes:  [facility]


Transport will Pick (Date     10/19/2018 04:00 PM

& Time)                       

Faxed Final Orders            Answers:  Yes                                   

Family Notified               Answers:  Yes                                   

Discharge Comments            

Notes:

D/w RN, final orders faxed. Dexter goodman  notified, RN to call report.

 

Date Signed:  10/19/2018 02:53 PM

Electronically Signed By:Svitlana Garcia RN

## 2018-10-19 NOTE — PCMIDPN
Assessment/Plan: 





# LLE cellulitis, GAS, associated w wound.  Blood cx remain negative


--okay to dc from ID standpoint


--Keflex 500mg PO BID x 7 days


--okay to proceed w skin graft 10/25/18


Subjective: 





patient w/o complaints


wants to stay in the hospital until skin graft next wwe


Objective: 


 Vital Signs











Temp Pulse Resp BP Pulse Ox


 


 36.7 C   78   26 H  125/80 H  94 


 


 10/19/18 11:38  10/19/18 11:38  10/19/18 11:38  10/19/18 11:38  10/19/18 11:38








 Microbiology











 10/18/18 04:10 Gram Stain - Final





 Leg - Swab 








 Laboratory Results





 10/19/18 05:15 





 10/18/18 09:05 





 











 10/18/18 10/19/18 10/20/18





 05:59 05:59 05:59


 


Intake Total 865 1075 


 


Output Total 400 1500 


 


Balance 465 -425 














- Physical Exam


General Appearance: alert, no apparent distress


Respiratory: lungs clear, No accessory muscle use


Neck: supple


Cardiac/Chest: regular rate, rhythm


Extremities: pedal edema (LLE), other (Erythema L foot resolved; large wound 

lateral L ankle)


Abdomen: non-tender, soft


Skin: No rash


Neuro/Psych: alert, normal mood/affect, oriented x 3





- Line/s


  ** RUE PICC


Lines: No drainage, No erythema





- Time Spent With Patient


Time Spent with Patient: greater than 35 minutes (care coordinated w Dr. Martin 

and Dr. Velazquez and nursing)


Time Spent with Patient: Greater than 35 minutes spent on this patients care, 

greater than 50% of time spent counseling, educating, and coordinating care 

regarding the above mentioned plan.





ICD10 Worksheet


Patient Problems: 


 Problems











Problem Status Onset


 


Elevated WBC count Acute  


 


Weakness Acute  


 


Bronchospasm Acute  


 


Pneumonia Acute  


 


Severe sepsis Acute  


 


Wound infection Acute

## 2018-10-19 NOTE — HOSPPROG
Hospitalist Progress Note


Assessment/Plan: 





DIAGNOSES: 


* acute cellulitis wound infection of foot and leg


* Clinically improving remarkably with decrease redness swelling and pain


* hypoxia and tachypnea, acute respiratory failure with hypoxemia today


* Unclear etiology, patient has had some expiratory wheezing sound in larynx 

but minimal if any chest wheezing, no cough or shortness of breath; does have 

history of smoking but no prior history of COPD


* Did not appear to have any kind of direct improvement in airway function from 

bronchodilator neb but several minutes later this did cause her to cough up a 

very large bolus of phlegm that she believes came from her throat; after this 

she felt she was breathing more easily and the wheezing sound is notably 

improved - question if she has a URI, her voice is normal and no sore throat 

but did have some fever today


* new onset fever today


* Uncertain etiology but question if this is due to her respiratory issues


* acute renal failure


* Improved rapidly with therapy here


* melanoma status post recent excision with wound infection


* Will eventually need skin grafting to the leg





PLANS:


* Respiratory pathogen panel


* Acapella flutter devices see if this helps keep the phlegm improved


* Repeat chest x-ray in the morning





I reviewed in detail today with doctors Thea Lema and Shannan Velazquez





SUBJECTIVE:


Feel some difficulty getting in a deep breath and has a wheezing sound her 

throat which she has had before as an outpatient particularly with ambulation


Leg feels better


No chills or sweats





OBJECTIVE


Vitals reviewed:  Temperature 38.1 degrees this morning, otherwise vitals 

unremarkable





Exam:


alert oriented relaxed


skin warm dry color ok


resps mildly labored during my 1st visit with her; notably increased on the 2nd 

visit and decreased on the 3rd visit after she coughs up large bolus of phlegm


lungs there is an audible expiratory wheeze but no stridor at the laryngeal 

level, with no definite lung wheezing and no rales.  On my 2nd exam this 

expiratory laryngeal noise was louder and on the 3rd exam after she coughed up 

a large bolus phlegm it was notably quieter


heart regular


abd soft nondistended nontender, bowel sounds present


limbs warm, no edema; cellulitis notably improved today





iv site ok





Lab data:


Notable improvement again today and white blood cell count but still at 14,000











Objective: 


 Vital Signs











Temp Pulse Resp BP Pulse Ox


 


 36.7 C   76   28 H  139/81 H  96 


 


 10/19/18 16:00  10/19/18 16:00  10/19/18 16:00  10/19/18 16:00  10/19/18 16:00








 Microbiology











 10/18/18 04:10 Gram Stain - Final





 Leg - Swab 








 Laboratory Results





 10/19/18 05:15 





 10/18/18 09:05 





 











 10/18/18 10/19/18 10/20/18





 06:59 06:59 06:59


 


Intake Total 865 1075 500


 


Output Total 400 1500 100


 


Balance 465 -425 400








 











PT  16.7 SEC (12.0-15.0)  H  10/17/18  16:30    


 


INR  1.33  (0.83-1.16)  H  10/17/18  16:30    














- Time Spent With Patient


Time Spent with Patient: greater than 35 minutes


Time Spent with Patient: Greater than 35 minutes spent on this patients care, 

greater than 50% of time spent counseling, educating, and coordinating care 

regarding the above mentioned plan.





ICD10 Worksheet


Patient Problems: 


 Problems











Problem Status Onset


 


Elevated WBC count Acute  


 


Weakness Acute  


 


Bronchospasm Acute  


 


Pneumonia Acute  


 


Severe sepsis Acute  


 


Wound infection Acute

## 2018-10-19 NOTE — PDDCSUM
Discharge Summary


Discharge Summary: 





DISCHARGE DIAGNOSES:


* cellulitis





CONSULTANTS:


Caroline Velazquez and [facility]





PROCEDURES:


CT of chest showing no PE and no pneumonia, no acute changes





HOSPITAL COURSE SUMMARY:


This patient who had a melanoma removed from her leg previously has been 

fighting wound healing and infection issues.  She was sent here from nursing 

facility where she was rehabing with fever, malaise and evidence of cellulitis 

of her leg and foot.  She has grown group A strep from wound culture at 

present.  (previous infection was with staph in culture)   She was admitted 

here and treated with antibiotic and has responded quite well with resolution 

of fever and cellulitis findings.  She is stable for DC from this standpoint, 

needs ongoing wound care w wound vac and evetual definitive surgical closure.





She did have some dypsnea and hypoxemia on day 3 with an audible laryngeal 

wheeze.  There was no sign of laryngeal edema but she did have a couple very 

large expectorations of thick phlegm after a albuterol neb and use of flutter 

valve device, with prompt resolution of symptoms and hypoxemia.  There was a 

fever with this but a respiratory pathogen panel was negative.  She will be 

treated with a short course of steroid for this issue.





PENDING TEST RESULTS:


None





MEDICATION CHANGES:


Addition of Keflex 500 mg three times daily for 1 week


prednisone 20 mg daily 2 more days





FOLLOW-UP PLAN:


The patient will be transferred from here to [facility] at 

this time.  She will come under the care of the physician and nursing staff 

there.  She will have ongoing wound VAC care there.





The patient is scheduled to come back here in approximately 2 weeks for 

consideration of skin grafting.  The patient will see Dr. Shannan Velazquez in the 

interim for further assessment of her wound.





Greater than 35 minutes bedside and care coordination time today

## 2018-10-19 NOTE — CPEKG
Test Reason : OPEN

Blood Pressure : ***/*** mmHG

Vent. Rate : 083 BPM     Atrial Rate : 083 BPM

   P-R Int : 149 ms          QRS Dur : 099 ms

    QT Int : 376 ms       P-R-T Axes : 065 -41 073 degrees

   QTc Int : 442 ms

 

Sinus rhythm

Left axis deviation

Anteroseptal infarct, old

 

Confirmed by Damian Villa ([id number]) on 10/19/2018 9:16:07 AM

 

Referred By:             Confirmed By:Damian Villa

## 2018-10-20 VITALS — SYSTOLIC BLOOD PRESSURE: 135 MMHG | DIASTOLIC BLOOD PRESSURE: 94 MMHG

## 2018-10-20 RX ADMIN — Medication SCH UNITS: at 09:20

## 2018-10-20 RX ADMIN — THERA TABS SCH EACH: TAB at 12:46

## 2018-10-20 RX ADMIN — ENOXAPARIN SODIUM SCH MG: 100 INJECTION SUBCUTANEOUS at 09:20

## 2018-10-20 RX ADMIN — POLYETHYLENE GLYCOL 3350 PRN GM: 17 POWDER, FOR SOLUTION ORAL at 12:46

## 2018-10-20 RX ADMIN — Medication SCH MLS: at 14:43

## 2018-10-20 RX ADMIN — Medication SCH MLS: at 05:58

## 2018-10-20 RX ADMIN — LEVOTHYROXINE SODIUM SCH MCG: 150 TABLET ORAL at 05:58

## 2018-10-20 NOTE — SOAPPROG
SOAP Progress Note


Assessment/Plan: 


Assessment:


82 FEMALE ADMITTED WITH LETHARGY AND POSSIBLEINFECTION AT LEFT LEG MELANOMA 

EXCISION SITE





FULL DISTAL PULSES/  15 CM OPEN WOUND WITH POOR GRANULATION AND MILD ERYTHEMA


AFEBRILE BUT WBC 21K























Plan:BID WET/DRY DRESSINGS/  ABX/  LEAVE VAC OFF UNTIL BETTER GRANULATION





10/17/18 21:43





10/20/18 12:52


AFEBRILE/ MORE ALERT/WOUND STABLE/PLAN TO TRANSFER TO [facility] WITH FOLLOW-UP 

POSSIBLE SURGERY WITH DR. ROCK  NEXT WEEK


PATIENT IS HESITANT TO LEAVE BUT THERE IS NO NEED FOR PROLONGED HOSPITALIZATION


Objective: 





 Vital Signs











Temp Pulse Resp BP Pulse Ox


 


 36.5 C   75   20   135/94 H  90 L


 


 10/20/18 11:50  10/20/18 11:50  10/20/18 11:50  10/20/18 11:50  10/20/18 11:50








 Microbiology











 10/18/18 04:10 Gram Stain - Final





 Leg - Swab Wound Culture - Final





    Streptococcus Pyogenes Grp A


 


 10/19/18 16:55 Respiratory Panel (PCR) - Final





 Nasal, Sinus - [facility]    No Organism Detected








 Laboratory Results





 10/19/18 05:15 





 10/18/18 09:05 





 











 10/19/18 10/20/18 10/21/18





 05:59 05:59 05:59


 


Intake Total 1075 1160 300


 


Output Total 1500 500 


 


Balance -425 660 300








 











PT  16.7 SEC (12.0-15.0)  H  10/17/18  16:30    


 


INR  1.33  (0.83-1.16)  H  10/17/18  16:30    














ICD10 Worksheet


Patient Problems: 


 Problems











Problem Status Onset


 


Elevated WBC count Acute  


 


Weakness Acute  


 


Bronchospasm Acute  


 


Pneumonia Acute  


 


Severe sepsis Acute  


 


Wound infection Acute

## 2018-10-20 NOTE — ASMTCMCOM
CM Note

 

CM Note                       

Notes:

Reviewed chart. Per MD notes, pt to discharge to [facility] today. Several calls placed to Fatmata Ramirez at [facility] to arrange for discharge. Left voice messages. Call placed to 

Mabel,  at [facility], left message for Fatmata. Update provided to SCOTT Rubin. 



All discharge orders and paperwork sent via [employer]. Call received from Fatmata; confirmed 

acceptance for today. Transportation arranged and to be paid for by Fatmata at [facility]. [place] to provide wheelchair to transport at 1530. Update provided to pt and family. IM signed. Pt 

to follow up as directed. CM available for any further issues or concerns.



Discharge Plan: [facility] SNF

 

Date Signed:  10/20/2018 05:29 PM

Electronically Signed By:Brandie Miranda RN

## 2018-10-20 NOTE — ASDISCHSUM
----------------------------------------------

Discharge Information

----------------------------------------------

Plan Status:SNF                                      Medically Cleared to Leave:10/20/2018

Discharge Date:10/20/2018 03:59 PM                   CM D/C Disposition:Skilled Nursing Facility

ADT D/C Disposition:Skilled Nursing Facility         Projected Discharge Date:10/20/2018 11:00 AM

Transportation at D/C:Wheelchair Van                 Discharge Delay Reason:

Follow-Up Date:10/20/2018 11:00 AM                   Discharge Slot:2 - 12:01 pm - 18:00 pm

Final Diagnosis:Increased WBC, cellulitis, melanoma, dyspnea, hypoxemia

----------------------------------------------

Placement Information

----------------------------------------------

Referral Type:*Nursing Home/SNF                      Referral ID:[id number]

Provider Name:[facility]

Address 1:[address]                             Phone [phone]

Address 2:                                           Fax [phone]

City:[place]                                         Selection Factors:Patient/Family Choice; Retur
[place] to Facility

State:[place]

 

----------------------------------------------

Patient Contact Information

----------------------------------------------

Contact Name:VITOR                           Relationship:

Address:[address] [place] [address] [phone]

                                                     Work [phone]

City:[place]                                         Alternate Phone:

State/Zip Code:[place] [address]                              Email:

----------------------------------------------

Financial Information

----------------------------------------------

Financial Class:Medicare Advantage [employer]

Primary Plan Desc:[employer] [facility] ADVANT[employer] PLANS         Primary Plan Number:[id number]

Secondary Plan Desc:                                 Secondary Plan Number:

 

 

----------------------------------------------

Assessment Information

----------------------------------------------

----------------------------------------------

LACE

----------------------------------------------

LACE

 

Length of stay for            Answers:  2 days                                

current admission                                                             

Acuity / Level of             Answers:  Yes                                   

Care: Did the patient                                                         

have an inpatient                                                             

admission?                                                                    

Comorbidities - select        Answers:  Chronic pulmonary disease             

all that apply                                                                

                                        Coronary Artery Disease               

                                        Other                         Notes:  HTN; AFib; Macular 

                                                                              degeneration

# of Emergency department     Answers:  1-2                                   

visits in the last 6                                                          

months                                                                        

Score: 11

 

Date Signed:  10/19/2018 02:15 PM

Electronically Signed By:Svitlana Garcia RN

 

 

----------------------------------------------

[facility] Progress Note

----------------------------------------------

CM Note

 

CM Note                       

Notes:

Pt admitted to hospital from [facility] where pt is rehabbing. She lives at [facility] with her 

. Pt was brought in for an infected left leg melanoma, per MD pt will have wound vac placed 

tomorrow.



DC Plan: SNF/ [facility]

 

Date Signed:  10/18/2018 04:47 PM

Electronically Signed By:Svitlana Garcia RN

 

 

----------------------------------------------

Case Management Discharge Plan Note

----------------------------------------------

Case Management Discharge

 

Discharge Order Complete?     Answers:  Yes                                   

Followup Appointment          10/25/2018 12:00 AM

Patient to Obtain             Answers:  Other                         Notes:  [facility]

Medications                                                                   

Transportation Arranged       Answers:  Other                         Notes:  [facility]


Transport will Pick (Date     10/19/2018 04:00 PM

& Time)                       

Faxed Final Orders            Answers:  Yes                                   

Family Notified               Answers:  Yes                                   

Discharge Comments            

Notes:

D/w RN, final orders faxed. Dex[place] at  notified, RN to call report.

 

Date Signed:  10/19/2018 02:53 PM

Electronically Signed By:Svitlana Garcia RN

 

 

----------------------------------------------

[facility] CM Progress Note

----------------------------------------------

CM Note

 

CM Note                       

Notes:

Reviewed chart. Per MD notes, pt to discharge to [facility] today. Several calls placed to Fatmata Ramirez at [facility] to arrange for discharge. Left voice messages. Call placed to 

Mabel,  at [facility], left message for Fatmata. Update provided to SCOTT Rubin. 



All discharge orders and paperwork sent via [employer]. Call received from Fatmata; confirmed 

acceptance for today. Transportation arranged and to be paid for by Fatmata at [facility]. [facility] to provide wheelchair to transport at 1530. Update provided to pt and family. IM signed. Pt 

to follow up as directed. CM available for any further issues or concerns.



Discharge Plan: [facility] SNF

 

Date Signed:  10/20/2018 05:29 PM

Electronically Signed By:Brandie Miranda RN

 

 

----------------------------------------------

Intervention Information

----------------------------------------------

Intervention Type:*IM-Signed                         Date of Service:10/20/2018 05:23 PM

Patient Type:Inpatient                               Staff Member:SCOTT Miranda, Brandie

Hours:                                               Discipline:

Severity:                                            Comment:

## 2018-10-22 ENCOUNTER — HOSPITAL ENCOUNTER (INPATIENT)
Dept: HOSPITAL 80 - FED | Age: 83
LOS: 3 days | Discharge: NURSING, REHAB OR LONG-TERM CARE | DRG: 191 | End: 2018-10-25
Attending: FAMILY MEDICINE | Admitting: FAMILY MEDICINE
Payer: COMMERCIAL

## 2018-10-22 DIAGNOSIS — J98.11: ICD-10-CM

## 2018-10-22 DIAGNOSIS — I47.1: ICD-10-CM

## 2018-10-22 DIAGNOSIS — M81.0: ICD-10-CM

## 2018-10-22 DIAGNOSIS — I25.10: ICD-10-CM

## 2018-10-22 DIAGNOSIS — J44.1: Primary | ICD-10-CM

## 2018-10-22 DIAGNOSIS — E03.9: ICD-10-CM

## 2018-10-22 DIAGNOSIS — L08.9: ICD-10-CM

## 2018-10-22 DIAGNOSIS — I48.0: ICD-10-CM

## 2018-10-22 DIAGNOSIS — E78.5: ICD-10-CM

## 2018-10-22 DIAGNOSIS — H35.30: ICD-10-CM

## 2018-10-22 DIAGNOSIS — I10: ICD-10-CM

## 2018-10-22 DIAGNOSIS — Z85.820: ICD-10-CM

## 2018-10-22 DIAGNOSIS — Z95.5: ICD-10-CM

## 2018-10-22 LAB — PLATELET # BLD: 236 10^3/UL (ref 150–400)

## 2018-10-22 PROCEDURE — G0378 HOSPITAL OBSERVATION PER HR: HCPCS

## 2018-10-22 RX ADMIN — BUDESONIDE SCH MG: 0.5 INHALANT RESPIRATORY (INHALATION) at 11:39

## 2018-10-22 RX ADMIN — BUDESONIDE SCH MG: 0.5 INHALANT RESPIRATORY (INHALATION) at 20:15

## 2018-10-22 RX ADMIN — PRAVASTATIN SODIUM SCH MG: 40 TABLET ORAL at 20:52

## 2018-10-22 RX ADMIN — CEPHALEXIN SCH MG: 500 CAPSULE ORAL at 21:05

## 2018-10-22 RX ADMIN — ASPIRIN 81 MG SCH MG: 81 TABLET ORAL at 20:51

## 2018-10-22 RX ADMIN — GABAPENTIN SCH MG: 300 CAPSULE ORAL at 20:51

## 2018-10-22 RX ADMIN — IPRATROPIUM BROMIDE AND ALBUTEROL SULFATE SCH: .5; 3 SOLUTION RESPIRATORY (INHALATION) at 16:39

## 2018-10-22 RX ADMIN — THERA TABS SCH EACH: TAB at 13:07

## 2018-10-22 RX ADMIN — IPRATROPIUM BROMIDE AND ALBUTEROL SULFATE SCH ML: .5; 3 SOLUTION RESPIRATORY (INHALATION) at 11:39

## 2018-10-22 RX ADMIN — METOPROLOL TARTRATE SCH MG: 25 TABLET, FILM COATED ORAL at 20:52

## 2018-10-22 RX ADMIN — IPRATROPIUM BROMIDE AND ALBUTEROL SULFATE SCH ML: .5; 3 SOLUTION RESPIRATORY (INHALATION) at 04:59

## 2018-10-22 RX ADMIN — CEPHALEXIN SCH MG: 500 CAPSULE ORAL at 16:07

## 2018-10-22 RX ADMIN — IPRATROPIUM BROMIDE AND ALBUTEROL SULFATE SCH ML: .5; 3 SOLUTION RESPIRATORY (INHALATION) at 20:15

## 2018-10-22 NOTE — ASMTCMCOM
CM Note

 

CM Note                       

Notes:

81yo female, recent discharge from [facility] due to Cellulitis, admitted for COPD 

exascerbation, Weakness, PNA, Sepsis, Wound infection. She had been at [facility] for Rehab and 

will return there when discharged. CM to follow.

 

Date Signed:  10/22/2018 02:43 PM

Electronically Signed By:Belinda Castanon LCSW

## 2018-10-22 NOTE — EDPHY
H & P


Stated Complaint: SOB and wheezing that worsened this


Time Seen by Provider: 10/22/18 01:26


HPI/ROS: 





Chief Complaint:  Cough, wheeze





HPI:  82-year-old woman with a history of COPD, hypertension, currently in 

[facility] for rehab for a cellulitis and recurrent wound infection status post 

a melanoma removed from her left leg.  She woke this morning with worsening 

dyspnea.  Was noted to have diffuse expiratory wheezing.  She is given 

breathing treatments without relief.  EMS was called.  They gave her a DuoNeb 

is started her on CPAP.  Patient was just discharged from the hospital 2 days 

ago after admission for leg infection.  She states she currently feels 

improved.  No chest pain.  Continue to have some shortness of breath.





ROS:  10 systems were reviewed and were negative except those elements noted in 

the HPI.





PMH:  Hypertension, COPD, left leg wound infection with cellulitis





Social History:  Currently residing in [facility]





Family History: non-contributory





Physical Exam:


Gen: Awake, Alert, No Distress


HEENT:  


     Nose: no rhinorrhea


     Eyes: PERRLA, EOMI


     Mouth: Moist mucosa 


Neck: Supple, no JVD


Chest: nontender, diffuse expiratory wheezing, no focal rales or rhonchi


Heart: S1, S2 normal, no murmur


Abd: Soft, non-tender, no guarding


Back: no CVA tenderness, no midline tenderness 


Ext: no edema, left leg wound VAC is in place, there is moderate cellulitis.


Skin: no rash


Neuro: CN II-XII intact, Sensation grossly intact, Strength 5/5 in bilateral 

upper and lower extremities








- Personal History


Current Tetanus Diphtheria and Acellular Pertussis (TDAP): Yes


Tetanus Vaccine Date: <10 years





- Medical/Surgical History


Hx Asthma: No


Hx Chronic Respiratory Disease: No


Hx Diabetes: No


Hx Cardiac Disease: No


Hx Renal Disease: No


Hx Cirrhosis: No


Hx Alcoholism: No


Hx HIV/AIDS: No


Hx Splenectomy or Spleen Trauma: No


Other PMH: hyperlipidemia, HTN, hypothyroid, cardiac stent, melanoma, 

peripheral neuropathy, wound vac left shin, legally blind





- Social History


Smoking Status: Former smoker


Constitutional: 


 Initial Vital Signs











Temperature (C)  36.6 C   10/22/18 01:26


 


Heart Rate  83   10/22/18 01:26


 


Respiratory Rate  24 H  10/22/18 01:26


 


Blood Pressure  155/85 H  10/22/18 01:26


 


O2 Sat (%)  90 L  10/22/18 01:26








 











O2 Delivery Mode               Humidified Face Tent


 


O2 (L/minute)                  10














Allergies/Adverse Reactions: 


 





Penicillins Allergy (Unknown, Verified 10/22/18 01:25)


 








Home Medications: 














 Medication  Instructions  Recorded


 


Amitriptyline HCl [Elavil 10 mg 10 mg PO HS 08/07/15





(*)]  


 


Cholecalciferol Vit D3 [Vitamin D3 2,000 units PO DAILY 08/07/15





(*)]  


 


DULoxetine [Cymbalta 60 MG (*)] 60 mg PO HS 08/07/15


 


Lisinopril [Zestril 5 mg (*)] 5 mg PO DAILY@08 08/07/15


 


Aspirin [Aspirin 81mg (*)] 81 mg PO HS 07/26/16


 


Multivitamins [Multivitamin (*)] 1 each PO DAILY@12 07/26/16


 


Pravastatin Sodium [Pravachol] 40 mg PO HS 07/26/16


 


Gabapentin [Neurontin 300 MG (*)] 600 mg PO HS 09/13/18


 


Levothyroxine [Synthroid 150 mcg 150 mcg PO DAILY06 09/13/18





(*)]  


 


Acetaminophen [Tylenol 325mg (*)] 650 mg PO Q4HRS PRN  tab 09/28/18


 


Polyethylene Glycol 3350 [Miralax 17 gm PO BID PRN 10/17/18





17 gm (*)]  


 


Acetaminophen [Tylenol 325mg (*)] 650 mg PO Q4HRS PRN  tab 10/19/18


 


Cephalexin [Keflex (*)] 500 mg PO TID #21 cap 10/19/18


 


Enoxaparin [Lovenox 40 MG (*)] 40 mg SC DAILY  syr 10/19/18


 


predniSONE 20 mg PO DAILY 2 Days #2 tablet 10/20/18














Medical Decision Making





- Diagnostics


EKG Interpretation: 





ECG time 2:05 a.m., sinus rhythm with a rate of 85, old anteroseptal the infarct

,


Imaging Results: 


Chest x-ray unchanged from prior.


ED Course/Re-evaluation: 





82-year-old female presenting with acute COPD exacerbation.  She is well-known 

this hospital.  Discharged 2 days ago.  She is speaking in full sentences.  

Diffuse expiratory wheezing.  Plan will be to treat with a continuous neb, Solu-

Medrol and reassess.





Patient remains improved.  Have discussed with the hospitalist for admission.  

Chest x-ray is unchanged from prior.





0230  patient is having increased work of breathing increasingly somnolent.  

Will trial her on BiPAP.





Patient discussed with Dr. Bolden, hospitalist.  She will admit to her service 

for further care.


Critical Care Time: 





I spent a total of 35 minutes of critical care time in obtaining history, 

performing a physical exam, bedside monitoring of interventions, collecting and 

interpreting tests and discussion with consultants but not including time spent 

performing procedures.





- Data Points


Laboratory Results: 


 Laboratory Results





 10/22/18 01:40 





 10/22/18 01:40 





 











  10/22/18 10/22/18





  01:40 01:40


 


WBC    14.73 10^3/uL H 10^3/uL





    (3.80-9.50) 


 


RBC    4.69 10^6/uL 10^6/uL





    (4.18-5.33) 


 


Hgb    14.4 g/dL g/dL





    (12.6-16.3) 


 


Hct    42.3 % %





    (38.0-47.0) 


 


MCV    90.2 fL fL





    (81.5-99.8) 


 


MCH    30.7 pg pg





    (27.9-34.1) 


 


MCHC    34.0 g/dL g/dL





    (32.4-36.7) 


 


RDW    13.7 % %





    (11.5-15.2) 


 


Plt Count    236 10^3/uL 10^3/uL





    (150-400) 


 


MPV    8.4 fL L fL





    (8.7-11.7) 


 


Neut % (Auto)    Not Reported 





   


 


Lymph % (Auto)    Not Reported 





   


 


Mono % (Auto)    Not Reported 





   


 


Eos % (Auto)    Not Reported 





   


 


Baso % (Auto)    Not Reported 





   


 


Nucleat RBC Rel Count    Not Reported 





   


 


Absolute Neuts (auto)    Not Reported 





   


 


Absolute Lymphs (auto)    Not Reported 





   


 


Absolute Monos (auto)    Not Reported 





   


 


Absolute Eos (auto)    Not Reported 





   


 


Absolute Basos (auto)    Not Reported 





   


 


Absolute Nucleated RBC    Not Reported 





   


 


Immature Gran %    Not Reported 





   


 


Seg Neutrophils %    51.0 % %





   


 


Band Neutrophils %    3.0 % %





   


 


Lymphocytes %    21.0 % %





   


 


Monocytes %    5.0 % %





   


 


Eosinophils %    11.0 % %





   


 


Basophils %    2.0 % %





   


 


Metamyelocytes %    1.0 % %





   


 


Myelocytes %    6.0 % %





   


 


Promyelocytes %    0.0 % %





   


 


Blast Cells %    0.0 % %





   


 


Immature Gran #    Not Reported 





   


 


Absolute Seg Neuts    7.51 10^/uL H 10^/uL





    (1.70-6.50) 


 


Absolute Band Neuts    0.44 10^3/uL 10^3/uL





    (0.00-0.70) 


 


Absolute Lymphocytes    3.09 10^3/uL H 10^3/uL





    (1.00-3.00) 


 


Absolute Monocytes    0.74 10^3/uL 10^3/uL





    (0.30-0.80) 


 


Absolute Eosinophils    1.62 10^3/uL H 10^3/uL





    (0.03-0.40) 


 


Absolute Basophils    0.29 10^3/uL H 10^3/uL





    (0.02-0.10) 


 


Absolute Metamyelocyte    0.15 10^3/mL H 10^3/mL





    (0.00-0.00) 


 


Absolute Myelocytes    0.88 10^3/mL H 10^3/mL





    (0.00-0.00) 


 


Absolute Promyelocytes    0.00 10^3/uL 10^3/uL





    (0.00-0.00) 


 


Absolute Plasma Cells    0.00 10^3/uL 10^3/uL





    (0.00-0.00) 


 


Nucleated RBCs    0 /100 WBC /100 WBC





    (0-0) 


 


Absolute Blast Cells    0.00 10^3/uL 10^3/uL





    (0.00-0.00) 


 


Plasma Cells %    0.0 % %





   


 


Platelet Estimate    ADEQUATE 





    (ADEQ) 


 


Polychromasia    1+  H 





   


 


Sodium  141 mEq/L mEq/L  





   (135-145)  


 


Potassium  3.6 mEq/L mEq/L  





   (3.3-5.0)  


 


Chloride  102 mEq/L mEq/L  





   ()  


 


Carbon Dioxide  29 mEq/l mEq/l  





   (22-31)  


 


Anion Gap  10 mEq/L mEq/L  





   (6-14)  


 


BUN  13 mg/dL mg/dL  





   (7-23)  


 


Creatinine  0.7 mg/dL mg/dL  





   (0.6-1.0)  


 


Estimated GFR  > 60   





   


 


Glucose  143 mg/dL H mg/dL  





   ()  


 


Calcium  9.3 mg/dL mg/dL  





   (8.5-10.4)  











Medications Given: 


 








Discontinued Medications





Albuterol (Proventil Neb)  10 ml IH CONT ONE


   Stop: 10/22/18 01:38


   Last Admin: 10/22/18 01:41 Dose:  10 ml


Magnesium Sulfate (Magnesium Sulf 2 Gm (Premix))  50 mls @ 50 mls/hr IV EDNOW 

ONE


   Stop: 10/22/18 02:44


   Last Admin: 10/22/18 01:45 Dose:  50 mls


Methylprednisolone Sodium Succinate (Solu-Medrol)  125 mg IVP EDNOW ONE


   Stop: 10/22/18 01:38


   Last Admin: 10/22/18 01:41 Dose:  125 mg








Departure





- Departure


Disposition: [facility]


Clinical Impression: 


 Chronic obstructive pulmonary disease with acute exacerbation





Condition: Serious

## 2018-10-22 NOTE — CPEKG
Test Reason : OPEN

Blood Pressure : ***/*** mmHG

Vent. Rate : 085 BPM     Atrial Rate : 085 BPM

   P-R Int : 143 ms          QRS Dur : 105 ms

    QT Int : 386 ms       P-R-T Axes : 065 -41 059 degrees

   QTc Int : 459 ms

 

Sinus rhythm

Left axis deviation

Probable anteroseptal infarct, old

Minimal ST depression, lateral leads

 

Confirmed by Ike Barriga ([id number]) on 10/22/2018 7:21:32 AM

 

Referred By:             Confirmed By:Ike Barriga

## 2018-10-22 NOTE — HOSPPROG
Hospitalist Progress Note


Assessment/Plan: 





82F with melanoma admitted with COPD exacerbation, much improved today





# COPD with acute exacerbation


   - cont nebs and steroids


   - ok for med surg


# melanoma - has not started tx


# LE wound - managed by Dr Velazquez, had a WV which has now been removed


   - cont keflex


Objective: 


 Vital Signs











Temp Pulse Resp BP Pulse Ox


 


 36.6 C   76   20   112/62   98 


 


 10/22/18 03:52  10/22/18 07:36  10/22/18 07:36  10/22/18 07:36  10/22/18 07:36








 Microbiology











 10/22/18 08:30 Respiratory Panel (PCR) - Final





 Nasal, Sinus - Swab 








 











 10/21/18 10/22/18 10/23/18





 05:59 05:59 05:59


 


Intake Total  500 


 


Balance  500 














ICD10 Worksheet


Patient Problems: 


 Problems











Problem Status Onset


 


Weakness Acute  


 


Pneumonia Acute  


 


Bronchospasm Acute  


 


Severe sepsis Acute  


 


Wound infection Acute  


 


Elevated WBC count Acute  


 


Chronic obstructive pulmonary disease with acute exacerbation Acute

## 2018-10-22 NOTE — SOAPPROG
SOAP Progress Note


Assessment/Plan: 


Assessment/Plan: 83yo F s/p WLE of LLE melanoma, admitted with COPD exacerbation

, recently admitted with bactremia. Multiple comorbidities


On OR schedule for [facility] on Thurs 10/24/18 - may need to delay d/t steroids


Wound vac placed today - change MWF. Periwound breakdown from overlap sponge?


Appreciate hospitalist management of comorbidities


Will dictate official consult note





S: breathing "1000x better today". 


O: laying in bed, comfortable, NAD


No increased WOB


Peripheral edema 2+ B


LLE wound with healthy granulation in base, slightly increased dimensions due 

to new periwound breakdown. No surrounding erythema. Redressed with WV


Objective: 





 Vital Signs











Temp Pulse Resp BP Pulse Ox


 


 36.3 C   85   15   131/74 H  93 


 


 10/22/18 15:47  10/22/18 16:38  10/22/18 15:47  10/22/18 15:47  10/22/18 15:47








 Microbiology











 10/22/18 08:30 Respiratory Panel (PCR) - Final





 Nasal, Sinus - Swab 








 











 10/21/18 10/22/18 10/23/18





 05:59 05:59 05:59


 


Intake Total  500 600


 


Balance  500 600














ICD10 Worksheet


Patient Problems: 


 Problems











Problem Status Onset


 


Chronic obstructive pulmonary disease with acute exacerbation Acute  


 


Bronchospasm Acute  


 


Elevated WBC count Acute  


 


Pneumonia Acute  


 


Severe sepsis Acute  


 


Weakness Acute  


 


Wound infection Acute

## 2018-10-22 NOTE — GCON
INTENSIVIST CONSULTATION



REASON FOR ADMISSION:  Acute hypercarbic respiratory failure, COPD.



HISTORY OF PRESENT ILLNESS:  The patient is a pleasant 82-year-old white female with extensive past m
edical history, including severe chronic obstructive pulmonary disease, diastolic dysfunction, histor
y of melanoma, hyperlipidemia, hypertension, hypothyroidism, atrial fibrillation, coronary artery dis
ease, macular degeneration.  She was admitted on 10/22/2018, with complaints of breathlessness.  This
 came on suddenly at [facility].  She became acutely short of breath.  This was associat
ed with cough and wheeze.  She was brought to the emergency room, was placed on BiPAP, and subsequent
ly admitted to the intensive care unit.  She has improved dramatically since this time and is current
ly off BiPAP and breathing easily.  She denies any current cough or production of sputum.  There is n
o chest pain, pleuritic-type chest pain, or angina equivalent.  There is no fever or night sweats.



REVIEW OF SYSTEMS:  10-point review of systems is performed and negative, except for what is listed i
n HPI.



PAST MEDICAL HISTORY:  As above.



PAST SURGICAL HISTORY:  Cardiac catheterization and stents placed, total knee replacement bilaterally
, and melanoma resection.



FAMILY HISTORY:  Significant for coronary artery disease.



MEDICATIONS:  At home, include prednisone, Cymbalta, DuoNeb, Keflex, lidocaine cream, amitriptyline, 
prednisone taper, and Zofran.



SOCIAL HISTORY:  Patient resides at [facility].  She is , has good family support.  She has an
 extensive smoking history, quit 20 years ago.  She denies any alcohol use.



PHYSICAL EXAM:  VITAL SIGNS:  Blood pressure is 145/72, pulse 75, respirations 20, temperature 37.1, 
oxygen saturation 96% on 4 L.  GENERAL:  She is a moderately overweight elderly white female who is r
esting comfortably on supplemental oxygen.  HEENT:  Eyes are PERRLA, EOMI.  Throat shows no erythema 
or tonsillar hypertrophy.  NECK:  Supple.  There is no cervical adenopathy.  HEART:  Regular rate and
 rhythm with a 2/6 systolic murmur at left sternal border without radiation.  LUNGS:  Diminished johnnie
th sounds and significant prolongation of expiratory phase, but there is no wheeze.  ABDOMEN:  Soft, 
nontender.  Bowel sounds are present in all 4 quadrants.  EXTREMITIES:  No clubbing, cyanosis, or steffany
ma.



LABORATORY DATA:  White count 14.7, hemoglobin 14, hematocrit 42, platelet count 236.  Sodium 141, po
tassium 3.6, chloride 102, CO2 is 29, BUN 13, creatinine 0.7, glucose is 143.



IMPRESSION:  

1.  Chronic obstructive pulmonary disease with acute exacerbation.

2.  Acute respiratory failure, secondary to above.

3.  History of diastolic dysfunction.

4.  History of coronary artery disease.

5.  Atrial fibrillation.

6.  Hypertension.

7.  Hypothyroidism.



RECOMMENDATIONS:  

1.  Agree with current steroids.

2.  Frequent nebulizers with both albuterol and Atrovent.

3.  Will discontinue BiPAP for now.

4.  Out of bed to chair.

5.  DVT and PE prophylaxis.  

6.  Stress ulcer prophylaxis.

7.  Continue the majority of her home medications.





Job #:  [id number]

## 2018-10-22 NOTE — GHP
DATE OF ADMISSION:  10/22/2018



Patient is quite fatigued and is able to provide very minimal history at this 
time.  EMR was reviewed and case discussed with ED provider.



CHIEF COMPLAINT:  Shortness of breath, cough.



HISTORY OF PRESENT ILLNESS:  This is a very pleasant 82-year-old female with 
past medical history significant for COPD, diastolic dysfunction, melanoma with 
recent resection, left lower leg, complicated by cellulitis and currently 
undergoing treatment with wound VAC, HLD, HTN, hypothyroidism, atrial 
fibrillation, CAD status post stenting, osteoporosis, macular degeneration, and 
peripheral neuropathy; who presents to the emergency department today via EMS 
from [facility] after patient awoke with sudden onset of shortness of breath, 
cough, and wheezing.  EMS was called and despite nebulizer treatment did not 
have any improvement in her status.  She was subsequently placed on CPAP and 
brought to the emergency department for further evaluation.  The patient denies 
any fevers or chills.  She reports that her shortness of breath is currently 
improved after multiple treatments in the ED with steroids, albuterol, 
magnesium.  The patient appeared to be improving in the emergency department, 
subsequently was placed back on BiPAP, and admitted to the SDU for closer 
respiratory monitoring.  Patient had been discharged with prophylactic Lovenox 
and oral antibiotic therapy.  There is an anticipated plan of the patient 
returning for surgical assessment for possible closure.  The patient denies any 
chest pain.  Again, no reported fevers or chills.



REVIEW OF SYSTEMS:  Limited secondary to patient's somnolence, except as noted 
above.



HOME MEDICATIONS:  As per accompanying med list from [facility].  Lovenox subcu 
daily.  Lidocaine cream to both feet at nighttime.  Keflex 500 mg p.o.  three 
times daily.  DuoNeb three times daily.  Cymbalta 60 mg at h.s. for neuropathy.
  Amitriptyline 10 mg at h.s.  Prednisone taper should be currently at 20 mg, 
today being the last day.  Zofran 4 mg tab p.o. q.6 hours p.r.n. nausea and 
vomiting.



PAST MEDICAL HISTORY:  COPD, diastolic dysfunction with normal EF 75, lower 
left leg wound with cellulitis currently with a wound VAC, peripheral neuropathy
, melanoma status post resection as noted above, hyperlipidemia, hypertension, 
hypothyroidism, atrial fibrillation, CAD status post stent within the LAD, 
osteoporosis with history of L1 compression fracture, macular degeneration, and 
history of metabolic encephalopathy on previous hospitalizations in September 2018.



PAST SURGICAL HISTORY:  Significant for a cath and stent, total knee 
arthroplasty bilaterally, melanoma resection complication with cellulitis and 
wound as noted above.



FAMILY HISTORY:  CAD in mother and CABG age 55.



SOCIAL HISTORY:  Patient resides in [facility].  She is , lives in 
assisted living.  She quit smoking remotely.  Rare alcohol intake and no 
illicit drug use.



CODE STATUS:  As per her MOLST form accompanying the patient shows full.



PHYSICAL EXAM:  VITAL SIGNS:  Upon arrival to the emergency department:  Blood 
pressure 155/85, heart rate 83, respiratory rate 24, O2 saturation 90% on room 
air, temperature 36.6.  Repeat vitals currently:  Blood pressure 145/87, heart 
rate 66, respiratory rate 17, O2 saturation 95% on BiPAP, temperature 36.6.  
GENERAL:  No acute distress.  Patient has been transitioned off BiPAP since 
arrival from the ER to nasal cannula.  She is resting quietly in bed.  She does 
have notable tachypnea and increased work of breathing while lying in bed.  She 
does not appear to be an in any acute distress, however, she is resting 
comfortably.  HEAD:  Normocephalic, atraumatic.  EYES:  Extraocular muscles are 
grossly intact.  Pupils equal, round, decreased reactive to light bilaterally, 
but symmetric.  No scleral icterus or conjunctival injection.  ENT:  Mucous 
membranes are quite dry.  Dentition is absent, patient has denture.  No nasal 
discharge.  NECK:  Supple, trachea midline.  CV:  Regular rate and rhythm.  
Limited cardiac exam due to body habitus but no apparent murmurs, rubs, or 
gallops. RESPIRATORY:  Quite diminished diffusely, no wheezes to auscultation.  
Patient with increased work of breathing, tachypnea, but she is able to say a 
few word sentences.  She is quite fatigued.  Lung fields quite diminished in 
the bases bilaterally.  No wheezes, rales, or rhonchi otherwise.  ABDOMEN:  
Obese, soft, nontender to palpation.  No rebound, guarding, or masses.  
Hypoactive bowel sounds present.  :  No suprapubic tenderness to palpation.  
No catheter in place.  MUSCULOSKELETAL:  Patient with a wound VAC in place on 
the left mid lower leg.  There is some surrounding erythema, but no induration 
or exudates.  She is able to move her distal extremities.  Overall her strength 
is quite diminished.  The patient has generalized weakness and deconditioning.  
NEURO:  Sensation intact, moves distal extremities as noted above.  Patient 
oriented to person and place.  Appears quite fatigued limiting exam.  PSYCH:  
Affect is flat, but patient is cooperative, pleasant.



IMAGING:  Chest x-ray:  Image reviewed, report is still pending this morning 
showing some cardiomegaly with pulmonary vascular prominence.  There is 
slightly limited imaging.  There is possible effusion in the right lower lobe.  
Cardiomegaly.  No pneumothorax.  Compared to a chest x-ray from 10/19/2018 
shows a little bit more vascular prominence at this time compared previously.  



EKG reviewed by myself showing normal sinus rhythm in the 80s.  Left axis 
deviation present.  Q-waves in the anteroseptal leads.  Report noting minimal 
ST depressions in the lateral leads that is very insignificant, less than 1 mm.
  QTc is 457.  Compared to EKG from previous hospital stays is quite similar 
including the very negligible ST depressions in the lateral leads.



ASSESSMENT AND PLAN:  This is a pleasant 82-year-old female with a recent 
hospitalization for a complicated wound infection/cellulitis related to 
melanoma resection, who presents to the emergency department today from her 
assisted living facility with sudden onset of shortness of breath, cough, and 
wheezing.

1.  Acute exacerbation of chronic obstructive pulmonary disease.  Currently 
patient is improving.  She does continue to have quite diminished lung fields, 
but currently saturating well with a nasal cannula.  She has been taken off 
BiPAP, but will leave this available p.r.n.  Will obtain an ABG.  The patient 
has been given steroids, nebulizer treatments, and magnesium in the emergency 
department.  Will continue patient's nebulizer treatments and steroids which 
will need to escalate since patient is completing a taper, incentive spirometry
, and flutter valve.  Will also obtain ABG given patient's recurrent need for 
placement on BiPAP.

2.  Diastolic dysfunction on previous EKG from September 2018.  The patient's 
EF was acceptable, but patient did show some evidence of diastolic dysfunction.
  Chest x-ray currently does reveal some pulmonary vasculature, but no evidence 
of effusion.  We will hold off on antibiotic therapy at this time except for 
patient's Keflex to treat her cellulitis.

3. left leg wound - continue wound vac, continue antibiotics, wound care 
consult. courtesy call to Dr. Velazquez in AM as per day team. 

4.  Benign essential hypertension.  Blood pressure is acceptable at this time.  

5.  Hyperlipidemia.  Patient on current treatment. 

6.  Hypothyroidism.  Resume levothyroxine.  The patient's diet is advanced. 

7.  History atrial fibrillation, currently in normal sinus rhythm.  Monitor on 
telemetry.

8.  Coronary artery disease status post stent.  Resume patient's home 
medications once viable.

9.  Osteoporosis.

10.  Macular degeneration.

11.  History of metabolic encephalopathy 09/2018.  Will need to monitor patient 
closely.

12.  Leukocytosis.  White count 14.73 thousand.  No left shift. 

13.  Hyperglycemia, likely related to patient's glucose intake recently.  Will 
continue to monitor.  If it continues to rise we will need to reconsider 
placement on a sliding scale.  

14.  Fluid, electrolytes, and nutrition.  Patient currently quite somnolent.  
Will hold off on advancing diet until her mentation does improve.  Will monitor 
for potential for aspiration.  Advance diet as tolerated to regular.

15.  COR status is full as per her MOLST form.  

16.  Prophylaxis:  SCDs and continue patient's Lovenox which she has been on 
since her discharge here from the hospital.

17.  Disposition:  Patient is currently admitted to observation status on SDU, 
but she was quickly transitioned back off the BiPAP.  We will continue to 
monitor and have it available p.r.n. should she require it.  She is diffusely 
diminished, but will reassess how she does later this morning given her chronic 
medical issues and her frailty, however, it may be indicated that she be 
admitted for inpatient status and continued close care.





Job #:  [id number]

MTDD

## 2018-10-23 LAB — PLATELET # BLD: 273 10^3/UL (ref 150–400)

## 2018-10-23 RX ADMIN — BUDESONIDE SCH MG: 0.5 INHALANT RESPIRATORY (INHALATION) at 20:10

## 2018-10-23 RX ADMIN — CEPHALEXIN SCH MG: 500 CAPSULE ORAL at 08:56

## 2018-10-23 RX ADMIN — ASPIRIN 81 MG SCH MG: 81 TABLET ORAL at 21:19

## 2018-10-23 RX ADMIN — VITAMIN D, TAB 1000IU (100/BT) SCH UNITS: 25 TAB at 08:56

## 2018-10-23 RX ADMIN — PRAVASTATIN SODIUM SCH MG: 40 TABLET ORAL at 21:21

## 2018-10-23 RX ADMIN — THERA TABS SCH EACH: TAB at 13:09

## 2018-10-23 RX ADMIN — BUDESONIDE SCH: 0.5 INHALANT RESPIRATORY (INHALATION) at 11:53

## 2018-10-23 RX ADMIN — IPRATROPIUM BROMIDE AND ALBUTEROL SULFATE SCH ML: .5; 3 SOLUTION RESPIRATORY (INHALATION) at 16:06

## 2018-10-23 RX ADMIN — ENOXAPARIN SODIUM SCH MG: 100 INJECTION SUBCUTANEOUS at 08:56

## 2018-10-23 RX ADMIN — GABAPENTIN SCH MG: 300 CAPSULE ORAL at 21:19

## 2018-10-23 RX ADMIN — IPRATROPIUM BROMIDE AND ALBUTEROL SULFATE SCH ML: .5; 3 SOLUTION RESPIRATORY (INHALATION) at 11:35

## 2018-10-23 RX ADMIN — CEPHALEXIN SCH MG: 500 CAPSULE ORAL at 21:21

## 2018-10-23 RX ADMIN — METOPROLOL TARTRATE SCH MG: 25 TABLET, FILM COATED ORAL at 08:56

## 2018-10-23 RX ADMIN — ACETAMINOPHEN PRN MG: 325 TABLET ORAL at 23:48

## 2018-10-23 RX ADMIN — IPRATROPIUM BROMIDE AND ALBUTEROL SULFATE SCH ML: .5; 3 SOLUTION RESPIRATORY (INHALATION) at 05:20

## 2018-10-23 RX ADMIN — METOPROLOL TARTRATE SCH MG: 25 TABLET, FILM COATED ORAL at 21:21

## 2018-10-23 RX ADMIN — LEVOTHYROXINE SODIUM SCH MCG: 150 TABLET ORAL at 06:45

## 2018-10-23 RX ADMIN — IPRATROPIUM BROMIDE AND ALBUTEROL SULFATE SCH ML: .5; 3 SOLUTION RESPIRATORY (INHALATION) at 20:10

## 2018-10-23 RX ADMIN — CEPHALEXIN SCH MG: 500 CAPSULE ORAL at 15:16

## 2018-10-23 NOTE — SOAPPROG
SOAP Progress Note


Assessment/Plan: 


Assessment/plan:





* COPD with acute exacerbation-patient back at baseline


      -continue frequent nebulized treatments


* Acute respiratory failure-resolved.  Agree with discontinuation of BiPAP


* Coronary artery disease


* Hypothyroidism


* Hypertension 


* Atrial fibrillation


* Disposition-home soon

















Subjective: 





Sitting up in chair.  Resting comfortably.  Denies any significant 

breathlessness.  Feels like she is back to her baseline.  Wishes to go home.


Objective: 





 Vital Signs











Temp Pulse Resp BP Pulse Ox


 


 36.6 C   64   20   116/64   92 


 


 10/23/18 12:00  10/23/18 12:00  10/23/18 12:00  10/23/18 12:00  10/23/18 12:00








 Microbiology











 10/22/18 08:30 Respiratory Panel (PCR) - Final





 Nasal, Sinus - Swab 








 Laboratory Results





 10/23/18 06:05 





 











 10/22/18 10/23/18 10/24/18





 05:59 05:59 05:59


 


Intake Total 500 1080 


 


Output Total  1 


 


Balance 500 1079 














- Time Spent With Patient


Time Spent With Patient: 





25 min of time spent with patient, over 1/2 involved coordination of care or 

counseling.


Case discussed with nursing





Physical Exam





- Physical Exam


General Appearance: WD/WN, alert


EENT: PERRL/EOMI


Neck: non-tender


Respiratory: prolonged expiration, No respiratory distress, No wheezing


Cardiac/Chest: systolic murmur, irregularly irregular


Abdomen: normal bowel sounds, non-tender, soft


Pelvic Exam: deferred


Rectal: deferred


Skin: normal color, warm/dry


Extremities: normal range of motion, non-tender, normal inspection, normal 

capillary refill


Neuro/Psych: alert





ICD10 Worksheet


Patient Problems: 


 Problems











Problem Status Onset


 


Chronic obstructive pulmonary disease with acute exacerbation Acute  


 


Bronchospasm Acute  


 


Elevated WBC count Acute  


 


Pneumonia Acute  


 


Severe sepsis Acute  


 


Weakness Acute  


 


Wound infection Acute

## 2018-10-23 NOTE — HOSPPROG
Hospitalist Progress Note


Assessment/Plan: 


Assessment: 83 yo F p/w acute COPD exacerbation c/b acute SVT





Plan:





# COPD exacerbation. Acute, new problem to this provider, further w/u 

indicated. No o2 requirement at baseline, currently requiring 1LPM, ongoing 

poor exp air movement, CXR w/o focal air space disease (personally interpreted)


-cont on pred, adjust nebs to PRN today


-cont on keflex


-wean o2


-checking RVP for precipitant


-check dimer





# SVT. Acute, symptomatic 20 beat run, self-limited


-none since initiating bblocker


-reduce metroprolol to 12.5mg bid given bradycardia


-echo w/ dd





# Paroxysmal Afib. Added bblocker as above, cont ASA for CVA ppx





# Chronic LE wound. Currently on keflex tid, continue


-d/w Dr. Gramajo, he reports that Dr. Velazquez will reschedule skin graft for one 

week from 10/25 to allow for COPD recovery and discontinuation of steroids


-plan for ongoing wound vac MWF until surg





# CAD. Chronic, trop neg x 3, cont ASA/statin, holding ACEi given initiation of 

bblocker, restart if BP tolerates





# Melanoma. Yet to begin tx





Diet. Regular


PPx. High risk, lovenox 40


Code. Full


Dispo. ADD 10/24, pending stablization of pulm status 





Subjective: lightheadedness w/ 20 beat run SVT


Objective: 


 Vital Signs











Temp Pulse Resp BP Pulse Ox


 


 36.5 C   70   15   135/65 H  94 


 


 10/23/18 21:31  10/23/18 21:31  10/23/18 21:31  10/23/18 21:31  10/23/18 21:31








 Laboratory Results





 10/23/18 06:05 





 











 10/22/18 10/23/18 10/24/18





 05:59 05:59 05:59


 


Intake Total 500 1080 720


 


Output Total  1 351


 


Balance 500 1079 369














- Physical Exam


Constitutional: no apparent distress, not in pain, chronically ill appearing, 

No uncomfortable


Cardiovascular: regular rate and rhythym, no murmur, rub, or gallop, edema (1+ 

LLE)


Respiratory: reduced air movement (on insp and expiration bilat), No expiratory 

wheeze, No bronchial breath sounds, No respiratory distress


Gastrointestinal: normoactive bowel sounds, soft, non-tender abdomen, no 

palpable masses, No distension


Skin: other (healing margins of wound), No mottled, No erythema, No induration


Neurologic: AAOx3, No sensation intact bilaterally (LLE paresthesias distal to 

knee), No weakness (motor 5/5 bilat LE)


Psychiatric: interacting appropriately, not anxious, not encephalopathic, 

thought process linear





ICD10 Worksheet


Patient Problems: 


 Problems











Problem Status Onset


 


Chronic obstructive pulmonary disease with acute exacerbation Acute  


 


Bronchospasm Acute  


 


Elevated WBC count Acute  


 


Pneumonia Acute  


 


Severe sepsis Acute  


 


Weakness Acute  


 


Wound infection Acute

## 2018-10-23 NOTE — ASMTCMCOM
CM Note

 

CM Note                       

Notes:

Patient interested in talking with Financial Counselor, lft mess. The plan is for her to return to 

[facility] for rehab.

 

Date Signed:  10/23/2018 03:10 PM

Electronically Signed By:Belinda Castanon LCSW

## 2018-10-23 NOTE — SOAPPROG
SOAP Progress Note


Assessment/Plan: 


Assessment/Plan: 81yo F s/p WLE of LLE melanoma, admitted with COPD exacerbation

, recently admitted with bactremia. Multiple comorbidities


Will delay skin graft surgery x 1 week to allow for recovery of lungs and 

steroid course. Will reschedule in 1 week


Wound vac  - change MWF. Periwound breakdown from overlap sponge?


Appreciate hospitalist management of comorbidities


Seen with Dr. Velazquez 





S: very disappointed that surgery will be delayed, but expresses understanding 


O: sitting upright in chair, comfortable, NAD


No increased WOB


Peripheral edema 2+ B


LLE wound vac intact to suction


Objective: 





 Vital Signs











Temp Pulse Resp BP Pulse Ox


 


 36.5 C   62   19   137/75 H  95 


 


 10/23/18 08:00  10/23/18 08:56  10/23/18 08:00  10/23/18 08:56  10/23/18 08:00








 Microbiology











 10/22/18 08:30 Respiratory Panel (PCR) - Final





 Nasal, Sinus - Swab 








 Laboratory Results





 10/23/18 06:05 





 











 10/22/18 10/23/18 10/24/18





 05:59 05:59 05:59


 


Intake Total 500 1080 


 


Output Total  1 


 


Balance 500 1079 














ICD10 Worksheet


Patient Problems: 


 Problems











Problem Status Onset


 


Chronic obstructive pulmonary disease with acute exacerbation Acute  


 


Bronchospasm Acute  


 


Elevated WBC count Acute  


 


Pneumonia Acute  


 


Severe sepsis Acute  


 


Weakness Acute  


 


Wound infection Acute

## 2018-10-24 RX ADMIN — CEPHALEXIN SCH MG: 500 CAPSULE ORAL at 08:49

## 2018-10-24 RX ADMIN — THERA TABS SCH EACH: TAB at 11:59

## 2018-10-24 RX ADMIN — PRAVASTATIN SODIUM SCH MG: 40 TABLET ORAL at 21:52

## 2018-10-24 RX ADMIN — METOPROLOL TARTRATE SCH MG: 25 TABLET, FILM COATED ORAL at 08:49

## 2018-10-24 RX ADMIN — CEPHALEXIN SCH MG: 500 CAPSULE ORAL at 21:52

## 2018-10-24 RX ADMIN — LEVOTHYROXINE SODIUM SCH MCG: 150 TABLET ORAL at 06:08

## 2018-10-24 RX ADMIN — ASPIRIN 81 MG SCH MG: 81 TABLET ORAL at 21:52

## 2018-10-24 RX ADMIN — IPRATROPIUM BROMIDE AND ALBUTEROL SULFATE SCH ML: .5; 3 SOLUTION RESPIRATORY (INHALATION) at 21:24

## 2018-10-24 RX ADMIN — BUDESONIDE SCH MG: 0.5 INHALANT RESPIRATORY (INHALATION) at 11:09

## 2018-10-24 RX ADMIN — METOPROLOL TARTRATE SCH MG: 25 TABLET, FILM COATED ORAL at 21:52

## 2018-10-24 RX ADMIN — CEPHALEXIN SCH MG: 500 CAPSULE ORAL at 15:30

## 2018-10-24 RX ADMIN — ENOXAPARIN SODIUM SCH MG: 100 INJECTION SUBCUTANEOUS at 08:49

## 2018-10-24 RX ADMIN — ACETAMINOPHEN PRN MG: 325 TABLET ORAL at 22:07

## 2018-10-24 RX ADMIN — GABAPENTIN SCH MG: 300 CAPSULE ORAL at 21:52

## 2018-10-24 RX ADMIN — BUDESONIDE SCH MG: 0.5 INHALANT RESPIRATORY (INHALATION) at 21:24

## 2018-10-24 RX ADMIN — IPRATROPIUM BROMIDE AND ALBUTEROL SULFATE SCH ML: .5; 3 SOLUTION RESPIRATORY (INHALATION) at 17:02

## 2018-10-24 RX ADMIN — VITAMIN D, TAB 1000IU (100/BT) SCH UNITS: 25 TAB at 08:49

## 2018-10-24 NOTE — PDINTPN
Intensivist Progress Note


Assessment/Plan: 


Assessment/plan:





* COPD with acute exacerbation-patient back at baseline


      -continue frequent nebulized treatments


* Acute respiratory failure-resolved.  


* Melanoma


* Coronary artery disease


* Hypothyroidism


* Hypertension 


* Atrial fibrillation


* Disposition-home soon














Subjective: 





Resting comfortably eating breakfast.  Denies any breathlessness.  Denies any 

cough or production of sputum.


Objective: 





 Vital Signs











Temp Pulse Resp BP Pulse Ox


 


 36.6 C   55 L  20   140/74 H  96 


 


 10/24/18 07:44  10/24/18 08:49  10/24/18 07:44  10/24/18 08:49  10/24/18 07:44








 Laboratory Results





 10/23/18 06:05 





 











 10/23/18 10/24/18 10/25/18





 05:59 05:59 05:59


 


Intake Total 1080 1170 


 


Output Total 1 751 


 


Balance 1079 419 














- Time Spent With Patient


Time Spent With Patient: 


25 min of time spent with patient, over 1/2 involved with coordination of care 

or counseling.





Physical Exam





- Physical Exam


General Appearance: alert, no apparent distress


EENT: PERRL/EOMI


Neck: non-tender, full range of motion, supple, normal inspection


Respiratory: prolonged expiration, No respiratory distress, No wheezing


Cardiac/Chest: normal peripheral pulses, regular rate, rhythm


Peripheral Pulses: 2+: carotid (R), carotid (L), femoral (R), femoral (L), 

dorsalis-pedis (R), dorsalis-pedis (L)


Abdomen: normal bowel sounds, non-tender, soft


Pelvic Exam: deferred


Rectal: deferred


Skin: normal color, warm/dry


Extremities: normal range of motion, non-tender, normal inspection, normal 

capillary refill


Neuro/Psych: alert





ICD10 Worksheet


Patient Problems: 


 Problems











Problem Status Onset


 


Chronic obstructive pulmonary disease with acute exacerbation Acute  


 


Bronchospasm Acute  


 


Elevated WBC count Acute  


 


Pneumonia Acute  


 


Severe sepsis Acute  


 


Weakness Acute  


 


Wound infection Acute

## 2018-10-24 NOTE — PDMN
Medical Necessity


Medical necessity: Change to inpt as of 10/24/18 @ 00:20 as pt meets meets inpt 

criteria per MD order and MCG M-100, Chronic Obstructive Pulmonary Disease. 82 y

/o admitted w/COPD exacerbation upgraded to inpt status for unresolved COPD 

exac w/ongoing poor air movement, need for nebulizer treatments, need for 

supplemental O2 and O2 monitoring, also w/paroxysmal afib, 20 beat run SVT, pt 

symptomatic w/light headedness requiring further monitoring, anticipated LOS>48 

hrs.

## 2018-10-24 NOTE — SOAPPROG
SOAP Progress Note


Assessment/Plan: 


Assessment/Plan: 81yo F s/p WLE of LLE melanoma, admitted with COPD exacerbation

, recently admitted with bactremia. Multiple comorbidities


Will delay skin graft surgery x 1 week to allow for recovery of lungs and 

steroid course. Will reschedule in 1 week


Wound vac  - change MWF


Appreciate hospitalist management of comorbidities





S: feeling well, no complaints this am


O: laying in bed, comfortable, NAD


No increased WOB


Peripheral edema 2+ B


LLE wound vac intact to suction








Objective: 





 Vital Signs











Temp Pulse Resp BP Pulse Ox


 


 36.7 C   59 L  21 H  140/82 H  92 


 


 10/24/18 12:00  10/24/18 12:00  10/24/18 12:00  10/24/18 12:00  10/24/18 12:00








 











 10/23/18 10/24/18 10/25/18





 05:59 05:59 05:59


 


Intake Total  450 


 


Output Total  400 


 


Balance  50 














ICD10 Worksheet


Patient Problems: 


 Problems











Problem Status Onset


 


Chronic obstructive pulmonary disease with acute exacerbation Acute  


 


Bronchospasm Acute  


 


Elevated WBC count Acute  


 


Pneumonia Acute  


 


Severe sepsis Acute  


 


Weakness Acute  


 


Wound infection Acute

## 2018-10-24 NOTE — WOCRNPDOC
WOCRN Advanced Assessment Note





- Skin Integrity Problem, Advanced Assess


  ** Left Lower Lateral Leg Surgical Wound/Incision


Dressing Type: Black Vac Foam (x1), Wound Vac


Dressing Description: Intact, Saturated


Exudate Amount: Minimal


Exudate Color: Yellow


Exudate Characteristic(s): Thick


Integumentary Issue Intervention: Dressing Changed, Mechanical Debridement (

with gauze to remove layer of thick drainage that was  built up. )


Adolph Wound Tissue: Macerated, Denuded (to 1 cm adolph wound due to vac foam 

blockage)


Wound Bed Constitution: Granulation Tissue (100%)


Site Measurement - Head-to-Toe Length X Width X Depth (cm): 10.4x7.4x0.6


Skin Integrity Problem Comment: x1 piece of black foam to wound bed after skin 

prep and draping adolph wound. Vac restarted at -125 mm Hg continuous suction 

with no leaks. Tanya CHAVEZ in room for care. Next vac change due Friday 10-26.

## 2018-10-25 VITALS — DIASTOLIC BLOOD PRESSURE: 80 MMHG | SYSTOLIC BLOOD PRESSURE: 140 MMHG

## 2018-10-25 RX ADMIN — THERA TABS SCH EACH: TAB at 12:13

## 2018-10-25 RX ADMIN — CEPHALEXIN SCH MG: 500 CAPSULE ORAL at 15:58

## 2018-10-25 RX ADMIN — ENOXAPARIN SODIUM SCH MG: 100 INJECTION SUBCUTANEOUS at 09:14

## 2018-10-25 RX ADMIN — CEPHALEXIN SCH MG: 500 CAPSULE ORAL at 09:12

## 2018-10-25 RX ADMIN — LEVOTHYROXINE SODIUM SCH MCG: 150 TABLET ORAL at 06:14

## 2018-10-25 RX ADMIN — IPRATROPIUM BROMIDE AND ALBUTEROL SULFATE SCH: .5; 3 SOLUTION RESPIRATORY (INHALATION) at 05:38

## 2018-10-25 RX ADMIN — METOPROLOL TARTRATE SCH MG: 25 TABLET, FILM COATED ORAL at 09:13

## 2018-10-25 RX ADMIN — BUDESONIDE SCH MG: 0.5 INHALANT RESPIRATORY (INHALATION) at 11:44

## 2018-10-25 RX ADMIN — IPRATROPIUM BROMIDE AND ALBUTEROL SULFATE SCH ML: .5; 3 SOLUTION RESPIRATORY (INHALATION) at 11:44

## 2018-10-25 RX ADMIN — METOPROLOL TARTRATE SCH: 25 TABLET, FILM COATED ORAL at 09:37

## 2018-10-25 RX ADMIN — VITAMIN D, TAB 1000IU (100/BT) SCH UNITS: 25 TAB at 09:14

## 2018-10-25 NOTE — ASMTDCNOTE
Case Management Discharge

 

Discharge Order Complete?     Answers:  Yes                                   

Patient to Obtain             Answers:  Other                         Notes:  [facility]

Medications                                                                   

Transportation Arranged       Answers:  Other                         Notes:  [address] 

                                                                              Transportation

Transport will Pick (Date     10/25/2018 04:00 PM

& Time)                       

EMTALA Complete               Answers:  No                                    

Case Management Transport     Answers:  Yes                                   

Form Complete                                                                 

Faxed Final Orders            Answers:  Yes                                   

Agency/Facility Transfer      Answers:  Yes                                   

Report Printed & Faxed to                                                     

Receiving Agency                                                              

Family Notified               Answers:  Yes                                   

Discharge Comments            

Notes:

Pts case discussed w/ Dr. Cintron and SCOTT Sabillon. Pt is being discharged today back to [facility]. Fatmata from [facility] set up transportation w/ [facility] [address]. DC orders sent. SCOTT Sabillon will 

call [facility] to give report. CM available for changes.







Plan: [facility]

 

Date Signed:  10/25/2018 01:35 PM

Electronically Signed By:LETI Bryan

## 2018-10-25 NOTE — PDIAF
- Diagnosis


Diagnosis: COPD exacerbation, LLE leg wound


Code Status: Full Code





- Medication Management


Discharge Medications: 


 Medications to Continue on Transfer





Amitriptyline HCl [Elavil 10 mg (*)] 10 mg PO HS 08/07/15 [Last Taken 10/21/18]


Cholecalciferol Vit D3 [Vitamin D3 (*)] 2,000 units PO DAILY 08/07/15 [Last 

Taken 10/17/18]


DULoxetine [Cymbalta 60 MG (*)] 60 mg PO HS 08/07/15 [Last Taken 10/21/18]


Lisinopril [Zestril 5 mg (*)] 5 mg PO DAILY@08 08/07/15 [Last Taken 10/21/18]


Aspirin [Aspirin 81mg (*)] 81 mg PO HS 07/26/16 [Last Taken 10/21/18]


Multivitamins [Multivitamin (*)] 1 each PO DAILY@12 07/26/16 [Last Taken 10/17/

18]


Pravastatin Sodium [Pravachol] 40 mg PO HS 07/26/16 [Last Taken 10/21/18]


Gabapentin [Neurontin 300 MG (*)] 600 mg PO HS 09/13/18 [Last Taken 10/21/18]


Levothyroxine [Synthroid 150 mcg (*)] 150 mcg PO DAILY06 09/13/18 [Last Taken 10

/21/18]


Polyethylene Glycol 3350 [Miralax 17 gm (*)] 17 gm PO BID PRN 10/17/18 [Last 

Taken Unknown]


Acetaminophen [Tylenol 325mg (*)] 650 mg PO Q4HRS PRN  tab 10/19/18 [Last Taken 

Unknown]


Cephalexin [Keflex (*)] 500 mg PO TID #21 cap 10/19/18 [Last Taken 10/21/18 21:

00]


Enoxaparin [Lovenox 40 MG (*)] 40 mg SC DAILY  syr 10/19/18 [Last Taken 10/21/18

]


Bacitracin Ointment 1 angela TP BID #1 pkt 10/25/18 [Last Taken Unknown]


Budesonide [Budesonide 0.5MG/2Ml Neb (*)] 0.5 mg IH BID  ampul.neb 10/25/18 [

Last Taken Unknown]


Ipratropium/Albuterol [Duoneb (*)] 3 ml IH QID PRN  deyvial 10/25/18 [Last 

Taken Unknown]


Lidocaine 4% [Anecream 4% Cream (*)] 1 angela TP QID PRN  cream 10/25/18 [Last 

Taken Unknown]


predniSONE 40 mg PO DAILY #4 tablet 10/25/18 [Last Taken Unknown]








Additional Medication Instructions: Predisone 40mg PO for two subsequent days; 

hold Aspirin after 10/29 dosage and restart on 11/2; hold lovenox on 11/1, 

restart on 11/2


Discharge Medications: Refer to the Discharge Home Medication list for PRN 

reason.


PICC Care - Routine: N/A





- Orders


Services needed: Registered Nurse, Certified Nursing Aide, Master 

, Physical Therapy, Occupational Therapy


Oxygen: 2LPM continuous, wean


Diet Recommendation: cardiac -low fat low salt


Weigh Patient: weekly


Gaspar: Not applicable


Wound Care Instructions: wound vac change MWF





- Labs/Radiology


BMP Date: 10/31/18


CBC w/diff Date: 10/31/18


Other Lab Name, Date and Time: PT/PTT on 10/31/18


Call or Fax Lab and Imaging Results to: Dr. Velazquez





- Follow Up Care


Current Providers and Referrals: 


NONE *PRIMARY CARE P,. [Unknown] - As per Instructions


Shannan Velazquez MD [Medical Doctor] -  (please contact her office for surgery 

scheduling next week (planned for 11/1/18))

## 2018-10-25 NOTE — ASMTLACE
LACE

 

Length of stay for            Answers:  3 days                                

current admission                                                             

Acuity / Level of             Answers:  Yes                                   

Care: Did the patient                                                         

have an inpatient                                                             

admission?                                                                    

Comorbidities - select        Answers:  Chronic pulmonary disease             

all that apply                                                                

                                        Coronary Artery Disease               

                                        Other                         Notes:  HTN; Peripheral 

                                                                              neuropathy; Melanoma 

                                                                              resection with 

                                                                              complications of 

                                                                              cellulitis

# of Emergency department     Answers:  3-4                                   

visits in the last 6                                                          

months                                                                        

Score: 14

 

Date Signed:  10/25/2018 01:32 PM

Electronically Signed By:LETI Bryan

## 2018-10-25 NOTE — PDDCSUM
Discharge Summary


Discharge Summary: 


DISCHARGE SUMMARY





FOLLOW-UP ITEMS: 


Preop labs next Wednesday to Dr. Velazquez





DATE OF ADMISSION:  10/22/2018


DATE OF DISCHARGE:  10/25/2018





DISCHARGE DIAGNOSES:


1.  Acute COPD exacerbation


2. Acute supraventricular tachycardia


3. Paroxysmal atrial fibrillation


4. Chronic lower extremity wound


5. Chronic coronary artery disease


6. Recent diagnosis melanoma


7. Chin pustule 





CONSULTATIONS:


General surgery





PROCEDURES / IMAGING:


Wound VAC change





CHIEF COMPLAINT:


Acute shortness of breath





SUBJECTIVE:


Patient is feeling well at time discharge, she no longer has any shortness of 

breath, her pain is well controlled, she has a small pustule on her chin





PHYSICAL EXAM ON DISCHARGE:


Systolic blood pressure 120-150, heart rate 50, satting well on 1 L nasal 

cannula, afebrile, alert awake oriented x3, no expiratory wheezes or bronchial 

breath sounds, good inspiratory air movement comma heart rhythm is regular, 

mildly bradycardic, left lower extremity has trace edema with good granulation 

tissue at the wound margins





HOSPITAL COURSE BY PROBLEM:


Patient presented with acute COPD exacerbation requiring IV steroids, scheduled 

nebulizers, and she had no evidence of focal airspace disease on chest imaging.

  Her hospitalization was extended secondary to slow clinical response but on 

the day of discharge she had no expiratory wheezes and she was intermittently 

satting well on room air, occasion requiring 1 L nasal cannula.  I suspect that 

she also has an element of atelectasis from immobility, and I have recommended 

incentive spirometer.  The patient had at least 1 episode of supraventricular 

tachycardia which was self limited as well as 1 episode of atrial fibrillation 

with acute rapid ventricular response.  She has a known history of paroxysmal 

atrial fibrillation and she has previously been on systemic anticoagulation.  

Her systemic anticoagulation was recently discontinued because she is scheduled 

for an upcoming lower extremity skin graft surgery.  Consequently, the patient 

has been on aspirin therapy, which shall be held 48 hr prior to her upcoming 

skin graft.  We attempted to initiate metoprolol for js blocking therapy 

given her SVT and paroxysmal AFib, but she did not tolerate this from a heart 

rate standpoint.  Her heart rate was 40-60 even with 12.5 mg of metoprolol, so 

the decision was made to discontinue, as she does not require a beta-blocker 

for her baseline paroxysmal atrial fibrillation management.  I do recommend 

that she follow up with Dr. Martinez following her surgery for possible 30 day 

event monitor to gauge her AFib burden.  The patient was seen in consultation 

by General surgery, who had scheduled a skin graft on 10/25.  Given that the 

patient's pulmonary status was unstable during this hospitalization, the 

decision was made to delay her surgery by 1 week.  The plan is for her to be 

off of steroids for 5 days prior to her surgery, and they will be continued for 

2 subsequent days after discharge, then discontinued.  Of note, the patient is 

also on prophylactic Lovenox for DVT prevention, and this will be held on the 

day prior to the patient's surgery.  Following her surgery, the patient may be 

re-initiated on systemic anticoagulation for CVA prevention.  The patient 

should also have some topical bacitracin for her chin pustule which is most 

likely secondary to friction from initial facemask oxygen.  She has also been 

continued on her Keflex 500 mg 3 times daily preoperatively.  Her melanoma 

treatment will begin after her skin graft surgery.





DISCHARGE MEDICATIONS:


Please see official discharge medication reconciliation sheet in chart , 

continue home medications with planned stop date of aspirin 48 hr prior to 

November 1st, and stop date of Lovenox 24 hr prior to November 1st.  The 

patient will take prednisone 40 mg daily for 2 subsequent days after discharge, 

then it will discontinue.  She will utilize nebulizers as needed.





DISCHARGE INSTRUCTIONS:


Please follow up with Dr. Velazquez's office to determine exact timing of surgery 

on November 1st and follow up with Dr. Martinez thereafter.





TIME SPENT:


Greater than 30 minutes were spent on direct patient care, as well as discharge 

planning and preparation.

## 2018-10-26 NOTE — ASDISCHSUM
----------------------------------------------

Discharge Information

----------------------------------------------

Plan Status:SNF                                      Medically Cleared to Leave:10/25/2018

Discharge Date:10/25/2018 04:18 PM                   CM D/C Disposition:

ADT D/C Disposition:Skilled Nursing Facility         Projected Discharge Date:10/25/2018 11:00 AM

Transportation at D/C:                               Discharge Delay Reason:

Follow-Up Date:10/25/2018 11:00 AM                   Discharge Slot:

Final Diagnosis:COPD Exacerbation, Weakness, PNA, Wound infection

----------------------------------------------

Placement Information

----------------------------------------------

Referral Type:*Nursing Home/SNF                      Referral ID:[id number]

Provider Name:[facility]

Address 1:[address]                             Phone [phone]

Address 2:                                           Fax [phone]

City:[place]                                         Selection Factors:

State:[place]

 

----------------------------------------------

Patient Contact Information

----------------------------------------------

Contact Name:VITOR                           Relationship:

Address:[address] [place] [address]                             Home [phone]

                                                     Work [phone]

City:[place]                                         Alternate Phone:

State/Zip Code:[place] [address]                              Email:

----------------------------------------------

Financial Information

----------------------------------------------

Financial Class:Medicare Advantage Plans

Primary Plan Desc:[employer]         Primary Plan Number:[id number]

Secondary Plan Desc:                                 Secondary Plan Number:

 

 

----------------------------------------------

Assessment Information

----------------------------------------------

----------------------------------------------

LACE

----------------------------------------------

LACE

 

Length of stay for            Answers:  3 days                                

current admission                                                             

Acuity / Level of             Answers:  Yes                                   

Care: Did the patient                                                         

have an inpatient                                                             

admission?                                                                    

Comorbidities - select        Answers:  Chronic pulmonary disease             

all that apply                                                                

                                        Coronary Artery Disease               

                                        Other                         Notes:  HTN; Peripheral 

                                                                              neuropathy; Melanoma 

                                                                              resection with 

                                                                              complications of 

                                                                              cellulitis

# of Emergency department     Answers:  3-4                                   

visits in the last 6                                                          

months                                                                        

Score: 14

 

Date Signed:  10/25/2018 01:32 PM

Electronically Signed By:LETI Bryan

 

 

----------------------------------------------

[facility] CM Progress Note

----------------------------------------------

CM Note

 

CM Note                       

Notes:

81yo female, recent discharge from [facility] due to Cellulitis, admitted for COPD 

exascerbation, Weakness, PNA, Sepsis, Wound infection. She had been at [facility] for Rehab and 

will return there when discharged. CM to follow.

 

Date Signed:  10/22/2018 02:43 PM

Electronically Signed By:Belinda Castanon LCSW

 

 

----------------------------------------------

[facility] CM Progress Note

----------------------------------------------

CM Note

 

CM Note                       

Notes:

Patient interested in talking with Financial Counselor, lft mess. The plan is for her to return to 

[facility] for rehab.

 

Date Signed:  10/23/2018 03:10 PM

Electronically Signed By:Belinda Castanon LCSW

 

 

----------------------------------------------

Case Management Discharge Plan Note

----------------------------------------------

Case Management Discharge

 

Discharge Order Complete?     Answers:  Yes                                   

Patient to Obtain             Answers:  Other                         Notes:  [facility]

Medications                                                                   

Transportation Arranged       Answers:  Other                         Notes:  [employer] 

                                                                              Transportation

Transport will Pick (Date     10/25/2018 04:00 PM

& Time)                       

[facility] Complete               Answers:  No                                    

Case Management Transport     Answers:  Yes                                   

Form Complete                                                                 

Faxed Final Orders            Answers:  Yes                                   

Agency/Facility Transfer      Answers:  Yes                                   

Report Printed & Faxed to                                                     

Receiving Agency                                                              

Family Notified               Answers:  Yes                                   

Discharge Comments            

Notes:

Pts case discussed w/ Dr. Cintron and Ramandeep RN. Pt is being discharged today back to [facility]. Fatmata from [facility] set up transportation w/ [employer]. DC orders sent. SCOTT Sabillon will 

call [facility] to give report. CM available for changes.







Plan: [facility]

 

Date Signed:  10/25/2018 01:35 PM

Electronically Signed By:LETI Bryan

 

 

----------------------------------------------

Intervention Information

----------------------------------------------

## 2018-10-29 NOTE — GHP
DATE OF ADMISSION:  10/25/2018



DATE OF SURGERY:  11/01/2018.



PREOPERATIVE DIAGNOSIS:  Delayed wound healing following wide local excision of left lower extremity 
melanoma.



HISTORY OF PRESENT ILLNESS:  The patient is an 82-year-old woman who originally presented with a maira
noma of her left lower extremity.  She was taken to the operating room by Dr. Shannan Velazquez on 09/13/20
18, for wide local excision and left inguinal sentinel lymph node biopsy.  Pathology showed nodular m
elanoma with a lentigo malignant classified as a T1b N0.  She has had several admissions following th
e original surgery for wound infection and COPD exacerbation.  Most recently, she was admitted last w
Cach[facility] DeHe for a COPD exacerbation requiring a course of steroids.  She has continued with wound VAC therapy
 to the left lower extremity wound and IV antibiotics.  She denies any worsening symptoms, including 
pain to the area, swelling, redness, fevers, or chills.  At this time, she is ready to proceed with s
plit-thickness skin graft.



PAST MEDICAL HISTORY:  Melanoma, COPD, hypertension, coronary artery disease, atrial fibrillation, hy
perlipidemia, osteoporosis, hypothyroidism, macular degeneration and blindness, pulmonary hypertensio
n, and peripheral neuropathy.



PAST SURGICAL HISTORY:  Bilateral knee arthroplasties, wide local excision left lower extremity as pr
eviously mentioned.



ALLERGIES:  Penicillin.



SOCIAL HISTORY:  She is .  Former smoker.  Denies tobacco, alcohol, or recreational drug use. 
 She is currently at [facility], but typically lives at [facility].



FAMILY HISTORY:  Significant for coronary artery disease.



REVIEW OF SYSTEMS:  10-point review of systems negative, aside from the HPI.



PHYSICAL EXAMINATION:  GENERAL:  A pleasant, well-developed, well-nourished woman in no acute distres
s.  HEENT:  Normocephalic, atraumatic.  No hearing deficits.  Pupils equal and round.  She is blind. 
 Mucous membranes moist.  NECK:  Trachea midline.  RESPIRATORY:  Clear to auscultation bilaterally.  
No increased work of breathing.  CARDIOVASCULAR:  Regular rate and rhythm.  No peripheral edema.  SKI
N:  Left lower extremity wound with healthy granulation tissue in the base.  Wound VAC intact.  No murphy
rrounding erythema.  PSYCH:  Mood and affect normal.  NEURO:  Grossly intact, other than peripheral e
amanda.



IMPRESSION/PLAN:  82-year-old woman status post wide local excision of a melanoma of her left lower e
xtremity, now with delayed wound healing.  At this time, she would like to proceed with split-thickne
ss skin graft with wound vacuum-assisted closure placement.  We discussed risks of surgery, including
 but not limited to heart attack, stroke, blood clots, or death.  We discussed risk of infection, ble
eding, graft failure, or continued delayed wound healing.  She understands the risks and would like t
o proceed.  She will stay inpatient after the procedure to receive antibiotics on-call to the operati
ng room.  



Patient was additionally seen by Dr. Velazquez who agrees with the above impression and plan.





Job #:  [id number]

## 2018-11-01 ENCOUNTER — HOSPITAL ENCOUNTER (INPATIENT)
Dept: HOSPITAL 80 - FSGY | Age: 83
LOS: 6 days | Discharge: HOME HEALTH | DRG: 905 | End: 2018-11-07
Attending: SURGERY | Admitting: SURGERY
Payer: COMMERCIAL

## 2018-11-01 DIAGNOSIS — J44.9: ICD-10-CM

## 2018-11-01 DIAGNOSIS — Z96.653: ICD-10-CM

## 2018-11-01 DIAGNOSIS — I27.20: ICD-10-CM

## 2018-11-01 DIAGNOSIS — G62.9: ICD-10-CM

## 2018-11-01 DIAGNOSIS — T81.89XA: Primary | ICD-10-CM

## 2018-11-01 DIAGNOSIS — Z95.5: ICD-10-CM

## 2018-11-01 DIAGNOSIS — E03.9: ICD-10-CM

## 2018-11-01 DIAGNOSIS — I10: ICD-10-CM

## 2018-11-01 DIAGNOSIS — E78.5: ICD-10-CM

## 2018-11-01 DIAGNOSIS — I25.10: ICD-10-CM

## 2018-11-01 DIAGNOSIS — M81.0: ICD-10-CM

## 2018-11-01 DIAGNOSIS — C43.72: ICD-10-CM

## 2018-11-01 DIAGNOSIS — H35.30: ICD-10-CM

## 2018-11-01 PROCEDURE — 0HRLX74 REPLACEMENT OF LEFT LOWER LEG SKIN WITH AUTOLOGOUS TISSUE SUBSTITUTE, PARTIAL THICKNESS, EXTERNAL APPROACH: ICD-10-PCS | Performed by: SURGERY

## 2018-11-01 PROCEDURE — 0HBJXZZ EXCISION OF LEFT UPPER LEG SKIN, EXTERNAL APPROACH: ICD-10-PCS | Performed by: SURGERY

## 2018-11-01 RX ADMIN — BACITRACIN ZINC SCH APP: 500 OINTMENT TOPICAL at 20:33

## 2018-11-01 RX ADMIN — DOCUSATE SODIUM AND SENNOSIDES SCH TAB: 50; 8.6 TABLET ORAL at 20:34

## 2018-11-01 RX ADMIN — CEPHALEXIN SCH MG: 500 CAPSULE ORAL at 20:34

## 2018-11-01 RX ADMIN — VITAMIN D, TAB 1000IU (100/BT) SCH UNITS: 25 TAB at 11:50

## 2018-11-01 RX ADMIN — LISINOPRIL SCH: 5 TABLET ORAL at 10:40

## 2018-11-01 RX ADMIN — PRAVASTATIN SODIUM SCH MG: 40 TABLET ORAL at 20:33

## 2018-11-01 RX ADMIN — ASPIRIN 81 MG SCH MG: 81 TABLET ORAL at 20:34

## 2018-11-01 RX ADMIN — ENOXAPARIN SODIUM SCH MG: 100 INJECTION SUBCUTANEOUS at 11:50

## 2018-11-01 RX ADMIN — POLYETHYLENE GLYCOL 3350 SCH: 17 POWDER, FOR SOLUTION ORAL at 11:51

## 2018-11-01 RX ADMIN — DOCUSATE SODIUM AND SENNOSIDES SCH TAB: 50; 8.6 TABLET ORAL at 11:50

## 2018-11-01 RX ADMIN — AMITRIPTYLINE HYDROCHLORIDE SCH MG: 10 TABLET, FILM COATED ORAL at 20:34

## 2018-11-01 RX ADMIN — POLYETHYLENE GLYCOL 3350 SCH: 17 POWDER, FOR SOLUTION ORAL at 20:34

## 2018-11-01 RX ADMIN — BACITRACIN ZINC SCH: 500 OINTMENT TOPICAL at 11:50

## 2018-11-01 RX ADMIN — CEPHALEXIN SCH MG: 500 CAPSULE ORAL at 16:29

## 2018-11-01 RX ADMIN — GABAPENTIN SCH MG: 300 CAPSULE ORAL at 20:34

## 2018-11-01 RX ADMIN — CEPHALEXIN SCH MG: 500 CAPSULE ORAL at 11:50

## 2018-11-01 NOTE — POSTANESTH
Post Anesthetic Evaluation


Cardiovascular Status: Similar to Pre-Op Cond


Respiratory Status: Similar to Pre-op Cond.


Level of Consciousness/Mental Status: Can Participate in Eval, Moderately Sleepy


Pain Control: Adequate, Prn Tx Ordered


Nausea/Vomiting Control: Adequate, Prn Tx Ordered


Complications Possibly Related to Anesthesia: None Noted

## 2018-11-01 NOTE — GOP
DATE OF OPERATION:  11/01/2018



SURGEON:  Shannan Velazquez MD



ANESTHESIA:  General.



ANESTHESIOLOGIST:  Jose Alejandro Hennessy MD



PREOPERATIVE DIAGNOSIS:  Chronic traumatic wound, left lower extremity.



POSTOPERATIVE DIAGNOSIS:  Chronic traumatic wound, left lower extremity.



PROCEDURE PERFORMED:  Split-thickness skin graft, left lower extremity.



FINDINGS:  Wound measures 11 x 9 cm.





ESTIMATED BLOOD LOSS:  10 cc.



INDICATIONS:  The patient is an 82-year-old with multiple medical problems, who had wide local excisi
on of left lower extremity for melanoma.  She developed cellulitis.  She also had an exacerbation of 
COPD.  She is ultimately ready for split-thickness skin grafting.



DESCRIPTION OF PROCEDURE:  The patient was brought into the operating room, placed supine on the tabl
e.  General anesthesia was administered.  Her leg and thigh were prepped and draped in the usual ster
ile fashion.  The base of the wound was healthy with pink granulation tissue.  It measured 11 x 9 x 0
.1 cm.  I took a split-thickness skin graft 0.112 of an inch with the dermatome from her left upper t
high.  I performed hand pie crusting.  I stapled this into place on the wound.  The donor site was co
maura with an epinephrine-soaked sponge.  I placed Adaptic and wound VAC on the left lower extremity.
  Hemostasis achieved on the wound on the left thigh with electrocautery. Mepilex transfer, ABD, and 
Tegaderm applied.  She was awakened in the operating room, extubated, transferred to PACU in stable c
ondition.





Job #:  [id number]

## 2018-11-01 NOTE — POSTOPPROG
Post Op Note


Date of Operation: 11/01/18


Surgeon: Shannan Velazquez


Anesthesiologist: mariajose


Anesthesia: GET(General Endotracheal)


Pre-op Diagnosis: melanoma


Post-op Diagnosis: same


Indication: 82 w wound from melanoma


Procedure: stsg


Findings: 11x9 cm


Inf/Abcess present in the surg proc area at time of surgery?: No


EBL: Minimal


Drains: Wound Vac

## 2018-11-01 NOTE — PDANEPAE
ANE History of Present Illness





LLE split thickness skin graft





ANE Past Medical History





- Cardiovascular History


Hx Hypertension: Yes


Hx Arrhythmias: No


Hx Chest Pain: No


Hx Coronary Artery / Peripheral Vascular Disease: Yes


Hx CHF / Valvular Disease: Yes


Hx Palpitations: No





- Pulmonary History


Hx COPD: No


Hx Asthma/Reactive Airway Disease: Yes


Hx Recent Upper Respiratory Infection: No


Hx Oxygen in Use at Home: No


Hx Sleep Apnea: Yes


Pulmonary History Comment: PULM HTN





- Neurologic History


Hx Cerebrovascular Accident: No


Hx Seizures: No


Hx Dementia: No


Neurologic History Comment: PERIPHERAL NEUROPATHY





- Endocrine History


Hx Diabetes: No


Endocrine History Comment: HYPOTHYROID





- Renal History


Hx Renal Disorders: No





- Liver History


Hx Hepatic Disorders: No





- Neurological & Psychiatric Hx


Hx Neurological and Psychiatric Disorders: Yes


Neurological / Psychiatric History Comment: CYMBALTA FOR NEUROPATHY





- Cancer History


Hx Cancer: No





- Congenital Disorder History


Hx Congenital Disorders: No





- GI History


Hx Gastrointestinal Disorders: No





- Other Health History


Other Health History: NEG





- Chronic Pain History


Chronic Pain: No





- Surgical History


Prior Surgeries: COLTON TKA.  CATARACTS.  ANGIOGRAM W/STENT PLACEMENT





ANE Review of Systems


Review of systems is: negative


Review of Systems: 


blind





- Exercise capacity


METS (RN): 2 METS





ANE Patient History





- Allergies


Allergies/Adverse Reactions: 








Penicillins Allergy (Unknown, Verified 10/22/18 01:25)


 








- Home Medications


Home medications: home medication list seen and reviewed


Home Medications: 








Amitriptyline HCl [Elavil 10 mg (*)] 10 mg PO HS 08/07/15 [Last Taken 10/31/18]


Cholecalciferol Vit D3 [Vitamin D3 (*)] 2,000 units PO DAILY 08/07/15 [Last 

Taken 10/31/18]


DULoxetine [Cymbalta 60 MG (*)] 60 mg PO HS 08/07/15 [Last Taken 10/31/18]


Lisinopril [Zestril 5 mg (*)] 5 mg PO DAILY@08 08/07/15 [Last Taken 10/31/18]


Aspirin [Aspirin 81mg (*)] 81 mg PO HS 07/26/16 [Last Taken 10/31/18]


Multivitamins [Multivitamin (*)] 1 each PO DAILY@12 07/26/16 [Last Taken 10/31/

18]


Pravastatin Sodium [Pravachol] 40 mg PO HS 07/26/16 [Last Taken 10/31/18]


Gabapentin [Neurontin 300 MG (*)] 600 mg PO HS 09/13/18 [Last Taken 10/31/18]


Levothyroxine [Synthroid 150 mcg (*)] 150 mcg PO DAILY06 09/13/18 [Last Taken 10

/31/18]


Polyethylene Glycol 3350 [Miralax 17 gm (*)] 17 gm PO BID PRN 10/17/18 [Last 

Taken 10/31/18]








- NPO status


NPO Status: no food or drink >8 hours





- Anes Hx


Anes Hx: no prior problems





- Smoking Hx


Smoking Status: Former smoker





- Family Anes Hx


Family Anes Hx: none


Family Hx Anesthesia Complications: NEG





ANE Labs/Vital Signs





- Vital Signs


Vital Signs: reviewed preoperatively; see RN documention for details


Height: 165.1 cm


Weight: 92.986 kg





ANE Physical Exam





- Airway


Mallampati Score: Class 3


Mouth exam: dentures





- Pulmonary


Pulmonary: no respiratory distress





- Cardiovascular


Cardiovascular: regular rate and rhythym





- ASA Status


ASA Status: III





ANE Anesthesia Plan


Anesthesia Plan: GA w LMA

## 2018-11-01 NOTE — GCON
DATE OF CONSULTATION:  11/01/2018



REFERRING PHYSICIAN:  Shannan Velazquez MD



REASON FOR CONSULTATION:  Medical management of chronic medical conditions.



HISTORY OF PRESENT ILLNESS:  An 82-year-old female, recently diagnosed with 
lentigo malignant melanoma, T1b N0, status post wide local excision and left 
inguinal sentinel lymph node biopsy 09/13/2018.  She is followed closely by Dr. Velazquez.  Today, she underwent a split-thickness skin graft with wound VAC.  



Recently hospitalized end of October for acute COPD exacerbation complicated by 
SVT. Currently denies chest pain, shortness of breath, palpitations, dizziness, 
or lightheadedness. Has chronic neuropathy, does not feel her feet.  No pain at 
surgical site. 



She was also hospitalized 10/17/2018 with MSSA postop wound infection, being 
treated with Keflex.



REVIEW OF SYSTEMS:  I completed a 10-point review of systems, negative, except 
as noted in HPI.



PAST MEDICAL HISTORY:  

1.  COPD, recently treated for exacerbation.

2.  History of MSSA postop infection, status post Keflex.

3.  SVT.

4.  Paroxysmal atrial fibrillation, CHADS-VASc score 5.

5.  Coronary artery disease, status post 1 stent.

6.  Hypertension.

7.  Hyperlipidemia.

8.  Osteoporosis.

9.  Hypothyroidism.

10.  Macular degeneration with blindness.

11.  Pulmonary hypertension.

12.  Peripheral neuropathy.

13.  Diastolic heart failure.



PAST SURGICAL HISTORY:  Bilateral TKA, prior wide excision of melanoma.



SOCIAL HISTORY:  She is  greater than 50 years to her  and lives 
in [place].  Prior smoker.  She was currently living at [facility].  She has 2 
kids and 2 grandchildren.



FAMILY HISTORY:  Noncontributory.



HOME MEDICATIONS:  Zofran as needed, lidocaine patch, senna, levothyroxine 150 
mcg daily, vitamin D3, Tylenol, pravastatin 40 mg q.h.s., MiraLAX, multivitamin
, gabapentin 600 mg q.h.s., Zestril 5 mg daily, enoxaparin 40 mg subcu daily, 
DuoNeb as needed, Keflex 500 mg t.i.d., duloxetine 60 mg daily, bacitracin, 
aspirin, and amitriptyline.



ALLERGIES: PCN



PHYSICAL EXAMINATION:  VITAL SIGNS:  Temperature 36.8, blood pressure 100/53, 
heart rate in the 60s, respirations 14, 92% on room air.  GENERAL:  She is 
sleepy from anesthesia, but no acute distress.  HEENT:  PERRLA.  Moist mucous 
membranes.  CV:  Regular rate and rhythm.  LUNGS:  Clear, diminished.  No 
wheezes.  ABDOMEN:  Soft, nontender, nondistended.  Positive bowel sounds.  :
  No Gaspar.  MUSCULOSKELETAL:  Left leg with a wound VAC.  No tenderness.  NEURO
:  2 through 12 intact.  No sensation to touch over bilateral feet.  She has 
sensation beginning at her ankle.  PSYCH:  Alert and oriented x3.



LABS:  Last WBC was 10/23/2018; it was 26.  Hemoglobin 12, hematocrit 28, 
platelets 13.  Creatinine on 10/22 was 0.7.



ASSESSMENT/PLAN:  

1.  Lentigo melanoma:  split-thickness skin graft with wound VAC placement 
today.  Wound care per Surgery.

2.  COPD:  no evidence of exacerbation.

3.  Paroxysmal atrial fibrillation:  CHADS-VASc score 5.  Will need systemic 
anticoagulation. D/w Surgery as far as timing.

4.  CAD: h/o 1 stent. ASA, statin..

5.  Recent MSSA postoperative infection: Keflex.

6.  Hypothyroidism:  Levothyroxine.

7.  Hypertension:  Zestril.

8.  Peripheral neuropathy:  Amitriptyline and gabapentin.

9.  Hyperlipidemia:  Statin.

10.  Macular degeneration, she is blind.

11.  Pulmonary hypertension:  Caution with fluids and diuresis.





Thank you for this consultation.  We will follow along. Please call if any 
questions.





Job #:  [id number]

MTDD

## 2018-11-01 NOTE — PDMN
Medical Necessity


Medical necessity: Pt meets inpt criteria per MD order and [facility]. 81 y/o w/hx LLE melanoma w/recent wide local excision, several 

admissions following original surgery for wound infection and COPD exacerbation

, wound vac placement; admitted now for split thickness skin graft.

## 2018-11-02 LAB — PLATELET # BLD: 426 10^3/UL (ref 150–400)

## 2018-11-02 RX ADMIN — ENOXAPARIN SODIUM SCH MG: 100 INJECTION SUBCUTANEOUS at 10:27

## 2018-11-02 RX ADMIN — CEPHALEXIN SCH MG: 500 CAPSULE ORAL at 16:55

## 2018-11-02 RX ADMIN — DOCUSATE SODIUM AND SENNOSIDES SCH TAB: 50; 8.6 TABLET ORAL at 10:30

## 2018-11-02 RX ADMIN — BACITRACIN ZINC SCH APP: 500 OINTMENT TOPICAL at 20:57

## 2018-11-02 RX ADMIN — LEVOTHYROXINE SODIUM SCH MCG: 150 TABLET ORAL at 05:55

## 2018-11-02 RX ADMIN — DOCUSATE SODIUM AND SENNOSIDES SCH TAB: 50; 8.6 TABLET ORAL at 20:57

## 2018-11-02 RX ADMIN — GABAPENTIN SCH MG: 300 CAPSULE ORAL at 20:57

## 2018-11-02 RX ADMIN — POLYETHYLENE GLYCOL 3350 SCH: 17 POWDER, FOR SOLUTION ORAL at 20:58

## 2018-11-02 RX ADMIN — BACITRACIN ZINC SCH APP: 500 OINTMENT TOPICAL at 10:26

## 2018-11-02 RX ADMIN — CEPHALEXIN SCH MG: 500 CAPSULE ORAL at 20:57

## 2018-11-02 RX ADMIN — ASPIRIN 81 MG SCH MG: 81 TABLET ORAL at 20:57

## 2018-11-02 RX ADMIN — VITAMIN D, TAB 1000IU (100/BT) SCH UNITS: 25 TAB at 10:26

## 2018-11-02 RX ADMIN — AMITRIPTYLINE HYDROCHLORIDE SCH MG: 10 TABLET, FILM COATED ORAL at 20:57

## 2018-11-02 RX ADMIN — CEPHALEXIN SCH MG: 500 CAPSULE ORAL at 10:26

## 2018-11-02 RX ADMIN — LISINOPRIL SCH MG: 5 TABLET ORAL at 10:27

## 2018-11-02 RX ADMIN — POLYETHYLENE GLYCOL 3350 SCH GM: 17 POWDER, FOR SOLUTION ORAL at 10:30

## 2018-11-02 RX ADMIN — PRAVASTATIN SODIUM SCH MG: 40 TABLET ORAL at 20:57

## 2018-11-02 NOTE — ASMTCMCOM
CM Note

 

CM Note                       

Notes:

Spoke w/pt's dtr Gloria, pt lives at [facility] with her . Of note,he was admitted 

yesterday after a syncopal episode.



Pt was here several months ago then dc'd to [facility], from  she went home but then developed an 


infection.

She has a skin graft. 



OT recommends SNF/24hr care, PT eval pending. Discussed with dtr, pt prefers to go home but will 

wait for PT eval.



DC Plan: SNF vs AL + home care

 

Date Signed:  11/02/2018 03:53 PM

Electronically Signed By:Svitlana Garcia RN

## 2018-11-02 NOTE — HOSPPROG
Hospitalist Progress Note


Assessment/Plan: 





82F with recent melanoma resection, s/p STSG.





# COPD with recent exacerbation


   - no evidence of acute exacerbation currently


# recent cellulitis, wound cx with GAS and MSSA


   - cont keflex


# a-fib - off warfarin


   - currently NSR, not on js blockers


# CAD s/p PCI - cont asa/statin


# recent SVT in setting of COPD exacerbation - none currently


# htn - lisinopril


# macular degeneration/blindness


# peripheral neuropathy - cont elavil and ariana


# pulm-htn - last echo 39mmHg


# hypothyroid - synthroid





Subjective: no significant leg pain;  no SOB


Objective: 


 Vital Signs











Temp Pulse Resp BP Pulse Ox


 


 36.4 C   56 L  18   124/68 H  96 


 


 11/02/18 07:54  11/02/18 07:54  11/02/18 07:54  11/02/18 10:27  11/02/18 07:54








 Laboratory Results





 11/02/18 04:57 





 11/02/18 04:57 





 











 11/01/18 11/02/18 11/03/18





 05:59 05:59 05:59


 


Intake Total  1420 


 


Output Total  20 


 


Balance  1400 








chart reviewed


seen and discussed with Dr Marcus potter reviewed





- Physical Exam


Constitutional: no apparent distress, appears nourished


Cardiovascular: regular rate and rhythym, no murmur, rub, or gallop


Respiratory: no respiratory distress, no rales or rhonchi, clear to auscultation


Gastrointestinal: soft, non-tender abdomen, no palpable masses, No guarding, No 

rebound, No distension





ICD10 Worksheet


Patient Problems: 


 Problems











Problem Status Onset


 


Bronchospasm Acute  


 


Chronic obstructive pulmonary disease with acute exacerbation Acute  


 


Elevated WBC count Acute  


 


Pneumonia Acute  


 


Severe sepsis Acute  


 


Weakness Acute  


 


Wound infection Acute

## 2018-11-02 NOTE — SOAPPROG
SOAP Progress Note


Assessment/Plan: 


Assessment:











POD # 1 s/p STSG for wound s/p WLE malignant melanoma


Doing well


No pain


Change donor site prn


Wound vac x 5 days














Plan:





11/02/18 16:50





Objective: 





 Vital Signs











Temp Pulse Resp BP Pulse Ox


 


 36.6 C   56 L  18   114/50 L  91 L


 


 11/02/18 14:45  11/02/18 14:45  11/02/18 14:45  11/02/18 14:45  11/02/18 14:45








 Laboratory Results





 11/02/18 04:57 





 11/02/18 04:57 





 











 11/01/18 11/02/18 11/03/18





 05:59 05:59 05:59


 


Intake Total  1420 


 


Output Total  20 


 


Balance  1400 














ICD10 Worksheet


Patient Problems: 


 Problems











Problem Status Onset


 


Bronchospasm Acute  


 


Chronic obstructive pulmonary disease with acute exacerbation Acute  


 


Elevated WBC count Acute  


 


Pneumonia Acute  


 


Severe sepsis Acute  


 


Weakness Acute  


 


Wound infection Acute

## 2018-11-03 RX ADMIN — CEPHALEXIN SCH MG: 500 CAPSULE ORAL at 19:42

## 2018-11-03 RX ADMIN — DOCUSATE SODIUM AND SENNOSIDES SCH: 50; 8.6 TABLET ORAL at 11:28

## 2018-11-03 RX ADMIN — VITAMIN D, TAB 1000IU (100/BT) SCH UNITS: 25 TAB at 10:16

## 2018-11-03 RX ADMIN — GABAPENTIN SCH MG: 300 CAPSULE ORAL at 19:39

## 2018-11-03 RX ADMIN — BACITRACIN ZINC SCH APP: 500 OINTMENT TOPICAL at 19:39

## 2018-11-03 RX ADMIN — DOCUSATE SODIUM AND SENNOSIDES SCH TAB: 50; 8.6 TABLET ORAL at 19:39

## 2018-11-03 RX ADMIN — POLYETHYLENE GLYCOL 3350 SCH: 17 POWDER, FOR SOLUTION ORAL at 11:28

## 2018-11-03 RX ADMIN — PRAVASTATIN SODIUM SCH MG: 40 TABLET ORAL at 19:40

## 2018-11-03 RX ADMIN — CEPHALEXIN SCH MG: 500 CAPSULE ORAL at 16:50

## 2018-11-03 RX ADMIN — ENOXAPARIN SODIUM SCH MG: 100 INJECTION SUBCUTANEOUS at 10:16

## 2018-11-03 RX ADMIN — POLYETHYLENE GLYCOL 3350 SCH: 17 POWDER, FOR SOLUTION ORAL at 19:40

## 2018-11-03 RX ADMIN — BACITRACIN ZINC SCH APP: 500 OINTMENT TOPICAL at 10:16

## 2018-11-03 RX ADMIN — LISINOPRIL SCH MG: 5 TABLET ORAL at 10:15

## 2018-11-03 RX ADMIN — CEPHALEXIN SCH MG: 500 CAPSULE ORAL at 10:16

## 2018-11-03 RX ADMIN — AMITRIPTYLINE HYDROCHLORIDE SCH MG: 10 TABLET, FILM COATED ORAL at 19:39

## 2018-11-03 RX ADMIN — LEVOTHYROXINE SODIUM SCH MCG: 150 TABLET ORAL at 10:15

## 2018-11-03 RX ADMIN — ASPIRIN 81 MG SCH MG: 81 TABLET ORAL at 19:39

## 2018-11-03 NOTE — HOSPPROG
Hospitalist Progress Note


Assessment/Plan: 





82F with recent melanoma resection, s/p STSG.





# COPD with recent exacerbation


   - no evidence of acute exacerbation currently


   - cont nebs


# recent cellulitis, wound cx with GAS and MSSA


   - cont keflex


# a-fib - off warfarin


   - currently NSR, not on js blockers


# CAD s/p PCI - cont asa/statin


# recent SVT in setting of COPD exacerbation - none currently


   - did not tolerate BB


# htn - lisinopril


# macular degeneration/blindness


# peripheral neuropathy - cont elavil and ariana


# pulm-htn - last echo 39mmHg


# hypothyroid - synthroid





Subjective: eating well; no CP/SOB


Objective: 


 Vital Signs











Temp Pulse Resp BP Pulse Ox


 


 36.8 C   63   16   114/64   89 L


 


 11/03/18 11:48  11/03/18 11:48  11/03/18 11:48  11/03/18 11:48  11/03/18 11:48








 Laboratory Results





 11/02/18 04:57 





 11/02/18 04:57 





 











 11/02/18 11/03/18 11/04/18





 05:59 05:59 04:59


 


Intake Total 1420  


 


Output Total 220 375 


 


Balance 1200 -375 














- Physical Exam


Constitutional: no apparent distress


Cardiovascular: regular rate and rhythym, no murmur, rub, or gallop


Respiratory: no respiratory distress, no rales or rhonchi, clear to auscultation


Gastrointestinal: soft, non-tender abdomen, tenderness, No guarding, No rebound

, No distension





ICD10 Worksheet


Patient Problems: 


 Problems











Problem Status Onset


 


Weakness Acute  


 


Pneumonia Acute  


 


Bronchospasm Acute  


 


Severe sepsis Acute  


 


Wound infection Acute  


 


Elevated WBC count Acute  


 


Chronic obstructive pulmonary disease with acute exacerbation Acute

## 2018-11-03 NOTE — SOAPPROG
SOAP Progress Note


Assessment/Plan: 


Assessment:











POD # 2 s/p STSG for wound s/p WLE malignant melanoma


Doing well


No pain


Change donor site prn - very little drainage.


Wound vac x 5 days














Plan:





11/02/18 16:50





11/03/18 12:36





Objective: 





 Vital Signs











Temp Pulse Resp BP Pulse Ox


 


 36.8 C   63   16   114/64   89 L


 


 11/03/18 11:48  11/03/18 11:48  11/03/18 11:48  11/03/18 11:48  11/03/18 11:48








 Laboratory Results





 11/02/18 04:57 





 11/02/18 04:57 





 











 11/02/18 11/03/18 11/04/18





 05:59 05:59 04:59


 


Intake Total 1420  


 


Output Total 220 375 


 


Balance 1200 -375 














ICD10 Worksheet


Patient Problems: 


 Problems











Problem Status Onset


 


Bronchospasm Acute  


 


Chronic obstructive pulmonary disease with acute exacerbation Acute  


 


Elevated WBC count Acute  


 


Pneumonia Acute  


 


Severe sepsis Acute  


 


Weakness Acute  


 


Wound infection Acute

## 2018-11-03 NOTE — ASMTCMCOM
CM Note

 

CM Note                       

Notes:

Spoke w/pt, she and her  live at [facility]. PT/OT recommend SNF but pt adamantly 

declines, she agrees to homecare, [facility]. She and her  also get non skilled 

care 1/week with [facility] ([place], [place]). CM w/f on Monday for f/u discussion on homecare.



DC Plan: TBD

 

Date Signed:  11/03/2018 04:43 PM

Electronically Signed By:Svitlana Garcia RN

## 2018-11-04 LAB — PLATELET # BLD: 421 10^3/UL (ref 150–400)

## 2018-11-04 RX ADMIN — GABAPENTIN SCH MG: 300 CAPSULE ORAL at 21:55

## 2018-11-04 RX ADMIN — CEPHALEXIN SCH MG: 500 CAPSULE ORAL at 08:14

## 2018-11-04 RX ADMIN — BACITRACIN ZINC SCH APP: 500 OINTMENT TOPICAL at 08:14

## 2018-11-04 RX ADMIN — ENOXAPARIN SODIUM SCH MG: 100 INJECTION SUBCUTANEOUS at 08:14

## 2018-11-04 RX ADMIN — ASPIRIN 81 MG SCH MG: 81 TABLET ORAL at 21:58

## 2018-11-04 RX ADMIN — BACITRACIN ZINC SCH APP: 500 OINTMENT TOPICAL at 21:55

## 2018-11-04 RX ADMIN — DOCUSATE SODIUM AND SENNOSIDES SCH TAB: 50; 8.6 TABLET ORAL at 21:54

## 2018-11-04 RX ADMIN — LEVOTHYROXINE SODIUM SCH MCG: 150 TABLET ORAL at 05:21

## 2018-11-04 RX ADMIN — POLYETHYLENE GLYCOL 3350 SCH: 17 POWDER, FOR SOLUTION ORAL at 11:17

## 2018-11-04 RX ADMIN — MAGNESIUM HYDROXIDE PRN ML: 400 SUSPENSION ORAL at 20:24

## 2018-11-04 RX ADMIN — LISINOPRIL SCH MG: 5 TABLET ORAL at 08:14

## 2018-11-04 RX ADMIN — PRAVASTATIN SODIUM SCH MG: 40 TABLET ORAL at 21:54

## 2018-11-04 RX ADMIN — DOCUSATE SODIUM AND SENNOSIDES SCH: 50; 8.6 TABLET ORAL at 11:16

## 2018-11-04 RX ADMIN — POLYETHYLENE GLYCOL 3350 SCH GM: 17 POWDER, FOR SOLUTION ORAL at 21:55

## 2018-11-04 RX ADMIN — AMITRIPTYLINE HYDROCHLORIDE SCH: 10 TABLET, FILM COATED ORAL at 21:55

## 2018-11-04 RX ADMIN — VITAMIN D, TAB 1000IU (100/BT) SCH UNITS: 25 TAB at 08:14

## 2018-11-04 NOTE — SOAPPROG
SOAP Progress Note


Assessment/Plan: 


Assessment:











POD # 3 s/p STSG for wound s/p WLE malignant melanoma


Doing well


No pain


Change donor site prn - very little drainage.


Will remove vac on Wed and can likely discharge to home





NO showers x 11 more days


change thigh dressing prn








vac to suction


mild staining on thigh dressing








Plan:





11/02/18 16:50





11/03/18 12:36





11/04/18 09:11





11/04/18 09:13





Objective: 





 Vital Signs











Temp Pulse Resp BP Pulse Ox


 


 36.5 C   64   18   128/66 H  96 


 


 11/04/18 07:26  11/04/18 07:26  11/04/18 07:26  11/04/18 07:26  11/04/18 07:26








 Laboratory Results





 11/04/18 05:07 





 11/04/18 05:07 





 











 11/03/18 11/04/18 11/05/18





 06:59 05:59 05:59


 


Output Total   


 


Balance   














ICD10 Worksheet


Patient Problems: 


 Problems











Problem Status Onset


 


Bronchospasm Acute  


 


Chronic obstructive pulmonary disease with acute exacerbation Acute  


 


Elevated WBC count Acute  


 


Pneumonia Acute  


 


Severe sepsis Acute  


 


Weakness Acute  


 


Wound infection Acute

## 2018-11-04 NOTE — HOSPPROG
Hospitalist Progress Note


Assessment/Plan: 





82F with recent melanoma resection, s/p STSG.





# COPD with recent exacerbation


   - no evidence of acute exacerbation currently


   - cont nebs


# recent cellulitis, wound cx with GAS and MSSA


   - stop keflex today


# a-fib - off warfarin


   - currently NSR, not on js blockers


   - will likely restart warfarin when wound vac removed


# CAD s/p PCI - cont asa/statin


# recent SVT in setting of COPD exacerbation - none currently


   - did not tolerate BB


# htn - lisinopril


# macular degeneration/blindness


# peripheral neuropathy - cont elavil and ariana


# pulm-htn - last echo 39mmHg


# hypothyroid - synthroid





# dvt ppx - lovenox





Subjective: no complaints;  no significant leg pain


Objective: 


 Vital Signs











Temp Pulse Resp BP Pulse Ox


 


 36.5 C   64   18   128/66 H  96 


 


 11/04/18 07:26  11/04/18 07:26  11/04/18 07:26  11/04/18 07:26  11/04/18 07:26








 Laboratory Results





 11/04/18 05:07 





 11/04/18 05:07 





 











 11/03/18 11/04/18 11/05/18





 06:59 05:59 05:59


 


Output Total   


 


Balance   














- Physical Exam


Constitutional: no apparent distress, appears nourished


Cardiovascular: regular rate and rhythym, no murmur, rub, or gallop


Respiratory: no respiratory distress, no rales or rhonchi, clear to auscultation


Gastrointestinal: soft, non-tender abdomen, no palpable masses, No guarding, No 

rebound, No distension





ICD10 Worksheet


Patient Problems: 


 Problems











Problem Status Onset


 


Weakness Acute  


 


Pneumonia Acute  


 


Bronchospasm Acute  


 


Severe sepsis Acute  


 


Wound infection Acute  


 


Elevated WBC count Acute  


 


Chronic obstructive pulmonary disease with acute exacerbation Acute

## 2018-11-05 RX ADMIN — DOCUSATE SODIUM AND SENNOSIDES SCH TAB: 50; 8.6 TABLET ORAL at 07:52

## 2018-11-05 RX ADMIN — POLYETHYLENE GLYCOL 3350 SCH GM: 17 POWDER, FOR SOLUTION ORAL at 21:35

## 2018-11-05 RX ADMIN — AMITRIPTYLINE HYDROCHLORIDE SCH: 10 TABLET, FILM COATED ORAL at 21:34

## 2018-11-05 RX ADMIN — ENOXAPARIN SODIUM SCH MG: 100 INJECTION SUBCUTANEOUS at 07:55

## 2018-11-05 RX ADMIN — LISINOPRIL SCH MG: 5 TABLET ORAL at 07:57

## 2018-11-05 RX ADMIN — ENOXAPARIN SODIUM SCH: 100 INJECTION SUBCUTANEOUS at 08:01

## 2018-11-05 RX ADMIN — PRAVASTATIN SODIUM SCH MG: 40 TABLET ORAL at 21:34

## 2018-11-05 RX ADMIN — LEVOTHYROXINE SODIUM SCH MCG: 150 TABLET ORAL at 05:25

## 2018-11-05 RX ADMIN — ASPIRIN 81 MG SCH MG: 81 TABLET ORAL at 21:34

## 2018-11-05 RX ADMIN — DOCUSATE SODIUM AND SENNOSIDES SCH TAB: 50; 8.6 TABLET ORAL at 21:34

## 2018-11-05 RX ADMIN — GABAPENTIN SCH MG: 300 CAPSULE ORAL at 21:34

## 2018-11-05 RX ADMIN — BACITRACIN ZINC SCH APP: 500 OINTMENT TOPICAL at 21:35

## 2018-11-05 RX ADMIN — MAGNESIUM HYDROXIDE PRN ML: 400 SUSPENSION ORAL at 05:25

## 2018-11-05 RX ADMIN — POLYETHYLENE GLYCOL 3350 SCH GM: 17 POWDER, FOR SOLUTION ORAL at 07:52

## 2018-11-05 RX ADMIN — BACITRACIN ZINC SCH: 500 OINTMENT TOPICAL at 08:01

## 2018-11-05 RX ADMIN — VITAMIN D, TAB 1000IU (100/BT) SCH UNITS: 25 TAB at 07:55

## 2018-11-05 NOTE — ASMTCMCOM
CM Note

 

CM Note                       

Notes:

CM met w/ pt and  for dispo planning. Both are agreeable to having a referral sent to 

[facility]. Referral sent through [email or web address]. CM spoke to Josey at [facility]. Josey is able 

to accept. CM confirmed pts address and phone number. Pt will continue with [facility] 1x/wk 


for private duty. CM to follow.







Plan: [facility]; PT, OT, RN, CNA w/ private duty

 

Date Signed:  11/05/2018 02:53 PM

Electronically Signed By:LETI Bryan

## 2018-11-05 NOTE — SOAPPROG
SOAP Progress Note


Assessment/Plan: 


Assessment/Plan: 81yo F POD#4 s/p STSG for wound s/p WLE malignant melanoma


Pain controlled


Dressings intact - change donor site PRN. WV will remain intact until Weds


Appreciate hospitalists


No showers 


Dispo: will remain inpatient until vac removal Weds, then likely DC home





S: feeling well, no pain, no new complaints this am


O: laying in bed, comfortable, NAD


No increased WOB


LLE wound vac intact. L thigh donor site dressing no staining.





Objective: 





 Vital Signs











Temp Pulse Resp BP Pulse Ox


 


 36.4 C   79   16   127/94 H  92 


 


 11/05/18 08:00  11/05/18 08:00  11/05/18 08:00  11/05/18 07:57  11/05/18 08:00








 Laboratory Results





 11/04/18 05:07 





 11/04/18 05:07 





 











 11/04/18 11/05/18 11/06/18





 05:59 05:59 05:59


 


Intake Total  750 


 


Balance  750 














ICD10 Worksheet


Patient Problems: 


 Problems











Problem Status Onset


 


Bronchospasm Acute  


 


Chronic obstructive pulmonary disease with acute exacerbation Acute  


 


Elevated WBC count Acute  


 


Pneumonia Acute  


 


Severe sepsis Acute  


 


Weakness Acute  


 


Wound infection Acute

## 2018-11-05 NOTE — HOSPPROG
Hospitalist Progress Note


Assessment/Plan: 





82F with recent melanoma resection, s/p STSG.





# COPD with recent exacerbation


   - no evidence of acute exacerbation currently


   - cont nebs


# constipation - aggressive bowel protocol


# recent cellulitis, wound cx with GAS and MSSA


   - keflex stopped 11/4


# a-fib - off warfarin


   - currently NSR, not on js blockers


   - will likely restart warfarin when wound vac removed


# CAD s/p PCI - cont asa/statin


# recent SVT in setting of COPD exacerbation - none currently


   - did not tolerate BB per report


# htn - lisinopril


# macular degeneration/blindness


# peripheral neuropathy - cont elavil and ariana


# pulm-htn - last echo 39mmHg


# hypothyroid - synthroid





# dvt ppx - lovenox





Subjective: c/o constipation


Objective: 


 Vital Signs











Temp Pulse Resp BP Pulse Ox


 


 36.4 C   79   16   127/94 H  92 


 


 11/05/18 08:00  11/05/18 08:00  11/05/18 08:00  11/05/18 07:57  11/05/18 08:00








 Laboratory Results





 11/04/18 05:07 





 11/04/18 05:07 





 











 11/04/18 11/05/18 11/06/18





 05:59 05:59 05:59


 


Intake Total  750 


 


Balance  750 














- Physical Exam


Constitutional: uncomfortable


Eyes: anicteric sclera


Ears, Nose, Mouth, Throat: hearing normal


Cardiovascular: regular rate and rhythym, no murmur, rub, or gallop


Respiratory: no respiratory distress, no rales or rhonchi, clear to auscultation


Gastrointestinal: No distension


Genitourinary: No grewal in urethra


Skin: warm


Musculoskeletal: full muscle strength


Neurologic: AAOx3


Psychiatric: not anxious





ICD10 Worksheet


Patient Problems: 


 Problems











Problem Status Onset


 


Weakness Acute  


 


Pneumonia Acute  


 


Bronchospasm Acute  


 


Severe sepsis Acute  


 


Wound infection Acute  


 


Elevated WBC count Acute  


 


Chronic obstructive pulmonary disease with acute exacerbation Acute

## 2018-11-06 LAB — PLATELET # BLD: 406 10^3/UL (ref 150–400)

## 2018-11-06 RX ADMIN — BACITRACIN ZINC SCH APP: 500 OINTMENT TOPICAL at 20:31

## 2018-11-06 RX ADMIN — ENOXAPARIN SODIUM SCH MG: 100 INJECTION SUBCUTANEOUS at 08:39

## 2018-11-06 RX ADMIN — ASPIRIN 81 MG SCH MG: 81 TABLET ORAL at 20:31

## 2018-11-06 RX ADMIN — GABAPENTIN SCH MG: 300 CAPSULE ORAL at 20:31

## 2018-11-06 RX ADMIN — PRAVASTATIN SODIUM SCH MG: 40 TABLET ORAL at 20:31

## 2018-11-06 RX ADMIN — POLYETHYLENE GLYCOL 3350 SCH: 17 POWDER, FOR SOLUTION ORAL at 20:32

## 2018-11-06 RX ADMIN — POLYETHYLENE GLYCOL 3350 SCH GM: 17 POWDER, FOR SOLUTION ORAL at 08:39

## 2018-11-06 RX ADMIN — DOCUSATE SODIUM AND SENNOSIDES SCH: 50; 8.6 TABLET ORAL at 20:32

## 2018-11-06 RX ADMIN — BACITRACIN ZINC SCH APP: 500 OINTMENT TOPICAL at 08:39

## 2018-11-06 RX ADMIN — VITAMIN D, TAB 1000IU (100/BT) SCH UNITS: 25 TAB at 08:38

## 2018-11-06 RX ADMIN — AMITRIPTYLINE HYDROCHLORIDE SCH MG: 10 TABLET, FILM COATED ORAL at 20:31

## 2018-11-06 RX ADMIN — LISINOPRIL SCH MG: 5 TABLET ORAL at 08:38

## 2018-11-06 RX ADMIN — DOCUSATE SODIUM AND SENNOSIDES SCH TAB: 50; 8.6 TABLET ORAL at 08:38

## 2018-11-06 RX ADMIN — LEVOTHYROXINE SODIUM SCH MCG: 150 TABLET ORAL at 05:54

## 2018-11-06 NOTE — SOAPPROG
SOAP Progress Note


Assessment/Plan: 


Assessment:











POD # 5 s/p STSG for wound s/p WLE malignant melanoma


Doing well


No pain


Change donor site prn - very little drainage.


Will remove vac on Wed and can likely discharge to home





NO showers x 9 more days


change thigh dressing prn








vac to suction


Thigh dressing dry








Plan:





11/02/18 16:50





11/03/18 12:36





11/04/18 09:11





11/04/18 09:13





11/06/18 13:05





Objective: 





 Vital Signs











Temp Pulse Resp BP Pulse Ox


 


 36.5 C   68   14   109/65   90 L


 


 11/06/18 07:39  11/06/18 07:39  11/06/18 07:39  11/06/18 07:39  11/06/18 07:39








 Laboratory Results





 11/06/18 05:11 





 11/06/18 05:11 





 











 11/05/18 11/06/18 11/07/18





 05:59 05:59 05:59


 


Intake Total 750  


 


Output Total  0 


 


Balance 750 0 














ICD10 Worksheet


Patient Problems: 


 Problems











Problem Status Onset


 


Bronchospasm Acute  


 


Chronic obstructive pulmonary disease with acute exacerbation Acute  


 


Elevated WBC count Acute  


 


Pneumonia Acute  


 


Severe sepsis Acute  


 


Weakness Acute  


 


Wound infection Acute

## 2018-11-06 NOTE — HOSPPROG
Hospitalist Progress Note


Assessment/Plan: 





82F with recent LLE melanoma resection with delayed wound healing now s/p STSG 

with wound vac assisted closure.





# LLE wound: 


   - plan to dc wound vac tomorrow





# Recent cellulitis (wound cx with GAS and MSSA)


   - stopped keflex 11/4


   - no further e/o infection





# COPD with recent exacerbation


   - no evidence of acute exacerbation currently


   - cont nebs





# constipation - aggressive bowel protocol





# ? afib - after review of chart, this is not chronic but rather had episode 

during last hospitalization in setting of infection


   - [id number]=2 but patient not interested in getting back on warfarin


   - for now, continue asa 81 daily


   - follow up with cardiology clinic (hasn't been seen since 2016) to discuss 

AC





# CAD s/p PCI - cont asa/statin





# recent SVT in setting of COPD exacerbation - none currently


   - did not tolerate BB per report





# htn - lisinopril





# macular degeneration/blindness





# peripheral neuropathy - cont elavil and ariana





# pulm-htn - last echo 39mmHg





# hypothyroid - synthroid





# dvt ppx - lovenox





Dispo: Remain inpatient, plan to dc tomorrow back to [place] with home health 

care. 


Subjective: Feeling well, no complaints. Wanting to go home tomorrow.


Objective: 


 Vital Signs











Temp Pulse Resp BP Pulse Ox


 


 36.5 C   68   14   109/65   90 L


 


 11/06/18 07:39  11/06/18 07:39  11/06/18 07:39  11/06/18 07:39  11/06/18 07:39








 Laboratory Results





 11/06/18 05:11 





 11/06/18 05:11 





 











 11/05/18 11/06/18 11/07/18





 05:59 05:59 05:59


 


Intake Total 750  


 


Output Total  0 


 


Balance 750 0 














- Physical Exam


Constitutional: no apparent distress, appears nourished, not in pain


Eyes: other (visually impaired)


Ears, Nose, Mouth, Throat: moist mucous membranes, hearing normal, ears appear 

normal, no oral mucosal ulcers


Cardiovascular: regular rate and rhythym, no murmur, rub, or gallop


Gastrointestinal: normoactive bowel sounds, soft, non-tender abdomen, no 

palpable masses


Genitourinary: no bladder fullness, no bladder tenderness, no renal bruits


Skin: other (LLE with wound vac in place)


Musculoskeletal: full muscle strength, no muscle tenderness, normal joint ROM


Neurologic: AAOx3, sensation intact bilaterally


Psychiatric: interacting appropriately, not anxious, not encephalopathic, 

thought process linear





ICD10 Worksheet


Patient Problems: 


 Problems











Problem Status Onset


 


Bronchospasm Acute  


 


Chronic obstructive pulmonary disease with acute exacerbation Acute  


 


Elevated WBC count Acute  


 


Pneumonia Acute  


 


Severe sepsis Acute  


 


Weakness Acute  


 


Wound infection Acute

## 2018-11-07 VITALS — SYSTOLIC BLOOD PRESSURE: 106 MMHG | DIASTOLIC BLOOD PRESSURE: 59 MMHG

## 2018-11-07 LAB — PLATELET # BLD: 385 10^3/UL (ref 150–400)

## 2018-11-07 RX ADMIN — VITAMIN D, TAB 1000IU (100/BT) SCH UNITS: 25 TAB at 09:17

## 2018-11-07 RX ADMIN — LISINOPRIL SCH MG: 5 TABLET ORAL at 09:17

## 2018-11-07 RX ADMIN — DOCUSATE SODIUM AND SENNOSIDES SCH: 50; 8.6 TABLET ORAL at 10:44

## 2018-11-07 RX ADMIN — POLYETHYLENE GLYCOL 3350 SCH: 17 POWDER, FOR SOLUTION ORAL at 10:44

## 2018-11-07 RX ADMIN — ENOXAPARIN SODIUM SCH MG: 100 INJECTION SUBCUTANEOUS at 09:20

## 2018-11-07 RX ADMIN — BACITRACIN ZINC SCH APP: 500 OINTMENT TOPICAL at 09:22

## 2018-11-07 RX ADMIN — LEVOTHYROXINE SODIUM SCH MCG: 150 TABLET ORAL at 05:48

## 2018-11-07 NOTE — PDIAF
- Diagnosis


Code Status: Full Code





- Medication Management


Discharge Medications: electronically signed and located in the Home Medication 

List.





- Orders


Services needed: Registered Nurse, Certified Nursing Aide, Physical Therapy, 

Occupational Therapy


Diet Recommendation: no restrictions on diet


Diet Texture: Regular Texture Diet


Wound Care Instructions: 1. LLE wound. Wound vac removed. Outer dressing 

include adaptic touch, fluffed 4x4s, kerlix.  2. Donor graft site on thigh. 

Mepliex and tegaderm.


Sutures/Staples Site: To be removed by Dr Miller


Activity/Weight Bearing Restrictions: No restrictions.


Additional Instructions: 


Here are your discharge instructions:


1. I have stopped your keflex (antibiotic) as you completed this. 


2. I have NOT started you on a blood thinner to prevent strokes with atrial 

fibrillation. I would like you to follow up with your cardiologist, Dr Martinez, in 

the next 1-2 weeks to address this and consider getting a cardiac event 

monitor. 


3. Please follow up with Dr Velazquez in 1 week and continue wound dressing changes.


4. No showers for 8 days. 


5. Wound care (left lower leg and thigh): 


   - keep staples in place until follow up with Dr Miller


   - re-apply outer dressing daily: adaptic touch, fluffed 4x4s, kerlix


   - mepilex and tegaderm to donor site





- Follow Up Care


Current Providers and Referrals: 


JANUARY KOROMA [Other]


Shannan Velazquez MD [Medical Doctor] - 11/15/18 3:00 pm (for staple removal)

## 2018-11-07 NOTE — ASDISCHSUM
----------------------------------------------

Discharge Information

----------------------------------------------

Plan Status:Home with Home Health                    Medically Cleared to Leave:11/06/2018

Discharge Date:11/07/2018 11:51 AM                    D/C Disposition:

ADT D/C Disposition:[id number]                        Projected Discharge Date:11/07/2018 11:00 AM

Transportation at D/C:                               Discharge Delay Reason:

Follow-Up Date:11/07/2018 11:00 AM                   Discharge Slot:

Final Diagnosis:

----------------------------------------------

Placement Information

----------------------------------------------

Referral Type:*Home Health Care Services             Referral ID:[id number]

Provider Name:[facility]

Address 1:[address]                 Phone [phone]

Address 2:                                           Fax [phone]

City:[place]                               Selection Factors:

State:[place]

 

----------------------------------------------

Patient Contact Information

----------------------------------------------

Contact Name:VITOR                           Relationship:

Address:[address] [place] [address]                             Home [phone]

                                                     Work [phone]

City:[place]                                         Alternate Phone:

State/Zip Code:[place] [address]                              Email:

----------------------------------------------

Financial Information

----------------------------------------------

Financial Class:Medicare Advantage Plans

Primary Plan Desc:[employer] PLANS         Primary Plan Number:[id number]

Secondary Plan Desc:                                 Secondary Plan Number:

 

 

----------------------------------------------

Assessment Information

----------------------------------------------

----------------------------------------------

LACE

----------------------------------------------

LACE

 

Length of stay for            Answers:  4-6 days                              

current admission                                                             

Acuity / Level of             Answers:  Yes                                   

Care: Did the patient                                                         

have an inpatient                                                             

admission?                                                                    

Comorbidities - select        Answers:  Chronic pulmonary disease             

all that apply                                                                

                                        Congestive heart failure              

                                        Coronary Artery Disease               

                                        Other                         Notes:  HTN; HLD; AFib; Hypothy
kenzie

                                                                              d

# of Emergency department     Answers:  3-4                                   

visits in the last 6                                                          

months                                                                        

Score: 17

 

Date Signed:  11/07/2018 11:58 AM

Electronically Signed By:LETI Bryan

 

 

----------------------------------------------

[facility] CM Progress Note

----------------------------------------------

CM Note

 

CM Note                       

Notes:

Spoke w/pt's dtr Gloria, pt lives at [facility] with her . Of note,he was admitted 

yesterday after a syncopal episode.



Pt was here several months ago then dc'd to [facility], from  she went home but then developed an 


infection.

She has a skin graft. 



OT recommends SNF/24hr care, PT eval pending. Discussed with dtr, pt prefers to go home but will 

wait for PT eval.



DC Plan: SNF vs AL + home care

 

Date Signed:  11/02/2018 03:53 PM

Electronically Signed By:Svitlana Garcia RN

 

 

----------------------------------------------

[facility] TATE Progress Note

----------------------------------------------

CM Note

 

CM Note                       

Notes:

Spoke w/pt, she and her  live at [facility]. PT/OT recommend SNF but pt adamantly 

declines, she agrees to homecare, [facility]. She and her  also get non skilled 

care 1/week with [facility]. CM w/f on Monday for f/u discussion on homecare.



DC Plan: TBD

 

Date Signed:  11/03/2018 04:43 PM

Electronically Signed By:Svitlana Garcia RN

 

 

----------------------------------------------

[facility] TATE Progress Note

----------------------------------------------

CM Note

 

CM Note                       

Notes:

CM met w/ pt and  for dispo planning. Both are agreeable to having a referral sent to 

[facility]. Referral sent through [employer]. CM spoke to Josey at [facility]. Josey is able 

to accept. CM confirmed pts address and phone number. Pt will continue with [facility] 1x/wk 


for private duty. CM to follow.







Plan: [facility]; PT, OT, RN, CNA w/ private duty

 

Date Signed:  11/05/2018 02:53 PM

Electronically Signed By:LETI Bryan

 

 

----------------------------------------------

Case Management Discharge Plan Note

----------------------------------------------

Case Management Discharge

 

Discharge Order Complete?     Answers:  Yes                                   

Patient to Obtain             Answers:  via Family                            

Medications                                                                   

Transportation Arranged       Answers:  Family/Friends                        

[employer] Complete               Answers:  No                                    

Case Management Transport     Answers:  No                                    

Form Complete                                                                 

Faxed Final Orders            Answers:  Yes                                   

Agency/Facility Transfer      Answers:  Yes                                   

Report Printed & Faxed to                                                     

Receiving Agency                                                              

Family Notified               Answers:  Yes                                   

Discharge Comments            

Notes:

Pts case discussed w/ Dr. Alves. Pt is being discharged today back to [facility] w/ her 

. DC orders sent to [facility]. Pts  will be providing the transportation. Pts 

wound vac came off today. Pt no longer needs it. CM returned the [employer] wound vac back to [facility]. Fatmata from [facility] picked it up. CM available for changes.







Plan: [facility]; PT, OT, RN, CNA

 

Date Signed:  11/07/2018 12:05 PM

Electronically Signed By:LETI Bryan

 

 

----------------------------------------------

Intervention Information

----------------------------------------------

## 2018-11-07 NOTE — PDDCSUM
Discharge Summary


Discharge Summary: 





Date of Admission: 11/1/2018


Date of Discharge: 11/7/2018





Consultants: general surgery (Dr Velazquez), hospitalist





Procedures/Studies:


1. Left lower extremity split thickness skin graft 11/1/2018


2. Left lower extremity wound vac placement 11/1/2018, removed 11/7/2018





Disposition: discharge back to [facility] living with home health care 

PT/OT/RN/CNA





Discharge Diagnoses:


1. Left leg wound s/p split thickness skin graft


2. Left leg lentigo malignant melanoma [id number] s/p wide local excision and left 

inguinal sentinal LN biopsy 9/13/18


3. Recent MSSA cellulitis (POA), resolved


4. COPD


5. Paroxysmal SVT


6. Reported h/o paroxysmal atrial fibrillation 


7. CAD s/p stent placement


8. Macular degeneration with legal blindness


9. Pulmonary hypertension, RVSP 39mmHg on recent echo


10. Peripheral neuropathy


11. Chronic diastolic hear failure (grade 1)


12. HTN, HLD, hypothyroidism





Brief Hospital Course:


82F with recent LLE melanoma resection with delayed wound healing admitted for 

STSG with wound vac assisted closure by Dr Velazquez. She did well post-operatively 

without complication. Wound vac was removed on day of discharge and she will 

continue regular dressing changes and follow up with Dr Velazquez. Of note, she was 

recently diagnosed with MSSA post-op infection and completed a course of 

keflex. Otherwise, no other medication changes were made.





Of note, she reportedly had a brief episode of asymptomatic atrial fibrillation 

during recent hospitalization 10/22-25 in the setting of COPD exacerbation. An 

echocardiogram from 9/23/2018 was without valvular disease and showed normal 

size atria. There are no ECGs in our system that captured atrial fibrillation. 

Her [id number] score is at least 5, conferring a 7.2% annual risk of CVA. She 

was not started on anticoagulation during that hospital stay due to plans for 

upcoming surgery. Her has-bled score confers a 5.8% chance of major bleed. 

Additionally, there are significant concerns for falls given her blindness, 

peripheral neuropathy, and recent leg surgery. After careful consideration with 

patient, we have decided to hold on therapeutic anticoagulation for now. She is 

on a baby aspirin. She will follow up with her cardiologist, Dr Martinez, for 

further discussion of anticoagulation and to consider a cardiac event monitor. 





Medications: Please refer to EMR for complete list. Changes this admission 

include discontinuing cephalexin and prophylactic lovenox. 





Follow Up Plan:


1. Clinic visit with Dr Velazquez on 11/15


2. Continue routine wound care


3. Call Dr Martinez's office to set up appointment. Should be seen in next few 

weeks to address anticoagulation for afib, consider cardiac event monitor to 

gauge afib burden.





Physical Exam: Vitals reviewed, afebrile. Alert and oriented, blind. RRR 

without murmur, lungs clear, abdomen soft, no leg edema or JVD. Left lower leg 

wrapped.

## 2018-11-07 NOTE — ASMTDCNOTE
Case Management Discharge

 

Discharge Order Complete?     Answers:  Yes                                   

Patient to Obtain             Answers:  via Family                            

Medications                                                                   

Transportation Arranged       Answers:  Family/Friends                        

[employer] Complete               Answers:  No                                    

Case Management Transport     Answers:  No                                    

Form Complete                                                                 

Faxed Final Orders            Answers:  Yes                                   

Agency/Facility Transfer      Answers:  Yes                                   

Report Printed & Faxed to                                                     

Receiving Agency                                                              

Family Notified               Answers:  Yes                                   

Discharge Comments            

Notes:

Pts case discussed w/ Dr. Alves. Pt is being discharged today back to [place] w/ her 

. DC orders sent to [facility]. Pts  will be providing the transportation. Pts 

wound vac came off today. Pt no longer needs it. CM returned the [employer] wound vac back to [facility]. Fatmata from [facility] picked it up. CM available for changes.







Plan: [facility]; PT, OT, RN, CNA

 

Date Signed:  11/07/2018 12:05 PM

Electronically Signed By:LETI Bryan

## 2018-11-07 NOTE — SOAPPROG
SOAP Progress Note


Assessment/Plan: 


Assessment/Plan: 83yo F POD#6 s/p STSG for wound s/p WLE malignant melanoma


Pain controlled


WV removed this am - >50% graft take. Dressings reapplied


Appreciate hospitalists


No showers x 8 more days


Dispo: DC today back to [place] with home care for dressing changes. FU 1 week





S: feeling well, no pain, no new complaints this am. excited to DC home


O: laying in bed, comfortable, NAD


No increased WOB


LLE wound vac removed. >50% graft take. Staples intact. Outer dressing 

reapplied - adaptic touch,fluffed 4x4s, kerlix


Donor site dressing changed - mepilex transfer and tegaderm





Objective: 





 Vital Signs











Temp Pulse Resp BP Pulse Ox


 


 36.5 C   82   14   106/59 L  90 L


 


 11/07/18 07:44  11/07/18 07:44  11/07/18 07:44  11/07/18 07:44  11/07/18 07:44








 Laboratory Results





 11/07/18 05:00 





 11/07/18 05:00 





 











 11/06/18 11/07/18 11/08/18





 05:59 05:59 05:59


 


Output Total 0 575 


 


Balance 0 -575 














ICD10 Worksheet


Patient Problems: 


 Problems











Problem Status Onset


 


Bronchospasm Acute  


 


Chronic obstructive pulmonary disease with acute exacerbation Acute  


 


Elevated WBC count Acute  


 


Pneumonia Acute  


 


Severe sepsis Acute  


 


Weakness Acute  


 


Wound infection Acute

## 2018-11-07 NOTE — ASMTLACE
LACE

 

Length of stay for            Answers:  4-6 days                              

current admission                                                             

Acuity / Level of             Answers:  Yes                                   

Care: Did the patient                                                         

have an inpatient                                                             

admission?                                                                    

Comorbidities - select        Answers:  Chronic pulmonary disease             

all that apply                                                                

                                        Congestive heart failure              

                                        Coronary Artery Disease               

                                        Other                         Notes:  HTN; HLD; AFib; Hypothy
kenzie

                                                                              d

# of Emergency department     Answers:  3-4                                   

visits in the last 6                                                          

months                                                                        

Score: 17

 

Date Signed:  11/07/2018 11:58 AM

Electronically Signed By:LETI Bryan

## 2018-11-09 ENCOUNTER — HOSPITAL ENCOUNTER (OUTPATIENT)
Dept: HOSPITAL 80 - FED | Age: 83
Setting detail: OBSERVATION
LOS: 4 days | Discharge: NURSING, REHAB OR LONG-TERM CARE | End: 2018-11-13
Attending: INTERNAL MEDICINE | Admitting: INTERNAL MEDICINE
Payer: COMMERCIAL

## 2018-11-09 DIAGNOSIS — R50.9: Primary | ICD-10-CM

## 2018-11-09 DIAGNOSIS — Z85.820: ICD-10-CM

## 2018-11-09 DIAGNOSIS — I48.0: ICD-10-CM

## 2018-11-09 DIAGNOSIS — Z87.891: ICD-10-CM

## 2018-11-09 DIAGNOSIS — H35.30: ICD-10-CM

## 2018-11-09 DIAGNOSIS — Z98.890: ICD-10-CM

## 2018-11-09 DIAGNOSIS — Z95.5: ICD-10-CM

## 2018-11-09 DIAGNOSIS — I25.10: ICD-10-CM

## 2018-11-09 DIAGNOSIS — I11.0: ICD-10-CM

## 2018-11-09 DIAGNOSIS — I50.32: ICD-10-CM

## 2018-11-09 DIAGNOSIS — E03.9: ICD-10-CM

## 2018-11-09 DIAGNOSIS — E78.5: ICD-10-CM

## 2018-11-09 DIAGNOSIS — R31.9: ICD-10-CM

## 2018-11-09 DIAGNOSIS — J44.9: ICD-10-CM

## 2018-11-09 LAB — PLATELET # BLD: 329 10^3/UL (ref 150–400)

## 2018-11-09 PROCEDURE — G0378 HOSPITAL OBSERVATION PER HR: HCPCS

## 2018-11-09 PROCEDURE — 97116 GAIT TRAINING THERAPY: CPT

## 2018-11-09 PROCEDURE — 97530 THERAPEUTIC ACTIVITIES: CPT

## 2018-11-09 PROCEDURE — 99285 EMERGENCY DEPT VISIT HI MDM: CPT

## 2018-11-09 PROCEDURE — 97166 OT EVAL MOD COMPLEX 45 MIN: CPT

## 2018-11-09 PROCEDURE — 71046 X-RAY EXAM CHEST 2 VIEWS: CPT

## 2018-11-09 PROCEDURE — 97535 SELF CARE MNGMENT TRAINING: CPT

## 2018-11-09 PROCEDURE — 97162 PT EVAL MOD COMPLEX 30 MIN: CPT

## 2018-11-09 RX ADMIN — Medication SCH MLS: at 22:18

## 2018-11-09 RX ADMIN — ACETAMINOPHEN PRN MG: 325 TABLET ORAL at 19:24

## 2018-11-09 NOTE — EDPHY
H & P


Stated Complaint: fever


Time Seen by Provider: 11/09/18 15:34


HPI/ROS: 





CHIEF COMPLAINT:  Fever





HISTORY OF PRESENT ILLNESS:  The patient is referred to the emergency 

department for a fever of 101 degrees.  The patient is recovering at a skilled 

nursing facility from recent skin graft from a melanoma surgery.  She has been 

getting wound care at the facility.  The patient denies any complaints of cough

, dysuria or abdominal pain.  She has been following up with her regular 

surgeon Dr. Velazquez.  She has not been on recent antibiotics.





The patient has been having increased weakness pain is unable to independently 

transfer.  She is sent from her assisted living facility with the report that 

they are no longer able to provide her with a level of care that she requires 

and that she will need to be readmitted to the hospital.








REVIEW OF SYSTEMS:


A comprehensive 10 point review of systems is otherwise negative aside from 

elements mentioned in the history of present illness.


Source: Patient, Family





- Personal History


Current Tetanus/Diphtheria Vaccine: Yes


Tetanus Vaccine Date: <10 years





- Medical/Surgical History


Hx Asthma: No


Hx Chronic Respiratory Disease: Yes


Hx Diabetes: No


Hx Cardiac Disease: No


Hx Renal Disease: No


Hx Cirrhosis: No


Hx Alcoholism: No


Hx HIV/AIDS: No


Hx Splenectomy or Spleen Trauma: No


Other PMH: hyperlipidemia, HTN, hypothyroid, cardiac stent, melanoma, 

peripheral neuropathy, wound vac left shin, legally blind





- Social History


Smoking Status: Former smoker





- Physical Exam


Exam: 





General Appearance:  Alert, no distress


Eyes:  Pupils equal and round no pallor or injection


ENT, Mouth:  Mucous membranes moist


Respiratory:  There are no retractions, lungs are clear to auscultation


Cardiovascular:  Regular rate and rhythm


Gastrointestinal:  Abdomen is soft and nontender, no masses, bowel sounds normal


Neurological:  A&O, normal motor function, normal sensory exam, normal cranial 

nerves


Skin:  Surgical incision is clean dry and intact without surrounding erythema 

or fluctuance


Musculoskeletal:  Neck is supple nontender


Extremities:  symmetrical, full range of motion








Constitutional: 


 Initial Vital Signs











Temperature (C)  37.8 C   11/09/18 15:23


 


Heart Rate  90   11/09/18 15:23


 


Respiratory Rate  18   11/09/18 15:23


 


Blood Pressure  139/81 H  11/09/18 15:23


 


O2 Sat (%)  97   11/09/18 15:23








 











O2 Delivery Mode               Room Air














Allergies/Adverse Reactions: 


 





Penicillins Allergy (Unknown, Verified 11/09/18 15:27)


 








Home Medications: 














 Medication  Instructions  Recorded


 


Amitriptyline HCl [Elavil 10 mg 10 mg PO HS 08/07/15





(*)]  


 


Cholecalciferol Vit D3 [Vitamin D3 2,000 units PO DAILY 08/07/15





(*)]  


 


DULoxetine [Cymbalta 60 MG (*)] 60 mg PO HS 08/07/15


 


Lisinopril [Zestril 5 mg (*)] 5 mg PO DAILY 08/07/15


 


Aspirin [Aspirin 81mg (*)] 81 mg PO HS 07/26/16


 


Multivitamins [Multivitamin (*)] 1 each PO DAILY@12 07/26/16


 


Pravastatin Sodium [Pravachol] 40 mg PO HS 07/26/16


 


Gabapentin [Neurontin 300 MG (*)] 600 mg PO HS 09/13/18


 


Levothyroxine [Synthroid 150 mcg 150 mcg PO DAILY06 09/13/18





(*)]  


 


Polyethylene Glycol 3350 [Miralax 17 gm PO BID 10/17/18





17 gm (*)]  


 


Acetaminophen [Tylenol 325mg (*)] 650 mg PO Q4HRS PRN  tab 10/19/18


 


Bacitracin Ointment 1 angela TP BID #1 pkt 10/25/18


 


Ipratropium/Albuterol [Duoneb (*)] 3 ml IH QID PRN  deyvial 10/25/18


 


Lidocaine 5% [Lidocaine 5% Oint] 1 angela TP HS 11/01/18


 


Ondansetron Odt [Zofran Odt 4 mg 4 mg PO Q6HRS PRN 11/01/18





(*)]  


 


Sennosides/Docusate Sodium 1 each PO BID 11/01/18





[Senna-S Tablet]  














Medical Decision Making


ED Course/Re-evaluation: 





The patient presents to the ED with several issues.  1. Is a fever without an 

obvious source.  She does have some mild erythema noted to her right leg 

however this seems to be most likely postsurgical in nature.  The patient did 

have blood cultures x2 obtained in the emergency department.  The 2nd issue is 

the patient will need to be admitted to the hospital for inability to ambulate 

and failure to thrive.





Consultation is made with Dr. Panchal from the hospitalist service who will 

admit the patient.  Urinalysis currently pending.





The patient is noted to have chronic leukocytosis which is not worsened today.





The patient will be started on Ancef given her history of MSSA and and recent 

surgery.





The patient does not have SIRS criteria or septic physiology.








Differential Diagnosis: 





Differential diagnosis considered includes urinary tract infection, pneumonia, 

cellulitis, abscess, bacteremia, viral syndrome, atelectasis





- Data Points


Laboratory Results: 


 Laboratory Results





 11/09/18 15:36 





 11/09/18 15:36 





 











  11/09/18 11/09/18





  15:36 15:36


 


WBC    14.71 10^3/uL H 10^3/uL





    (3.80-9.50) 


 


RBC    4.46 10^6/uL 10^6/uL





    (4.18-5.33) 


 


Hgb    13.9 g/dL g/dL





    (12.6-16.3) 


 


Hct    41.7 % %





    (38.0-47.0) 


 


MCV    93.5 fL fL





    (81.5-99.8) 


 


MCH    31.2 pg pg





    (27.9-34.1) 


 


MCHC    33.3 g/dL g/dL





    (32.4-36.7) 


 


RDW    14.2 % %





    (11.5-15.2) 


 


Plt Count    329 10^3/uL 10^3/uL





    (150-400) 


 


MPV    8.6 fL L fL





    (8.7-11.7) 


 


Neut % (Auto)    65.8 % %





    (39.3-74.2) 


 


Lymph % (Auto)    20.7 % %





    (15.0-45.0) 


 


Mono % (Auto)    8.6 % %





    (4.5-13.0) 


 


Eos % (Auto)    3.5 % %





    (0.6-7.6) 


 


Baso % (Auto)    0.7 % %





    (0.3-1.7) 


 


Nucleat RBC Rel Count    0.0 % %





    (0.0-0.2) 


 


Absolute Neuts (auto)    9.68 10^3/uL H 10^3/uL





    (1.70-6.50) 


 


Absolute Lymphs (auto)    3.05 10^3/uL H 10^3/uL





    (1.00-3.00) 


 


Absolute Monos (auto)    1.27 10^3/uL H 10^3/uL





    (0.30-0.80) 


 


Absolute Eos (auto)    0.51 10^3/uL H 10^3/uL





    (0.03-0.40) 


 


Absolute Basos (auto)    0.10 10^3/uL 10^3/uL





    (0.02-0.10) 


 


Absolute Nucleated RBC    0.00 10^3/uL 10^3/uL





    (0-0.01) 


 


Immature Gran %    0.7 % %





    (0.0-1.1) 


 


Immature Gran #    0.10 10^3/uL 10^3/uL





    (0.00-0.10) 


 


Sodium  138 mEq/L mEq/L  





   (135-145)  


 


Potassium  4.2 mEq/L mEq/L  





   (3.3-5.0)  


 


Chloride  97 mEq/L mEq/L  





   ()  


 


Carbon Dioxide  31 mEq/l mEq/l  





   (22-31)  


 


Anion Gap  10 mEq/L mEq/L  





   (6-14)  


 


BUN  12 mg/dL mg/dL  





   (7-23)  


 


Creatinine  1.0 mg/dL mg/dL  





   (0.6-1.0)  


 


Estimated GFR  53   





   


 


Glucose  128 mg/dL H mg/dL  





   ()  


 


Calcium  9.4 mg/dL mg/dL  





   (8.5-10.4)  














Departure





- Departure


Disposition: [facility]


Clinical Impression: 


 Fever, Weakness generalized





Condition: Good

## 2018-11-09 NOTE — GHP
DATE OF ADMISSION:  11/09/2018



PRIMARY SURGEON:  Dr. Shannan Velazquez.



CHIEF COMPLAINT:  Fever, weakness.



HISTORY OF PRESENT ILLNESS:  An 82-year-old female who was discharged from [facility] 
on 11/07/2018, after a left leg split-thickness skin graft and wound VAC 
placement.  Per nursing facility, she had a fever of 101 today.  She denies 
feeling warm and no chills or sweats.  No nausea, vomiting, diarrhea.  No pain 
in the legs.  Her appetite has been fine.  She does endorse weakness, which is 
confirmed by her .  It is difficult for her to sit up to eat.  Denies a 
cough.  No dysuria.  The Wound Care nurse came today for the 1st time and said 
the wounds look fine.  After initial skin graft, she has had a subsequent MSSA 
cellulitis that was treated with Keflex.



REVIEW OF SYSTEMS:  I completed a 10-point review of systems, negative except 
as noted in the HPI.



PAST MEDICAL HISTORY:  

1.  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 
excision and left sentinel lymph node biopsy on 09/08/2018.  

2.  Left lower extremity split-thickness skin graft 11/01/2018.  Wound VAC 
placed at that time and then removed on 11/07/2018.  

3.  MSSA cellulitis, postop.

4.  COPD.

5.  Paroxysmal SVT.

6.  Paroxysmal atrial fibrillation.

7.  Coronary disease, status post stents.

8.  Macular degeneration with legal blindness.

9.  Pulmonary hypertension, RVSP 39 mmHg.

10.  Peripheral neuropathy.

11.  Chronic diastolic heart failure, grade 1.

12.  Hypertension.

13.  Hyperlipidemia.

14.  Hypothyroidism.



PAST SURGICAL HISTORY:  Again, the left lower leg skin graft 11/01/2018, 
bilateral TKA.



SOCIAL HISTORY:  Lives in [place] with her .  They have been  
58 years.  No alcohol, tobacco, or illicits.



FAMILY HISTORY:  Noncontributory.



ALLERGIES: see "[employer]



HOME MEDICATIONS:  Senna, pravastatin 40 mg daily, MiraLAX, Zofran, multivitamin
, Zestril 5 mg daily, lidocaine, levothyroxine 150 mcg daily, DuoNeb, 
gabapentin 600 mg q.h.s., Cymbalta 60 mg q.h.s., vitamin D3 2000 units daily, 
bacitracin, aspirin 81 daily, amitriptyline 10 mg q.h.s., Tylenol.



PHYSICAL EXAM:  VITAL SIGNS:  Temperature here 37.8, blood pressure is 139/81, 
heart rate is in the 80s, respirations 14, 90% on room air.  GENERAL:  She is 
lying in bed, in no acute distress.  HEENT:  PERRLA.  Moist mucous membranes.  
She is legally blind.  CV:  Regular rate and rhythm.  RESPIRATORY:  Lungs are 
clear.  Diminished at the bases, but no crackles or wheezing.  ABDOMEN:  Obese, 
soft, nontender, nondistended.  Positive bowel sounds.  :  Gaspar in place.  
MUSCULOSKELETAL:  Left thigh skin graft area some purulence, but no surrounding 
erythema.  Calf wound with sutures and some purulence, some mild erythema 
surrounding.  She has no sensation over that area.  NEURO:  2 through 12 
intact.  PSYCH:  Alert and oriented x3.



LABORATORY DATA:  WBC is 14, which is the same as at discharge, hemoglobin 13, 
hematocrit 41, platelets 329.  Sodium 138, potassium 4.2, chloride 97, 
creatinine 1, baseline is 0.8 to 0.9, glucose is 128.  Urine is pending.  Chest 
x-ray is pending.



ASSESSMENT AND PLAN:  

1.  Fever:  The patient denies and none here currently.  Left leg wound site is 
a potential source.  There is some mild surrounding erythema.  Blood cultures 
were drawn in the ER.  Prior methicillin-sensitive Staphylococcus aureus 
bacteremia.  We will cover with [facility].  We will contact Dr. Velazquez in the 
morning.  Check a UA and chest x-ray.  She denies any other infectious symptoms.

2.  Chronic obstructive pulmonary disease.  No evidence of exacerbation.

3.  History of paroxysmal atrial fibrillation:  CHADS-VASc score is 5.  She is 
currently on an aspirin.  She will follow up with Dr. Martinez for full 
anticoagulation given possible upcoming surgeries.  

4.  Coronary disease.  History of stent.  Continue aspirin and statin.  

5.  Macular degeneration.  She is legally blind.

6.  Pulmonary hypertension.  No evidence of volume overload.

7.  Peripheral neuropathy.  Continue gabapentin.

8.  Compensated diastolic heart failure.  No evidence of overt edema.  Control 
blood pressure.

9.  Hypertension.  Resume home medications.  

10.  Hyperlipidemia.  Statin.

11.  Hypothyroidism.  Levothyroxine.

12.  Deconditioning:  [facility] does not feel that they can manage the patient.
  Will have Case Management involved.

13.  Diet:  Cardiac.

14.  DVT prophylaxis:  Lovenox. 

15.  Disposition:  Patient warrants inpatient admission given fever, warranting 
further infectious evaluation and IV antibiotics.





Job #:  [id number]

MTDD

## 2018-11-10 LAB — PLATELET # BLD: 288 10^3/UL (ref 150–400)

## 2018-11-10 RX ADMIN — PRAVASTATIN SODIUM SCH MG: 40 TABLET ORAL at 19:44

## 2018-11-10 RX ADMIN — ASPIRIN 81 MG SCH MG: 81 TABLET ORAL at 19:44

## 2018-11-10 RX ADMIN — ACETAMINOPHEN PRN MG: 325 TABLET ORAL at 06:40

## 2018-11-10 RX ADMIN — Medication SCH MLS: at 05:10

## 2018-11-10 RX ADMIN — GABAPENTIN SCH MG: 300 CAPSULE ORAL at 19:43

## 2018-11-10 RX ADMIN — THERA TABS SCH EACH: TAB at 15:38

## 2018-11-10 RX ADMIN — LIDOCAINE 4% SCH APP: 4 CREAM TOPICAL at 17:06

## 2018-11-10 RX ADMIN — ENOXAPARIN SODIUM SCH MG: 100 INJECTION SUBCUTANEOUS at 08:47

## 2018-11-10 RX ADMIN — ACETAMINOPHEN PRN MG: 325 TABLET ORAL at 16:53

## 2018-11-10 NOTE — ASMTCMCOM
CM Note

 

CM Note                       

Notes:

CM reviewed pt's chart and met with pt for d/c planning. Pt is an 83 y/o female who was recently 

d/[facility] (11/7) following a left leg lentigo and left lower extremity split-thickness skin graft for a 


malignant melanoma. She presented to the ED yesterday with a fever and weakness. The etiology of 

the fever and weakness is unclear. Pt is legally blind.  She lives at [facility] in assisted living 

with her . PT/OT have been ordered.  CM will follow.



 D/C Plan:  TBD

 

Date Signed:  11/10/2018 09:50 AM

Electronically Signed By:Keyla Mcmillan

## 2018-11-10 NOTE — SOAPPROG
SOAP Progress Note


Assessment/Plan: 


Assessment:





Kiley is well known to me.  She is status post wide local excision right lower 

extremity for malignant melanoma and sentinel lymph node biopsy followed by 

split-thickness skin graft.  Her wound VAC was removed on Wednesday morning.  

She was discharged home with home care.  She returned to the hospital last 

night with fevers.





Her donor site certainly has exudate on it but it does not appear to be 

infected.  I was able to remove most of it with a washcloth.  There is no 

surrounding erythema.





She has about 50% graft loss on her right lower extremity.  I am unsure if this 

is because the dressing was taken down and was not properly put back on, if 

there were some problem with the dressing while she was at her house or if 

there was simply going to to be some graft loss.  No signs of infection.  

Staples removed.  





Patient may shower without dressings.  Soap and water may run over wounds.   

Pat wound dry.





Thigh wound (donor site).  If she does not shower, placed a warm washcloth on 

the side dressing at least 1 time per day and wiped the exudate off the wound.  

Kiley understands that this stings for a bit but she tolerated very well.  Then 

place Mepilex transfer (or other non stick) followed by an ABD and stretch net.

  Do not put an occlusive dressing on the thigh because it needs some air





For the graft site on the right lower extremity, placed Adaptic Touch followed 

by fluffed 4x4s, Kerlix and Coban.  I wrap the Coban from the foot or the ankle 

all way to the knee to help prevent slippage




















Plan:





11/10/18 08:43





Objective: 





 Vital Signs











Temp Pulse Resp BP Pulse Ox


 


 36.9 C   83   24 H  111/70   95 


 


 11/10/18 07:20  11/10/18 07:20  11/10/18 07:20  11/10/18 07:20  11/10/18 03:09








 Laboratory Results





 11/10/18 08:15 





 











 11/09/18 11/10/18 11/11/18





 05:59 05:59 05:59


 


Output Total  300 


 


Balance  -300 














ICD10 Worksheet


Patient Problems: 


 Problems











Problem Status Onset


 


Fever Acute  


 


Weakness Acute  


 


Bronchospasm Acute  


 


Chronic obstructive pulmonary disease with acute exacerbation Acute  


 


Elevated WBC count Acute  


 


Pneumonia Acute  


 


Severe sepsis Acute  


 


Wound infection Acute

## 2018-11-10 NOTE — HOSPPROG
Hospitalist Progress Note


Assessment/Plan: 





DIAGNOSES: 


* fever with elevated white blood cell count, source unknown


* ongoing wound issues with recent split-thickness skin graft to calf, failure 

of part of that skin graft, and still healing her donor site


* Examined by Dr. Velazquez today who did her surgeries, with no signs of infection 

of the wounds found


* melanoma, which was the reason for her multiple calf surgeries


* hematuria likely due to cath urine specimen, no sxs of infection or UA 

findings of infection


* history of COPD currently stable, chronic pulmonary hypertension


* history of coronary disease and AFib, currently stable


* also history of hypertension, hyperlipidemia, hypothyroid, peripheral 

neuropathy


* chronic blindness, limited mobility from deconditioning





PLANS:


* Continue to follow temperatures and observe for signs of a specific source


* Follow blood cultures


* Will stop the empiric antibiotics at this time, consider resuming if we find 

evidence of a specific source or site of fever or organism


* DVT prophylaxis orders in place


* I have reconciled her home medicine list


* Wound care management of wounds


* Physical occupational therapy


* Apparently the [facility] where she lives does not feel like she can be 

managed there present, case management involvement





SUBJECTIVE:


some mild bitemporal HA without neurologic sxs


no URI, throat, pulm, urinary, or gi symptoms


no acute joint pains





OBJECTIVE


Vitals reviewed:  T-max 38 degrees last evening, afebrile now with otherwise 

stable vitals





Exam:


alert oriented w normal mentation, relaxed


skin warm dry color ok


resps not labored


lungs clear BSs


heart regular


abd soft nondistended nontender, bowel sounds present


limbs warm, no edema; dry dressings on her leg wounds


grewal in place with clear yellow urine





iv site ok





Lab data:


wbc improved; notably her lymphocytes and PMNs both mildly elevated











Objective: 


 Vital Signs











Temp Pulse Resp BP Pulse Ox


 


 36.9 C   83   24 H  111/70   95 


 


 11/10/18 07:20  11/10/18 07:20  11/10/18 07:20  11/10/18 07:20  11/10/18 03:09








 Laboratory Results





 11/10/18 08:15 





 











 11/09/18 11/10/18 11/11/18





 06:59 06:59 06:59


 


Output Total  300 


 


Balance  -300 














ICD10 Worksheet


Patient Problems: 


 Problems











Problem Status Onset


 


Fever Acute  


 


Weakness Acute  


 


Bronchospasm Acute  


 


Chronic obstructive pulmonary disease with acute exacerbation Acute  


 


Elevated WBC count Acute  


 


Pneumonia Acute  


 


Severe sepsis Acute  


 


Wound infection Acute

## 2018-11-10 NOTE — ASMTCMCOM
CM Note

 

CM Note                       

Notes:

CM spoke with daughter, Asya, who lives in [place].  [facility] not allowing pt to return at this 


time as her needs surpass their capacity. CM observed pt struggling to transition from her chair to 


her bed with a 2 person assist; it took 3 attempts.  Pt will require rehab.  She has been at 

[facility] in the past and she and her family were satisfied with their care.  A referral to 

[facility] will be sent out today.  D/C date unknown.



D/C Plan:  SNF

 

Date Signed:  11/10/2018 02:37 PM

Electronically Signed By:Keyla Mcmillan

## 2018-11-10 NOTE — PDMN
Medical Necessity


Medical necessity: Pt meets inpt criteria per MD order and MCG M-160, Sepsis 

and Other Febrile Illness, without Focal Infection. 81 y/o w/recent skin graft 

and wound vac placement admitted w/fever of unknown source, ongoing wound 

issues requiring wound care management. Comorbidities include melanoma (reason 

for mult calf surgeries), COPD, CAD, Afib, limited mobility due to 

deconditioning, chronic blindness. Est LOS>2MN for ongoing eval/management of 

multiple medical issues.

## 2018-11-11 RX ADMIN — ACETAMINOPHEN PRN MG: 325 TABLET ORAL at 04:40

## 2018-11-11 RX ADMIN — GABAPENTIN SCH MG: 300 CAPSULE ORAL at 20:39

## 2018-11-11 RX ADMIN — ASPIRIN 81 MG SCH MG: 81 TABLET ORAL at 20:39

## 2018-11-11 RX ADMIN — PRAVASTATIN SODIUM SCH MG: 40 TABLET ORAL at 20:39

## 2018-11-11 RX ADMIN — LISINOPRIL SCH MG: 5 TABLET ORAL at 10:03

## 2018-11-11 RX ADMIN — LIDOCAINE 4% SCH APP: 4 CREAM TOPICAL at 20:39

## 2018-11-11 RX ADMIN — ENOXAPARIN SODIUM SCH MG: 100 INJECTION SUBCUTANEOUS at 10:03

## 2018-11-11 RX ADMIN — VITAMIN D, TAB 1000IU (100/BT) SCH UNITS: 25 TAB at 10:04

## 2018-11-11 RX ADMIN — THERA TABS SCH EACH: TAB at 10:03

## 2018-11-11 RX ADMIN — LEVOTHYROXINE SODIUM SCH MCG: 150 TABLET ORAL at 04:40

## 2018-11-11 NOTE — HOSPPROG
Hospitalist Progress Note


Assessment/Plan: 





81 yo F w recent melanoma resection/skin graft here w fever





fever: resolved


   abx dc'd


   wounds c/d/i per surgery


   continue to follow





cad: asa/statin





dispo: lives at [place], requires SNF


   search ongoing





proph: [facility]





Subjective: case d/w dr olguin


Objective: 


 Vital Signs











Temp Pulse Resp BP Pulse Ox


 


 36.9 C   80   18   109/75   94 


 


 11/11/18 11:39  11/11/18 11:39  11/11/18 11:39  11/11/18 11:39  11/11/18 11:39








 Laboratory Results





 11/10/18 08:15 





 











 11/10/18 11/11/18 11/12/18





 05:59 05:59 05:59


 


Intake Total  900 1000


 


Output Total 300 380 300


 


Balance -300 520 700














- Physical Exam


Constitutional: no apparent distress, appears nourished, not in pain


Eyes: PERRL, anicteric sclera


Ears, Nose, Mouth, Throat: moist mucous membranes, hearing normal


Cardiovascular: regular rate and rhythym, no murmur, rub, or gallop


Respiratory: no respiratory distress, no rales or rhonchi


Gastrointestinal: normoactive bowel sounds, soft, non-tender abdomen


Genitourinary: No grewal in urethra


Skin: warm, other (wounds bandaged)


Musculoskeletal: full muscle strength, no muscle tenderness


Neurologic: AAOx3





ICD10 Worksheet


Patient Problems: 


 Problems











Problem Status Onset


 


Fever Acute  


 


Weakness Acute  


 


Bronchospasm Acute  


 


Chronic obstructive pulmonary disease with acute exacerbation Acute  


 


Elevated WBC count Acute  


 


Pneumonia Acute  


 


Severe sepsis Acute  


 


Wound infection Acute

## 2018-11-11 NOTE — SOAPPROG
SOAP Progress Note


Assessment/Plan: 


Assessment:





S/p wide local excision RLE for malignant melanoma and sentinel lymph node 

biopsy followed by split-thickness skin graft. She returned to the hospital on 

11/9/18 for fever of unknown origin. 


Improved from yesterday.


Temperature this morning 36.9 


Empiric Abx discontinued 





Pain is controlled 





Replaced dressing on graft site on left lateral lower leg with Adaptic Touch, 

fluffed 4x4s, Kerlix and Coban. I wrap the Coban from the foot or the ankle all 

way to the knee to help prevent slippage





If she does not shower, placed a warm washcloth on the side dressing at least 1 

time per day and wiped the exudate off the wound.  Kiley understands that this 

stings for a bit but she tolerated very well.  Then place Mepilex transfer (or 

other non stick) followed by an ABD and stretch net.  Do not put an occlusive 

dressing on the thigh because it needs some air





Patient may shower without dressings.  Soap and water may run over wounds.   

Pat wound dry.





S: Feeling better





O: 


Afebrile 


VSS


Laying flat in bed.


RRR


CTAB


Left lateral lower leg wound with some pink granulation tissue. The donor skin 

graft approximately 50% loss. No signs of infection or cellulitis. 


Thigh wound (donor site) with some exudate but no erythema surrounding. 

Improved from yesterday 























Plan:





11/10/18 08:43





11/11/18 11:04





11/11/18 11:06





11/11/18 11:07





11/11/18 11:08





11/11/18 11:14





11/11/18 11:16





Objective: 





 Vital Signs











Temp Pulse Resp BP Pulse Ox


 


 36.9 C   80   18   122/72 H  91 L


 


 11/11/18 08:30  11/11/18 08:30  11/11/18 08:30  11/11/18 08:30  11/11/18 08:30








 Laboratory Results





 11/10/18 08:15 





 











 11/10/18 11/11/18 11/12/18





 05:59 05:59 05:59


 


Intake Total  900 1000


 


Output Total 300 380 300


 


Balance -300 520 700














ICD10 Worksheet


Patient Problems: 


 Problems











Problem Status Onset


 


Fever Acute  


 


Weakness Acute  


 


Bronchospasm Acute  


 


Chronic obstructive pulmonary disease with acute exacerbation Acute  


 


Elevated WBC count Acute  


 


Pneumonia Acute  


 


Severe sepsis Acute  


 


Wound infection Acute

## 2018-11-12 RX ADMIN — VITAMIN D, TAB 1000IU (100/BT) SCH UNITS: 25 TAB at 11:08

## 2018-11-12 RX ADMIN — LEVOTHYROXINE SODIUM SCH MCG: 150 TABLET ORAL at 05:55

## 2018-11-12 RX ADMIN — LISINOPRIL SCH MG: 5 TABLET ORAL at 11:09

## 2018-11-12 RX ADMIN — ASPIRIN 81 MG SCH MG: 81 TABLET ORAL at 20:25

## 2018-11-12 RX ADMIN — LIDOCAINE 4% SCH APP: 4 CREAM TOPICAL at 20:24

## 2018-11-12 RX ADMIN — PRAVASTATIN SODIUM SCH MG: 40 TABLET ORAL at 20:25

## 2018-11-12 RX ADMIN — ENOXAPARIN SODIUM SCH MG: 100 INJECTION SUBCUTANEOUS at 11:09

## 2018-11-12 RX ADMIN — GABAPENTIN SCH MG: 300 CAPSULE ORAL at 20:25

## 2018-11-12 RX ADMIN — THERA TABS SCH EACH: TAB at 11:09

## 2018-11-12 NOTE — SOAPPROG
SOAP Progress Note


Assessment/Plan: 


Assessment/Plan: 81yo F s/p wide local excision LLE with SLN bx for melanoma, 

then s/p STSG RLE. Re-admitted with fever of unknown origin. 


Tmax 38 overnight


Off antibiotics


Wounds do not appear infected


Pain controlled


Wound care - graft site adaptic touch, fluffed 4x4s, Kerlix and Coban. donor 

site mepilex transfer, ABD and stretch net


Appreciate hospitalists


Plan: DC to SNF when placement





S: Feeling well. No SOB. No chills. No pain at wound site


O: Laying in bed, comfortable, NAD


No increased WOB


LLE graft site appx 50% graft loss. No surrounding erythema, purulence, 

evidence of active infection. LLE donor site dressing intact


























11/12/18 18:11





Objective: 





 Vital Signs











Temp Pulse Resp BP Pulse Ox


 


 37.2 C   89   12   131/72 H  92 


 


 11/12/18 15:14  11/12/18 15:14  11/12/18 15:14  11/12/18 15:14  11/12/18 15:14








 Laboratory Results





 11/10/18 08:15 





 











 11/11/18 11/12/18 11/13/18





 05:59 05:59 05:59


 


Intake Total 900 2240 450


 


Output Total 380 450 


 


Balance 520 1790 450














ICD10 Worksheet


Patient Problems: 


 Problems











Problem Status Onset


 


Fever Acute  


 


Weakness Acute  


 


Bronchospasm Acute  


 


Chronic obstructive pulmonary disease with acute exacerbation Acute  


 


Elevated WBC count Acute  


 


Pneumonia Acute  


 


Severe sepsis Acute  


 


Wound infection Acute

## 2018-11-12 NOTE — HOSPPROG
Hospitalist Progress Note


Assessment/Plan: 





81 yo F w recent melanoma resection/skin graft here w fever





fever: 38 ON


   blood cx neg


   abx dc'd


   wounds c/d/i


   cxr w no infiltrate (interp by me)


   continue to follow





   case d/w dr hadley





cad: asa/statin





dispo: lives at [place], requires SNF


   snf when avail





proph: [facility]





Subjective: wounds look good.  low grade temp overnight


Objective: 


 Vital Signs











Temp Pulse Resp BP Pulse Ox


 


 36.9 C   96   12   117/77   93 


 


 11/12/18 11:56  11/12/18 11:56  11/12/18 11:56  11/12/18 11:56  11/12/18 11:56








 Laboratory Results





 11/10/18 08:15 





 











 11/11/18 11/12/18 11/13/18





 05:59 05:59 05:59


 


Intake Total 900 2240 


 


Output Total 380 450 


 


Balance 520 1790 














- Physical Exam


Constitutional: no apparent distress, not in pain


Eyes: PERRL, anicteric sclera


Ears, Nose, Mouth, Throat: moist mucous membranes, hearing normal


Cardiovascular: regular rate and rhythym, no murmur, rub, or gallop


Respiratory: no respiratory distress, no rales or rhonchi


Gastrointestinal: normoactive bowel sounds, soft, non-tender abdomen


Genitourinary: no bladder fullness, No grewal in urethra


Skin: warm, other (wounds healing well w no purulence or odor)


Musculoskeletal: full muscle strength


Neurologic: AAOx3





ICD10 Worksheet


Patient Problems: 


 Problems











Problem Status Onset


 


Fever Acute  


 


Weakness Acute  


 


Bronchospasm Acute  


 


Chronic obstructive pulmonary disease with acute exacerbation Acute  


 


Elevated WBC count Acute  


 


Pneumonia Acute  


 


Severe sepsis Acute  


 


Wound infection Acute

## 2018-11-12 NOTE — ASMTCMCOM
CM Note

 

CM Note                       

Notes:

CM met w/ pt and  for dispo planning. [facility] has accepted pt. CM notified pt and  


of this. CM sent [facility] some updates. CM to follow.







Plan: [facility]

 

Date Signed:  11/12/2018 12:29 PM

Electronically Signed By:LETI Bryan

## 2018-11-13 VITALS — DIASTOLIC BLOOD PRESSURE: 70 MMHG | SYSTOLIC BLOOD PRESSURE: 100 MMHG

## 2018-11-13 RX ADMIN — LEVOTHYROXINE SODIUM SCH MCG: 150 TABLET ORAL at 05:58

## 2018-11-13 RX ADMIN — VITAMIN D, TAB 1000IU (100/BT) SCH UNITS: 25 TAB at 09:06

## 2018-11-13 RX ADMIN — LISINOPRIL SCH MG: 5 TABLET ORAL at 09:06

## 2018-11-13 RX ADMIN — THERA TABS SCH: TAB at 13:09

## 2018-11-13 RX ADMIN — ENOXAPARIN SODIUM SCH: 100 INJECTION SUBCUTANEOUS at 09:11

## 2018-11-13 NOTE — PDIAF
- Diagnosis


Diagnosis: skin graft failure


Code Status: Full Code





- Medication Management


Discharge Medications: electronically signed and located in the Home Medication 

List.





- Orders


Services needed: Registered Nurse, Certified Nursing Aide, Master 

, Physical Therapy, Occupational Therapy, Speech Language Pathologist


Additional Instructions: 


May shower


Pat wounds dry








Thigh: apply non stick such as mepilex transfer to wound and stretch net


LLE: Apply adaptic touch followed by fluffs.  Wrap kerlix and coban from toes 

to below the knee





- Follow Up Care


Current Providers and Referrals: 


Braden Hebert MD [Primary Care Provider] - As per Instructions


Shannan Velazquez MD [Medical Doctor] - follow up in 1 week (see Dr. Velazquez or 

Dolores mcnamara PA-C in 1 week)

## 2018-11-13 NOTE — GDS
DISCHARGE DIAGNOSES:  

1.  Recent melanoma with wide local excision, with skin graft.  She was discharged from 11/07.  

2.  Fever of uncertain etiology.  

3.  Left leg lentigo, malignant melanoma __________ N0.  She had a wide local excision on September 8
, 2018.

4.  History of methicillin-susceptible Staphylococcus aureus cellulitis, postoperative.

5.  Chronic obstructive pulmonary disease. 

6.  Paroxysmal supraventricular tachycardia.

7.  Atrial fibrillation.

8.  Coronary artery disease with stents.

9.  Macular degeneration with blindness.

10.  Pulmonary hypertension.

11.  Peripheral neuropathy.

12.  Deconditioning.

13.  Chronic diastolic heart failure.  



Please see the admission history and physical by Dr. Nessa Panchal.  The patient had presented with 
fever.  She had blood cultures drawn, which have grown nothing.  She has had smoldering low-grade tem
perature.  She did not have respiratory symptoms.  She had a urinalysis that was not consistent with 
infection. 



She had a chest x-ray with no infiltrate.  She was managed.  She received antibiotics for about a day
, and they were discontinued, observed off antibiotics with no resolution.  She was followed by [facility].  The surgeon who did her split-thickness skin graft felt that the wound looked good, and 
there was no evidence of infection.  Seen by physical therapy, and she was deconditioned.  She lives 
in independent living in [place] with her .  She is being discharged to [facility] for rehab
ilitation.  She has outpatient followup with Dr. Velazquez [facility].





Job #:  [id number]

## 2018-11-13 NOTE — HOSPPROG
Hospitalist Progress Note


Assessment/Plan: 





81 yo F w recent melanoma resection/skin graft here w fever





fever: 37.9 ON


   blood cx neg


   abx dc'd


   wounds c/d/i


   cxr w no infiltrate (interp by me)


   continue to follow





   case d/w dr hadley





cad: asa/statin





dispo: lives at [place], requires SNF


   [facility]


   > 30 minutes on dc





proph: [facility]





Subjective: low grade temp (37.9).  infectious workup has been neg.  amenable 

to [facility]


Objective: 


 Vital Signs











Temp Pulse Resp BP Pulse Ox


 


 36.9 C   78   28 H  111/69   92 


 


 11/13/18 08:05  11/13/18 08:05  11/13/18 08:05  11/13/18 09:06  11/13/18 08:05








 Laboratory Results





 11/10/18 08:15 





 











 11/12/18 11/13/18 11/14/18





 05:59 05:59 05:59


 


Intake Total 2240 450 


 


Output Total 450  


 


Balance 1790 450 














- Physical Exam


Constitutional: no apparent distress, appears nourished


Eyes: PERRL, anicteric sclera


Ears, Nose, Mouth, Throat: moist mucous membranes, hearing normal


Cardiovascular: regular rate and rhythym, no murmur, rub, or gallop


Respiratory: no respiratory distress, no rales or rhonchi


Gastrointestinal: normoactive bowel sounds, soft, non-tender abdomen


Genitourinary: no bladder fullness, No grewal in urethra


Skin: warm, normal color


Musculoskeletal: full muscle strength





ICD10 Worksheet


Patient Problems: 


 Problems











Problem Status Onset


 


Fever Acute  


 


Weakness Acute  


 


Bronchospasm Acute  


 


Chronic obstructive pulmonary disease with acute exacerbation Acute  


 


Elevated WBC count Acute  


 


Pneumonia Acute  


 


Severe sepsis Acute  


 


Wound infection Acute

## 2018-11-13 NOTE — ASMTDCNOTE
Case Management Discharge

 

Discharge Order Complete?     Answers:  Yes                                   

Patient to Obtain             Answers:  Other                         Notes:  [facility]

Medications                                                                   

Transportation Arranged       Answers:  Other                         Notes:  [facility] 

                                                                              Transportation w/c

Transport will Pick (Date     11/13/2018 01:00 PM

& Time)                       

[facility]TALA Complete               Answers:  No                                    

Case Management Transport     Answers:  No                                    

Form Complete                                                                 

Faxed Final Orders            Answers:  Yes                                   

Agency/Facility Transfer      Answers:  Yes                                   

Report Printed & Faxed to                                                     

Receiving Agency                                                              

Family Notified               Answers:  Yes                                   

Discharge Comments            

Notes:

CM spoke to Dr. Torrez regarding d/c POC. Pt is being discharged today. DC orders sent to [facility]. CM provided SCOTT Drew w/ phone number to give report. CM left a msg for pts daughter Gloria to 


inform her of the d/c. CM notified Ru[facility]to Rivas of the d/c to SNF. CM available for 

changes.







Plan: [facility]

 

Date Signed:  11/13/2018 10:18 AM

Electronically Signed By:LETI Bryan

## 2018-11-13 NOTE — ASMTLACE
LACE

 

Length of stay for            Answers:  4-6 days                              

current admission                                                             

Acuity / Level of             Answers:  Yes                                   

Care: Did the patient                                                         

have an inpatient                                                             

admission?                                                                    

Comorbidities - select        Answers:  Any tumor (including                  

all that apply                          lymphoma or leukemia)                 

                                        Other                         Notes:  HTN, cardiac stent

# of Emergency department     Answers:  1-2                                   

visits in the last 6                                                          

months                                                                        

Score: 11

 

Date Signed:  11/13/2018 10:09 AM

Electronically Signed By:LETI Bryan

## 2018-11-14 NOTE — ASDISCHSUM
----------------------------------------------

Discharge Information

----------------------------------------------

Plan Status:SNF                                      Medically Cleared to Leave:11/12/2018

Discharge Date:11/13/2018 01:05 PM                   CM D/C Disposition:

ADT D/C Disposition:Skilled Nursing Facility         Projected Discharge Date:11/13/2018 11:00 AM

Transportation at D/C:                               Discharge Delay Reason:

Follow-Up Date:11/13/2018 11:00 AM                   Discharge Slot:

Final Diagnosis:

----------------------------------------------

Placement Information

----------------------------------------------

Referral Type:*Nursing Home/SNF                      Referral ID:[id number]

Provider Name:[facility]

Address 1:[address]                             Phone [phone]

Address 2:                                           Fax [phone]

City:[place]                                         Selection Factors:

State:[place]

 

----------------------------------------------

Patient Contact Information

----------------------------------------------

Contact Name:SOPHIACATESCOTTIE                           Relationship:

Address:[facility] [place] [address]                             Home [phone]

                                                     Work [phone]

City:[place]                                         Alternate Phone:

State/Zip Code:[place] [address]                              Email:

----------------------------------------------

Financial Information

----------------------------------------------

Financial Class:Medicare Advantage [employer]

Primary Plan Desc:[employer]         Primary Plan Number:[id number]

Secondary Plan Desc:                                 Secondary Plan Number:

 

 

----------------------------------------------

Assessment Information

----------------------------------------------

----------------------------------------------

LACE

----------------------------------------------

LACE

 

Length of stay for            Answers:  4-6 days                              

current admission                                                             

Acuity / Level of             Answers:  Yes                                   

Care: Did the patient                                                         

have an inpatient                                                             

admission?                                                                    

Comorbidities - select        Answers:  Any tumor (including                  

all that apply                          lymphoma or leukemia)                 

                                        Other                         Notes:  HTN, cardiac stent

# of Emergency department     Answers:  1-2                                   

visits in the last 6                                                          

months                                                                        

Score: 11

 

Date Signed:  11/13/2018 10:09 AM

Electronically Signed By:LETI Bryan

 

 

----------------------------------------------

[facility] CM Progress Note

----------------------------------------------

CM Note

 

CM Note                       

Notes:

CM reviewed pt's chart and met with pt for d/c planning. Pt is an 81 y/o female who was recently 

d/nelson (11/7) following a left leg lentigo and left lower extremity split-thickness skin graft for a 


malignant melanoma. She presented to the ED yesterday with a fever and weakness. The etiology of 

the fever and weakness is unclear. Pt is legally blind.  She lives at [facility] in assisted living 

with her . PT/OT have been ordered.  CM will follow.



 D/C Plan:  TBD

 

Date Signed:  11/10/2018 09:50 AM

Electronically Signed By:Keyla Mcmillan

 

 

----------------------------------------------

[facility] TATE Progress Note

----------------------------------------------

CM Note

 

CM Note                       

Notes:

CM spoke with daughter, Asya, who lives in [place].  [facility] not allowing pt to return at this 


time as her needs surpass their capacity. CM observed pt struggling to transition from her chair to 


her bed with a 2 person assist; it took 3 attempts.  Pt will require rehab.  She has been at 

[facility] in the past and she and her family were satisfied with their care.  A referral to 

[facility] will be sent out today.  D/C date unknown.



D/C Plan:  SNF

 

Date Signed:  11/10/2018 02:37 PM

Electronically Signed By:Keyla Mcmillan

 

 

----------------------------------------------

[facility] CM Progress Note

----------------------------------------------

CM Note

 

CM Note                       

Notes:

CM met w/ pt and  for dispo planning. [facility] has accepted pt. CM notified pt and  


of this.  sent [facility] some updates. CM to follow.







Plan: [facility]

 

Date Signed:  11/12/2018 12:29 PM

Electronically Signed By:LETI Bryan

 

 

----------------------------------------------

Case Management Discharge Plan Note

----------------------------------------------

Case Management Discharge

 

Discharge Order Complete?     Answers:  Yes                                   

Patient to Obtain             Answers:  Other                         Notes:  [facility]

Medications                                                                   

Transportation Arranged       Answers:  Other                         Notes:  [facility] 

                                                                              Transportation w/c

Transport will Pick (Date     11/13/2018 01:00 PM

& Time)                       

[facility]AL[facility] Complete               Answers:  No                                    

Case Management Transport     Answers:  No                                    

Form Complete                                                                 

Faxed Final Orders            Answers:  Yes                                   

Agency/Facility Transfer      Answers:  Yes                                   

Report Printed & Faxed to                                                     

Receiving Agency                                                              

Family Notified               Answers:  Yes                                   

Discharge Comments            

Notes:

CM spoke to Dr. Torrez regarding d/c POC. Pt is being discharged today. DC orders sent to [facility]. CM provided SCOTT Drew w/ phone number to give report. CM left a msg for pts daughter Gloria to 


inform her of the d/c. CM notified [facility] of the d/c to SNF. CM available for 

changes.







Plan: [facility]

 

Date Signed:  11/13/2018 10:18 AM

Electronically Signed By:LETI Bryan

 

 

----------------------------------------------

Intervention Information

----------------------------------------------

Intervention Type:*IM-Signed                         Date of Service:11/13/2018 10:01 AM

Patient Type:Inpatient                               Staff Member:Daniela Taylor

Hours:                                               Discipline:

Severity:                                            Comment:

## 2019-01-07 ENCOUNTER — HOSPITAL ENCOUNTER (OUTPATIENT)
Dept: HOSPITAL 80 - FED | Age: 84
Setting detail: OBSERVATION
LOS: 3 days | Discharge: NURSING, REHAB OR LONG-TERM CARE | End: 2019-01-10
Attending: INTERNAL MEDICINE | Admitting: INTERNAL MEDICINE
Payer: COMMERCIAL

## 2019-01-07 DIAGNOSIS — H54.7: ICD-10-CM

## 2019-01-07 DIAGNOSIS — Z88.0: ICD-10-CM

## 2019-01-07 DIAGNOSIS — B34.9: Primary | ICD-10-CM

## 2019-01-07 DIAGNOSIS — E86.9: ICD-10-CM

## 2019-01-07 DIAGNOSIS — L97.929: ICD-10-CM

## 2019-01-07 DIAGNOSIS — J44.9: ICD-10-CM

## 2019-01-07 DIAGNOSIS — R53.1: ICD-10-CM

## 2019-01-07 DIAGNOSIS — Z85.820: ICD-10-CM

## 2019-01-07 DIAGNOSIS — I48.2: ICD-10-CM

## 2019-01-07 DIAGNOSIS — H35.30: ICD-10-CM

## 2019-01-07 DIAGNOSIS — E78.5: ICD-10-CM

## 2019-01-07 DIAGNOSIS — J02.9: ICD-10-CM

## 2019-01-07 DIAGNOSIS — I10: ICD-10-CM

## 2019-01-07 LAB
INR PPP: 1.08 (ref 0.83–1.16)
PLATELET # BLD: 311 10^3/UL (ref 150–400)
PROTHROMBIN TIME: 14.2 SEC (ref 12–15)

## 2019-01-07 PROCEDURE — 97163 PT EVAL HIGH COMPLEX 45 MIN: CPT

## 2019-01-07 PROCEDURE — 97530 THERAPEUTIC ACTIVITIES: CPT

## 2019-01-07 PROCEDURE — 96361 HYDRATE IV INFUSION ADD-ON: CPT

## 2019-01-07 PROCEDURE — 93005 ELECTROCARDIOGRAM TRACING: CPT

## 2019-01-07 PROCEDURE — 97535 SELF CARE MNGMENT TRAINING: CPT

## 2019-01-07 PROCEDURE — G0378 HOSPITAL OBSERVATION PER HR: HCPCS

## 2019-01-07 PROCEDURE — 96375 TX/PRO/DX INJ NEW DRUG ADDON: CPT

## 2019-01-07 PROCEDURE — 97166 OT EVAL MOD COMPLEX 45 MIN: CPT

## 2019-01-07 PROCEDURE — 96365 THER/PROPH/DIAG IV INF INIT: CPT

## 2019-01-07 PROCEDURE — 71046 X-RAY EXAM CHEST 2 VIEWS: CPT

## 2019-01-07 PROCEDURE — 99291 CRITICAL CARE FIRST HOUR: CPT

## 2019-01-07 PROCEDURE — 97116 GAIT TRAINING THERAPY: CPT

## 2019-01-07 NOTE — EDPHY
HPI/HX/ROS/PE/MDM


Narrative: 





CLINICAL IMPRESSION: 





Fever, weakness, leukocytosis, chronic wound





ASSESSMENT/PLAN: 





Patient is a 83-year-old female status post left leg split thickness skin graft 

11/1/2018 performed by Dr. Velazquez secondary to melanoma who presents to the 

emergency department generalized weakness and fever.  Patient is afebrile on 

arrival, tired appearing however not toxic-appearing.  Patient initially with a 

heart rate of 97 in combination with leukocytosis and possible wound infection 

sepsis protocol was initiated.  Physical examination reveals open wound on the 

left anterior lateral shin with surrounding hyperemia and associated mild calor

, granulating with some fibrinous exudate and purulent material centrally.  CBC 

revealed a leukocytosis of 14,000 with a left shift.  Lactate was normal at 1.6

, no evidence of severe sepsis.  BMP revealed no significant metabolic 

abnormality or evidence of acute kidney injury.  Blood cultures were obtained.  

An ECG was obtained and revealed no evidence of acute ischemia, reviewed by Dr. Henderson.  Chest x-ray revealed no evidence of consolidation or pneumonia.  

Urinalysis is still pending at this time as she was unable to give us a sample.

  History and physical examination is consistent with sepsis in light of 

elevated heart rate and leukocytosis, exact source unclear at this time however 

reasonable to consider open wound as source.  The patient does have a history 

of MSSA wound infection, Rocephin was given in the emergency department.  IV 

fluids were initiated per sepsis protocol however ordered judiciously to be 

given over 6 hr.  The patient will be admitted to the hospitalist service for 

further observation and management, I spoke directly with Dr. Martin who will 

be the admitting physician.  The patient remained hemodynamically stable while 

in the ED and prior to transfer to the floor.





DIFFERENTIAL DX: 


Pneumonia, UTI, wound infection, failure to thrive  


_________________ 


ED COURSE: 


6:11:  Discussed case with Dr. Henderson.


_________________ 


CHIEF COMPLAINT: Weakness, fever 


_________________ 


HPI: 





Patient is an 83-year-old female who currently resides in assisted living with 

a significant history of malignant melanoma status post resection and skin 

grafting of the left anterior shin 2 months ago.  Patient reports that she has 

been doing very well, saw Dr. Miller last Wednesday with reportedly no concerns.

  Patient sources she woke up this morning generally feeling unwell with fatigue

, malaise and fever of 101.  Patient denies any complaints of upper respiratory 

symptoms to include runny nose, congestion or cough.  She has had no chest pain 

or shortness of breath.  She denies any abdominal pain, urinary symptoms to 

include dysuria, hematuria or increased frequency.  Bowel movements have been 

normal and regular.  She has not been on any recent antibiotics.  Patient 

denies any new or worsening lower extremity edema. 


_________________ 


PMH:  Melanoma status post resection and skin grafting, hypertension, 

hyperlipidemia, neuropathy, chronic leukocytosis and MSSA infection 


Pertinent Past Surgical History:  Melanoma excision 


Family History:  Noncontributory 


Social History:  Denies alcohol, denies illicit drug use and denies smoking 


_________________ 


REVIEW OF SYSTEMS: 


All other systems negative 


Constitutional: Fever, chills, general malaise. 


Eyes: No discharge, vision change 


ENT: No sore throat, congestion, ear pain. 


Cardiovascular: No chest pain, no palpitations. 


Respiratory: No cough, no shortness of breath. 


Gastrointestinal: No abdominal pain, no vomiting, diarrhea. 


Genitourinary: No hematuria, dysuria, flank pain, pelvic pain 


Musculoskeletal: No back pain, joint swelling, joint pain, myalgias. 


Skin: Open wound left leg.  No rashes, color change. 


Neurological: No headache, dizziness. 


_________________ 


PHYSICAL EXAM: 


General Appearance: Alert, oriented, elderly, tired appearing however not toxic-

appearing, no hypoxia. 


HEENT: TMs are clear bilaterally no perforation or FB, no injection, no 

evidence of serous or mucopurulent otitis. Oropharynx clear is dry with no 

erythema or exudates, no tonsillar hypertrophy or asymmetry. Dentition without 

abnormality. 


Eyes: PERRLA, no acute vision change, nystagmus, swelling, discharge, pain or 

photosensitivity. Conjunctiva pink, no pallor or injection 


Neck: Supple, nontender, no lymphadenopathy, no midline pain, FROM, no 

meningismus. 


Respiratory: There are no retractions, lungs are clear to auscultation. 


Cardiac: Regular rate and rhythm, no murmurs or gallops. 


Gastrointestinal: Abdomen is soft, nontender, bowel sounds normal, no masses/

hernia, no rigidity, guarding or focal peritoneal findings. 


Neurological:  Alert and oriented x 3, CN 2-12 grossly intact, normal gait no 

ataxia, DTR's intact, normal sensation and strength 


Skin: Warm, dry. 


Musculoskeletal: [


Upper Extremities: Intact distal pulses, Full range of motion intact, no 

tenderness, no ecchymosis or edema


Lower Extremities: Intact distal pulses, No cyanosis, full range of motion 

intact, No calf tenderness bilaterally.  Pitting edema bilaterally





Left anterior shin with approximately 12 cm open wound with hyperemia, 

granulating center, fibrinous exudate with mildly purulent drainage centrally.  

Mildly warm to touch.





Psychiatric: Patient is oriented X 3, there is no agitation. 





Severe Sepsis: (2 SIRS criteria, suspect infection, lactate >2 or end-organ 

damage)


Suspected infection from for left lower extremity open wound


Time that criteria were met:  6:11 p.m.


*Blood cultures ordered and drawn before antibiotics.


*Antibiotics ordered.





Critical Care Time: I spent a total of greater than 35 minutes of critical care 

time with this patient including: Obtaining history, performing a physical exam

, bedside monitoring of interventions, interpreting tests and discussion with 

consultants. This does not include time spent performing procedures. Medical 

necessity for critical care in this case is sepsis.





_________________ 


MEDICAL DECISION MAKING: 


Patient was seen with Dr. Henderson. 


Diagnosis:  Fever, weakness, left lower extremity wound, sepsis. New, requires 

workup 


Summary:  See Assessment and Plan for summary of ED visit  


Clinical lab tests:  ordered / reviewed. 


Independent visualization of images, tracing, or specimens:  Yes. 


Decision to obtain medical records or history from someone other than the 

patient:  Yes 


Review / Summarize previous medical records:  Yes 


Discussed patient with another provider:  Yes, Dr. Henderson and Dr. Martin. 


Patient Progress:  Stable, admit. 





- Data Points


Imaging Results: 


 Imaging Impressions





Chest X-Ray  01/07/19 18:12


Impression:


Peribronchial thickening which could be related to mild fluid overload or 

bronchitis/airways disease.











Laboratory Results: 


 Laboratory Results





 01/07/19 18:04 





 01/07/19 18:04 





 











  01/07/19 01/07/19 01/07/19





  18:10 18:04 18:04


 


WBC      





    


 


RBC      





    


 


Hgb      





    


 


Hct      





    


 


MCV      





    


 


MCH      





    


 


MCHC      





    


 


RDW      





    


 


Plt Count      





    


 


MPV      





    


 


Neut % (Auto)      





    


 


Lymph % (Auto)      





    


 


Mono % (Auto)      





    


 


Eos % (Auto)      





    


 


Baso % (Auto)      





    


 


Nucleat RBC Rel Count      





    


 


Absolute Neuts (auto)      





    


 


Absolute Lymphs (auto)      





    


 


Absolute Monos (auto)      





    


 


Absolute Eos (auto)      





    


 


Absolute Basos (auto)      





    


 


Absolute Nucleated RBC      





    


 


Immature Gran %      





    


 


Immature Gran #      





    


 


PT      14.2 SEC SEC





     (12.0-15.0) 


 


INR      1.08 





     (0.83-1.16) 


 


APTT      29.6 SEC SEC





     (23.0-38.0) 


 


VBG Lactic Acid  1.6 mmol/L mmol/L    





   (0.7-2.1)   


 


Sodium    136 mEq/L mEq/L  





    (135-145)  


 


Potassium    4.3 mEq/L mEq/L  





    (3.5-5.2)  


 


Chloride    101 mEq/L mEq/L  





    ()  


 


Carbon Dioxide    23 mEq/l mEq/l  





    (22-31)  


 


Anion Gap    12 mEq/L mEq/L  





    (6-14)  


 


BUN    13 mg/dL mg/dL  





    (7-23)  


 


Creatinine    0.8 mg/dL mg/dL  





    (0.6-1.0)  


 


Estimated GFR    > 60   





    


 


Glucose    128 mg/dL H mg/dL  





    ()  


 


Calcium    9.6 mg/dL mg/dL  





    (8.5-10.4)  


 


Total Bilirubin    0.6 mg/dL mg/dL  





    (0.1-1.4)  














  01/07/19





  18:04


 


WBC  14.91 10^3/uL H 10^3/uL





   (3.80-9.50) 


 


RBC  5.35 10^6/uL H 10^6/uL





   (4.18-5.33) 


 


Hgb  16.0 g/dL g/dL





   (12.6-16.3) 


 


Hct  48.8 % H %





   (38.0-47.0) 


 


MCV  91.2 fL fL





   (81.5-99.8) 


 


MCH  29.9 pg pg





   (27.9-34.1) 


 


MCHC  32.8 g/dL g/dL





   (32.4-36.7) 


 


RDW  14.5 % %





   (11.5-15.2) 


 


Plt Count  311 10^3/uL 10^3/uL





   (150-400) 


 


MPV  8.6 fL L fL





   (8.7-11.7) 


 


Neut % (Auto)  76.2 % H %





   (39.3-74.2) 


 


Lymph % (Auto)  15.8 % %





   (15.0-45.0) 


 


Mono % (Auto)  6.5 % %





   (4.5-13.0) 


 


Eos % (Auto)  0.5 % L %





   (0.6-7.6) 


 


Baso % (Auto)  0.5 % %





   (0.3-1.7) 


 


Nucleat RBC Rel Count  0.0 % %





   (0.0-0.2) 


 


Absolute Neuts (auto)  11.37 10^3/uL H 10^3/uL





   (1.70-6.50) 


 


Absolute Lymphs (auto)  2.35 10^3/uL 10^3/uL





   (1.00-3.00) 


 


Absolute Monos (auto)  0.97 10^3/uL H 10^3/uL





   (0.30-0.80) 


 


Absolute Eos (auto)  0.07 10^3/uL 10^3/uL





   (0.03-0.40) 


 


Absolute Basos (auto)  0.08 10^3/uL 10^3/uL





   (0.02-0.10) 


 


Absolute Nucleated RBC  0.00 10^3/uL 10^3/uL





   (0-0.01) 


 


Immature Gran %  0.5 % %





   (0.0-1.1) 


 


Immature Gran #  0.07 10^3/uL 10^3/uL





   (0.00-0.10) 


 


PT  





  


 


INR  





  


 


APTT  





  


 


VBG Lactic Acid  





  


 


Sodium  





  


 


Potassium  





  


 


Chloride  





  


 


Carbon Dioxide  





  


 


Anion Gap  





  


 


BUN  





  


 


Creatinine  





  


 


Estimated GFR  





  


 


Glucose  





  


 


Calcium  





  


 


Total Bilirubin  





  











Medications Given: 


 





Sodium Chloride (Ns)  2,700 mls @ 450 mls/hr 30 ml/kg infuse over 6 hr (2700 ml

) IV EDNOW ONE


   PRN Reason: Protocol


   Stop: 01/08/19 00:10


   Last Admin: 01/07/19 18:24 Dose:  2,700 mls


Ondansetron HCl (Zofran)  4 mg IVP Q4 PRN


   PRN Reason: Nausea/Vomiting, Can't Take PO


   Stop: 07/06/19 19:03


   Last Admin: 01/07/19 19:07 Dose:  4 mg





Discontinued Medications





Ceftriaxone Sodium/Dextrose (Rocephin 1 Gm (Premix))  50 mls @ 100 mls/hr IV 

EDNOW ONE


   PRN Reason: Protocol


   Stop: 01/07/19 19:04


   Last Admin: 01/07/19 18:57 Dose:  50 mls








General


Initial Vital Signs: 


 Initial Vital Signs











Temperature (C)  38.0 C   01/07/19 17:40


 


Heart Rate  97   01/07/19 17:40


 


Respiratory Rate  16   01/07/19 17:40


 


Blood Pressure  170/90 H  01/07/19 17:40


 


O2 Sat (%)  94   01/07/19 17:40








 











O2 Delivery Mode               Nasal Cannula


 


O2 (L/minute)                  2














Allergies/Adverse Reactions: 


 





Penicillins Allergy (Unknown, Verified 01/07/19 17:43)


 








Home Medications: 














 Medication  Instructions  Recorded


 


Cholecalciferol Vit D3 [Vitamin D3 2,000 units PO DAILY 08/07/15





(*)]  


 


DULoxetine [Cymbalta 60 MG (*)] 60 mg PO HS 08/07/15


 


Lisinopril [Zestril 5 mg (*)] 5 mg PO DAILY 08/07/15


 


Multivitamins [Multivitamin (*)] 1 each PO DAILY@12 07/26/16


 


Pravastatin Sodium [Pravachol] 40 mg PO HS 07/26/16


 


Gabapentin [Neurontin 300 MG (*)] 600 mg PO HS 09/13/18


 


Levothyroxine [Synthroid 150 mcg 150 mcg PO DAILY06 09/13/18





(*)]  


 


Lidocaine 5% [Lidocaine 5% Oint] 1 angela TP HS 11/01/18


 


Aspirin [Aspirin 81mg (*)] 81 mg PO HS 01/07/19


 


Neuragen Pain Relief Cream 1 angela  HS PRN 01/07/19














Departure





- Departure


Disposition: [facility]


Condition: Good

## 2019-01-07 NOTE — CPEKG
Test Reason : OPEN

Blood Pressure : ***/*** mmHG

Vent. Rate : 099 BPM     Atrial Rate : 099 BPM

   P-R Int : 148 ms          QRS Dur : 092 ms

    QT Int : 342 ms       P-R-T Axes : 072 -58 060 degrees

   QTc Int : 439 ms

 

Sinus rhythm

Left anterior fascicular block

 

Confirmed by Ishaan Henderson ([id number]) on 1/7/2019 6:15:45 PM

 

Referred By:             Confirmed By:Ishaan Henderson

## 2019-01-08 LAB
PLATELET # BLD: (no result) 10^3/UL (ref 150–400)
PLATELET # BLD: 245 10^3/UL (ref 150–400)

## 2019-01-08 RX ADMIN — GABAPENTIN SCH MG: 300 CAPSULE ORAL at 00:15

## 2019-01-08 RX ADMIN — LISINOPRIL SCH MG: 5 TABLET ORAL at 09:21

## 2019-01-08 RX ADMIN — ACETAMINOPHEN PRN MG: 325 TABLET ORAL at 09:21

## 2019-01-08 RX ADMIN — LEVOTHYROXINE SODIUM SCH MCG: 150 TABLET ORAL at 06:15

## 2019-01-08 RX ADMIN — Medication SCH UNITS: at 09:21

## 2019-01-08 RX ADMIN — Medication SCH MG: at 22:39

## 2019-01-08 RX ADMIN — GABAPENTIN SCH MG: 300 CAPSULE ORAL at 22:39

## 2019-01-08 RX ADMIN — ASPIRIN 81 MG SCH MG: 81 TABLET ORAL at 22:39

## 2019-01-08 RX ADMIN — ACETAMINOPHEN PRN MG: 325 TABLET ORAL at 00:15

## 2019-01-08 RX ADMIN — THERA TABS SCH EACH: TAB at 15:26

## 2019-01-08 NOTE — HOSPPROG
Hospitalist Progress Note


Assessment/Plan: 


83y female with c/o fever and vomiting. First encounter, chart reviewed. 





Acute febrile illness of uncertain etiology


   -no fever since admission


   -vomiting raises the possibility of a gastroenteritis 


   -BC pending


   -wound cx pending


   -supportive care





Chronic leg wound


   -Ongoing wound healing on her left leg 


   -skin graft and multiple infectious complications following melanoma 

resection


   -Significant deconditioning and debility, unable to ambulate due to chronic 

issues in addition to her acute febrile illness


   -sees Dr Velazquez in outpt





Chronic paroxysmal atrial fibrillation 


   -currently in sinus rhythm





History of COPD and pulmonary hypertension


   - some chronic edema at baseline





Chronic blindness from macular degeneration


   -at baseline





Weakness


   -severe


   -PT/OT





Dispo


   -change to inpt


   -cont evaluation


   -unclear etiol


   -She is unsafe to be at home given her significant weakness, need for 

ongoing wound care, undiagnosed febrile illness

















Subjective: Asleep in chair. Very tired. No pain. Very weak.


Objective: 


 Vital Signs











Temp Pulse Resp BP Pulse Ox


 


 37.6 C   80   20   112/63   91 L


 


 01/08/19 10:45  01/08/19 10:45  01/08/19 10:45  01/08/19 10:45  01/08/19 10:45








 Microbiology











 01/07/19 21:15 Gram Stain - Final





 Leg - Swab 








 Laboratory Results





 01/08/19 07:20 





 01/08/19 06:05 





 











 01/07/19 01/08/19 01/09/19





 05:59 05:59 05:59


 


Intake Total  1150 


 


Output Total  125 


 


Balance  1025 








 











PT  14.2 SEC (12.0-15.0)   01/07/19  18:04    


 


INR  1.08  (0.83-1.16)   01/07/19  18:04    














- Physical Exam


Constitutional: not in pain, chronically ill appearing, obese


Eyes: PERRL, anicteric sclera, other (legally blind)


Ears, Nose, Mouth, Throat: moist mucous membranes, hearing normal, ears appear 

normal


Cardiovascular: regular rate and rhythym, edema, No JVD


Respiratory: no respiratory distress, no rales or rhonchi, reduced air movement


Gastrointestinal: normoactive bowel sounds, No tenderness, No ascites


Skin: warm, normal color, No mottled


Musculoskeletal: normal joint ROM, no joint effusions, generalized weakness


Neurologic: AAOx3


Psychiatric: not anxious, not encephalopathic, thought process linear





ICD10 Worksheet


Patient Problems: 


 Problems











Problem Status Onset


 


Weakness Acute  


 


Pneumonia Acute  


 


Bronchospasm Acute  


 


Severe sepsis Acute  


 


Wound infection Acute  


 


Elevated WBC count Acute  


 


Chronic obstructive pulmonary disease with acute exacerbation Acute  


 


Fever Acute

## 2019-01-08 NOTE — ASMTCMCOM
CM Note

 

CM Note                       

Notes:

Pt is a 84 y/o female admitted for sepsis, wound infection and a fever. Therapies have been ordered 


and awaiting recommendations. Wound care has been consulted. Needs are TBD at this time. CM to 

follow.







Plan: TBD

 

Date Signed:  01/08/2019 09:54 AM

Electronically Signed By:LETI Bryan

## 2019-01-08 NOTE — SOAPPROG
SOAP Progress Note


Assessment/Plan: 


Assessment


Kiley is well known to me.  S/P WLE malignant melanoma with skin graft.  Her 

wound has been improving greatly.  She was admitted due to fever and malaise.  

Kiley was sleeping when I went to evaluate her and recently had her dressing 

changed.  I saw a photo of the wound and there is no obvious infection.  I will 

see her again tomorrow




















Plan:





01/08/19 16:04





Objective: 





 Vital Signs











Temp Pulse Resp BP Pulse Ox


 


 37.6 C   80   20   112/63   91 L


 


 01/08/19 10:45  01/08/19 10:45  01/08/19 10:45  01/08/19 10:45  01/08/19 10:45








 Microbiology











 01/07/19 21:15 Gram Stain - Final





 Leg - Swab 








 Laboratory Results





 01/08/19 07:20 





 01/08/19 06:05 





 











 01/07/19 01/08/19 01/09/19





 05:59 05:59 05:59


 


Intake Total  1150 


 


Output Total  125 


 


Balance  1025 








 











PT  14.2 SEC (12.0-15.0)   01/07/19  18:04    


 


INR  1.08  (0.83-1.16)   01/07/19  18:04    














ICD10 Worksheet


Patient Problems: 


 Problems











Problem Status Onset


 


Bronchospasm Acute  


 


Chronic obstructive pulmonary disease with acute exacerbation Acute  


 


Elevated WBC count Acute  


 


Fever Acute  


 


Pneumonia Acute  


 


Severe sepsis Acute  


 


Weakness Acute  


 


Wound infection Acute

## 2019-01-08 NOTE — PDGENHP
History and Physical


History and Physical: 





CC:  Fever and vomiting





HISTORY:  This patient comes to the ER tonight for assessment of fever with 

temperature greater than 101 degrees at home, as well as weakness in a 

generalized fashion and some sense of lethargy.  She felt a little out of 

sorts.  There is no significant pain or discomfort anywhere.  She has no cough 

nasal or sinus congestion, sore throat, chest discomfort or dyspnea.  She did 

vomit once in the ER and had nausea with that but has not previously vomited.  

She has no abdominal pain and no change in bowel function.  She does have a 

slight headache with no neurologic symptoms.  She denies any new joint pains..





The patient has an ongoing wound in her left calf lateral aspect and has been 

getting home wound care nursing for that and visiting weekly with Dr. Ireland in 

clinic.  This started off as a resected melanoma that developed a wound 

infection requiring debridements and eventually skin graft.  It has been very 

slow process over numerous weeks but she is gradually healing it.  She sought 

her home wound care nurse yesterday and both  family and wound care nurse 

thought the wound looked like it was improving.  As I look at the wounds 

tonight with the patient's family they feel that it looks as good as it ever 

has in the last few weeks.





ROS:  A comprehensive 10 system review revealed no other significant findings








PAST MEDICAL HISTORY:


Ongoing wound care for slow healing wound left calf as described above


Melanoma left leg


MSSA and then MRSA cellulitis


COPD, pulmonary hypertension


Atrial fibrillation, diastolic CHF


Coronary disease with stents


Blindness due to macular degeneration


Peripheral neuropathy


Hypertension


Hyperlipidemia


Hypothyroidism





FAMILY MEDICAL HISTORY:


No pertinent illnesses and relatives that they are aware of





SOCIAL HISTORY:


 lives with her  at home.  The daughter is a part-time caretaker 

and also here with them at the bedside


No tobacco or alcohol


Blindness is a chronic issue for her





MEDICATIONS:


The patients list has been reconciled by our clinical pharmacist in the EMR.  I 

have reviewed the list and ordered appropriate medicines.





PHYSICAL EXAMINATION:


Vital Signs:  Temperature 38 degrees, mildly hypertensive otherwise normal 

vital signs


Cardiac Monitor:  Sinus





Examination:


General: alert, oriented, good mentation, relaxed


Skin: warm, dry, good color, no rash


HEENT: normal


Neck: no mass or jvd


Resps: relaxed


Lungs: clear breath sounds


Heart: regular, no murmur


Abdomen: soft, nondistended, nontender, +BS, no mass


Upper Extremities: normal


Lower Extremities:  The wound on her left leg looks better to me than I 

remember seeing it.  There is 1 tiny area that is draining a yellowish fluid 

but otherwise it is granulating and healing up nicely at this point.  I do not 

see anything that looks to me like cellulitis, abscess, wound necrosis or other 

complication at this time.  She does have pitting edema of both legs and feet 

which is chronic for her


No Bleeding or bruising


Neurologic: normal speech/language, normal CNs, no focal weakness


IV site: looks normal








LABORATORY DATA:


White blood cell count [id number] otherwise unremarkable CBC


Normal coagulation studies and chemistry panel


Normal lactic acid





RADIOLOGY STUDIES:


Single-view chest x-ray done in the ER and I reviewed the image which I think 

shows no evidence of any acute cardiopulmonary abnormality





12 LEAD EKG:


I reviewed tracing from the ER showing sinus rhythm otherwise normal EKG





ASSESSMENT:


* Acute febrile illness of uncertain etiology


   -vomiting raises the possibility of a gastroenteritis and we do have neuro 

virus in the community at this time


   -I do not think she has wound infection but will have Dr. Velazquez in the wound 

nurses look at this wound, her wound looks improving to me 


   without signs of current infection


* Ongoing wound healing on her left leg as described above after skin graft and 

multiple infectious complications following melanoma resection


* Significant deconditioning and debility, unable to ambulate due to chronic 

issues in addition to her acute febrile illness


* Chronic paroxysmal atrial fibrillation currently in sinus rhythm


* History of COPD and pulmonary hypertension, some chronic edema at baseline


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





She is unsafe to be at home given her significant weakness, need for ongoing 

wound care, undiagnosed febrile illness





PLANS:


* Will place on obstetrics but she may need to change to inpatient depending on 

what we find in terms of diagnosis and how long her fever and weakness:


* Will observe without antibiotics at this time is I find no definite evidence 

of a bacterial infection


* Blood cultures have been obtained in the ER


* She got a fair bit of IV fluid in the ER which I do not think she really needs

, will stop that now particularly given her chronic right-sided heart failure 

and diastolic left-sided heart failure issues


* Will have surgery and Wound Care evaluate her wound in the morning


* GI pathogen panel is ordered but she is not having any diarrhea at this time





I have reviewed the patient's past medical records as part of this assessment, 

including previous hospital admission records

## 2019-01-08 NOTE — PCMIDPN
Assessment/Plan: 





# Nonhealing wound LLE following melanoma removal: minimal residual wound w 

hypergranulation - appears quite good





# Fever, unclear source, LLE not clearly cellulitic, query portal of entry for 

bacteremia.  Also in DDx is viral syndrome w sore thorat.   In light of N/V on 

admission would check LFTs. no Abdominal pain therefore lipase not likely 

helpful.  Wound swabbed showing MRSA which could certainly be colonization but 

in light of fever will start vancomycin for concern of bacteremia with wound as 

portal 


--continue vancomycin but decrease dose slightly to 1.25 gm IV q12h


--check respiratory pathogen PCR





#chronic leukocytosis: difficult to interpret changes





#prior cellulitis due to GAS 10/18/18








Meds


vancomycin 1.5gm IV q12h 








Subjective: 





Fever and vomiting from assisted living yesterday, Tm 38 since admit.  Known to 

me relating to chronic LLE  wound  following melanoma revision, complicated by 

prior cellulitis due to GAS 10/18/18, eventually requiring STSG 11/1/18 to heal 

wound.  Wound was healing well, but wound cx was obtained at admit  Patient 

denies abdominal pain, diarrhea, any further vomiting, respiratory symptoms. 

She did describe transient sore throat that is better.  She feels flushed at 

hot at time of exam


Objective: 


 Vital Signs











Temp Pulse Resp BP Pulse Ox


 


 37.6 C / Tc 100.6  67   19   108/65   96 


 


 01/08/19 16:20  01/08/19 16:20  01/08/19 16:20  01/08/19 16:20  01/08/19 16:20








 Microbiology











 01/07/19 21:15 Gram Stain - Final





 Leg - Swab 








 Laboratory Results





 01/08/19 07:20 





 01/08/19 06:05 





 











 01/07/19 01/08/19 01/09/19





 05:59 05:59 05:59


 


Intake Total  1150 225


 


Output Total  125 


 


Balance  1025 225














- Physical Exam


General Appearance: alert, no apparent distress, obese


EENT: No scleral icterus, No thrush


Respiratory: other (decreased bs bases), No respiratory distress, No accessory 

muscle use


Neck: supple


Cardiac/Chest: regular rate, rhythm


Extremities: pedal edema, other (LLE linear wound L shin w hypergranulation 

tissue, pink skin surrounding but no clearly cellulitic)


Abdomen: normal bowel sounds, non-tender, soft, No peritoneal signs


Skin: diaphoresis, other (flushed), No rash


Neuro/Psych: alert, normal mood/affect, oriented x 3





- Time Spent With Patient


Time Spent with Patient: greater than 35 minutes


Time Spent with Patient: Greater than 35 minutes spent on this patients care, 

greater than 50% of time spent counseling, educating, and coordinating care 

regarding the above mentioned plan.





ICD10 Worksheet


Patient Problems: 


 Problems











Problem Status Onset


 


Bronchospasm Acute  


 


Chronic obstructive pulmonary disease with acute exacerbation Acute  


 


Elevated WBC count Acute  


 


Fever Acute  


 


Pneumonia Acute  


 


Severe sepsis Acute  


 


Weakness Acute  


 


Wound infection Acute

## 2019-01-08 NOTE — WOCRNPDOC
GENIE Advanced Assessment Note





- Skin Integrity Problem, Advanced Assess


  ** Left Lower Leg Surgical Wound/Incision


Dressing Type: Allevyn Life


Dressing Description: Intact, Shadowed


Closure Description: Not Approximated


Exudate Amount: Minimal


Exudate Color: Yellow, Reddish/Yellow


Exudate Characteristic(s): Serosanguinous


Integumentary Issue Intervention: Dressing Changed


Adolph Wound Tissue: Erythema, Scarred


Adolph Wound Swelling: None


Wound Bed Color: Pink


Wound Bed Constitution: Granulation Tissue, Hypergranulation (0gnn2dr)


Wound Edges: Attached, Well Defined


Site Measurement - Head-to-Toe Length X Width X Depth (cm): 8x7x0.3


Skin Integrity Problem Comment: This patient is well known to wound care. She's 

had a history of wound vac and graft to this site. Allevyn Life dressing 

removed. Wound has healed significantly since my last encounter with this 

patient. There are several scattered open areas, the largest is a [id number] area 

of hypergranulation tissue. Wound cleaned with NS and gauze. Skin prep applied 

to adolph-wound tissue and wound covered with Mepilex Lite dressing. Dr. Velazquez 

notified. Wound care will round again later this week.

## 2019-01-09 RX ADMIN — ACETAMINOPHEN PRN MG: 325 TABLET ORAL at 11:24

## 2019-01-09 RX ADMIN — ASPIRIN 81 MG SCH MG: 81 TABLET ORAL at 20:49

## 2019-01-09 RX ADMIN — LEVOTHYROXINE SODIUM SCH MCG: 150 TABLET ORAL at 06:02

## 2019-01-09 RX ADMIN — THERA TABS SCH EACH: TAB at 11:24

## 2019-01-09 RX ADMIN — Medication SCH MG: at 20:49

## 2019-01-09 RX ADMIN — PRAVASTATIN SODIUM SCH MG: 40 TABLET ORAL at 20:49

## 2019-01-09 RX ADMIN — Medication SCH UNITS: at 09:51

## 2019-01-09 RX ADMIN — GABAPENTIN SCH MG: 300 CAPSULE ORAL at 20:49

## 2019-01-09 RX ADMIN — LISINOPRIL SCH MG: 5 TABLET ORAL at 09:51

## 2019-01-09 RX ADMIN — PRAVASTATIN SODIUM SCH: 40 TABLET ORAL at 00:27

## 2019-01-09 NOTE — SOAPPROG
SOAP Progress Note


Assessment/Plan: 


Assessment


Kiley is well known to me.  S/P WLE malignant melanoma with skin graft.  Her 

wound has been improving greatly.  She was admitted due to fever and malaise.  





Wound is much improved.  Applied silver nitrate to hypergranulation tissue


Cancelled her appt with me tomorrow and rescheduled for 1/17/2019 at 10:30 am





Available as inpatient as needed.  Please call with any questions or concerns.





May shower without dressings.  Wear protective dressing foam or bordered 

dressing.  Elevate as possible





S: Complaining of sore throat

















Plan:





01/08/19 16:04





01/09/19 09:33





Objective: 





 Vital Signs











Temp Pulse Resp BP Pulse Ox


 


 36.4 C   56 L  18   127/75 H  96 


 


 01/09/19 08:00  01/09/19 08:00  01/09/19 08:00  01/09/19 08:00  01/09/19 08:00








 Microbiology











 01/08/19 20:00 Respiratory Panel (PCR) - Final





 Nasal, Sinus - Anaerobic Tube/Swab    No Organism Detected By Pcr








 











 01/08/19 01/09/19 01/10/19





 05:59 05:59 05:59


 


Intake Total  925 


 


Output Total  825 


 


Balance  100 








 











PT  14.2 SEC (12.0-15.0)   01/07/19  18:04    


 


INR  1.08  (0.83-1.16)   01/07/19  18:04    














ICD10 Worksheet


Patient Problems: 


 Problems











Problem Status Onset


 


Bronchospasm Acute  


 


Chronic obstructive pulmonary disease with acute exacerbation Acute  


 


Elevated WBC count Acute  


 


Fever Acute  


 


Pneumonia Acute  


 


Severe sepsis Acute  


 


Weakness Acute  


 


Wound infection Acute

## 2019-01-09 NOTE — PCMIDPN
Assessment/Plan: 





# Nonhealing wound LLE following melanoma removal: minimal residual wound w 

hypergranulation - appears quite good, no cellulitis





# Fever, vomiting: now resolved. Suspect gastroenteritis.  Resp PCR neg; c/o 

sore throat today. No cellulitis, no bacteremia


--dc vancomycin


--dc planning ok from ID perspective


--call ID for additional questions





#chronic leukocytosis: stable





#prior cellulitis due to GAS 10/18/18








Meds


vancomycin 1.25gm IV q12h , #2








Subjective: 





upset about having to go to SNF again


no abdominal pain


no n/v


Objective: 


 Vital Signs











Temp Pulse Resp BP Pulse Ox


 


 36.4 C   58 L  18   103/52 L  94 


 


 01/09/19 11:06  01/09/19 11:06  01/09/19 11:06  01/09/19 11:06  01/09/19 11:06








 Microbiology











 01/09/19 11:30 Gram Stain - Final





 Throat - Swab 


 


 01/08/19 20:00 Respiratory Panel (PCR) - Final





 Nasal, Sinus - Anaerobic Tube/Swab    No Organism Detected By Pcr








 











 01/08/19 01/09/19 01/10/19





 05:59 05:59 05:59


 


Intake Total  925 


 


Output Total  825 


 


Balance  100 














- Physical Exam


General Appearance: alert, no apparent distress


EENT: pale conjunctiva


Respiratory: No accessory muscle use


Extremities: other (LLE lateral wound linear wound, oozing a bit of blood, no 

erythema )


Skin: No rash


Neuro/Psych: alert, normal mood/affect, oriented x 3





- Time Spent With Patient


Time Spent with Patient: greater than 25 minutes (discussed need for SNF w 

patient,  and case management)


Time Spent with Patient: Greater than 25 minutes spent on this patients care, 

greater than 50% of time spent counseling, educating, and coordinating care 

regarding the above mentioned plan.





ICD10 Worksheet


Patient Problems: 


 Problems











Problem Status Onset


 


Bronchospasm Acute  


 


Chronic obstructive pulmonary disease with acute exacerbation Acute  


 


Elevated WBC count Acute  


 


Fever Acute  


 


Pneumonia Acute  


 


Severe sepsis Acute  


 


Weakness Acute  


 


Wound infection Acute

## 2019-01-09 NOTE — HOSPPROG
Hospitalist Progress Note


Assessment/Plan: 


83y female with c/o fever and vomiting. 





Acute febrile illness of uncertain etiology


   -no fever since admission


   -vomiting raises the possibility of a gastroenteritis 


   -BC NGTD


   -wound cx MRSA


   -ID consult, D/W Thea Lema


   -supportive care





Sore throat


   -swab ordered





Chronic leg wound


   -Ongoing wound healing on her left leg 


   -skin graft and multiple infectious complications following melanoma 

resection


   -Significant deconditioning and debility, unable to ambulate due to chronic 

issues in addition to her acute febrile illness


   -D/W Dr Velazquez





Chronic paroxysmal atrial fibrillation 


   -currently in sinus rhythm





History of COPD and pulmonary hypertension


   - some chronic edema at baseline





Chronic blindness from macular degeneration


   -at baseline





Weakness


   -severe


   -PT/OT


   -needs SNF





Dispo


   -cont evaluation


   -She is unsafe to be at home given her significant weakness, need for 

ongoing wound care, undiagnosed febrile illness


   -needs SNF

















Subjective: C/O sore throat. Feels better then yesterday.


Objective: 


 Vital Signs











Temp Pulse Resp BP Pulse Ox


 


 36.4 C   58 L  18   103/52 L  94 


 


 01/09/19 11:06  01/09/19 11:06  01/09/19 11:06  01/09/19 11:06  01/09/19 11:06








 Microbiology











 01/09/19 11:30 Gram Stain - Final





 Throat - Swab 


 


 01/08/19 20:00 Respiratory Panel (PCR) - Final





 Nasal, Sinus - Anaerobic Tube/Swab    No Organism Detected By Pcr








 











 01/08/19 01/09/19 01/10/19





 05:59 05:59 05:59


 


Intake Total  925 


 


Output Total  825 


 


Balance  100 








 











PT  14.2 SEC (12.0-15.0)   01/07/19  18:04    


 


INR  1.08  (0.83-1.16)   01/07/19  18:04    














- Physical Exam


Constitutional: appears nourished, chronically ill appearing, obese, 

uncomfortable


Eyes: PERRL, anicteric sclera, No EOMI


Ears, Nose, Mouth, Throat: moist mucous membranes, hearing normal, No oral 

thrush


Cardiovascular: regular rate and rhythym, No JVD, No edema


Respiratory: no respiratory distress, no rales or rhonchi, reduced air movement


Gastrointestinal: normoactive bowel sounds, No tenderness, No ascites


Skin: warm, normal color, erythema


Musculoskeletal: normal joint ROM, no joint effusions, generalized weakness


Neurologic: AAOx3


Psychiatric: not anxious, not encephalopathic, thought process linear





ICD10 Worksheet


Patient Problems: 


 Problems











Problem Status Onset


 


Weakness Acute  


 


Pneumonia Acute  


 


Bronchospasm Acute  


 


Severe sepsis Acute  


 


Wound infection Acute  


 


Elevated WBC count Acute  


 


Chronic obstructive pulmonary disease with acute exacerbation Acute  


 


Fever Acute

## 2019-01-09 NOTE — PDMN
Medical Necessity


Medical necessity: Change to inpt as of 1/8/18 @ 15:01 per MD order and MCG M-

160, Sepsis and Other Febrile Illness, without Focal Infection, A-3 days. 83 y/

o admitted w/acute febrile illness of uncertain etiology presenting w/ fever, 

vomiting, and significant weakness. Chronic leg wound following melanoma 

resection, recent skin graft and mult infectious complications. Upgraded to 

inpt status for + MRSA in wound culture (blood cultures pending), signif 

deconditioning/weakness, unable to ambulate, need for further workup, wound care

, PT/OT, ID consult. Other PMH includes COPD, pulm HTN, and chronic blindness.  

Est LOS>2MN for ongoing eval/management of above.

## 2019-01-10 VITALS — SYSTOLIC BLOOD PRESSURE: 114 MMHG | DIASTOLIC BLOOD PRESSURE: 65 MMHG

## 2019-01-10 RX ADMIN — LISINOPRIL SCH MG: 5 TABLET ORAL at 10:46

## 2019-01-10 RX ADMIN — LEVOTHYROXINE SODIUM SCH MCG: 150 TABLET ORAL at 09:05

## 2019-01-10 RX ADMIN — THERA TABS SCH: TAB at 16:24

## 2019-01-10 RX ADMIN — Medication SCH UNITS: at 10:47

## 2019-01-10 RX ADMIN — LEVOTHYROXINE SODIUM SCH: 150 TABLET ORAL at 05:50

## 2019-01-10 NOTE — ASMTDCNOTE
Case Management Discharge

 

Discharge Order Complete?     Answers:  Yes                                   

Patient to Obtain             Answers:  Other                         Notes:  [facility]

Medications                                                                   

Transportation Arranged       Answers:  [facility]                         

Transport will Pick (Date     01/10/2019 03:00 PM

& Time)                       

Case Management Transport     Answers:  Yes                                   

Form Complete                                                                 

Faxed Final Orders            Answers:  Yes                                   

Agency/Facility Transfer      Answers:  Yes                                   

Report Printed & Faxed to                                                     

Receiving Agency                                                              

Family Notified               Answers:  Yes                                   

Discharge Comments            

Notes:

D/w NP, final orders faxed. Ryan goodman  notified, RN to call report. 

 

Date Signed:  01/10/2019 12:58 PM

Electronically Signed By:Svitlana Garcia RN

## 2019-01-10 NOTE — PDIAF
- Diagnosis


Diagnosis: MRSA


Code Status: Full Code





- Medication Management


Discharge Medications: electronically signed and located in the Home Medication 

List.


PICC Care - Routine: N/A





- Orders


Services needed: Registered Nurse, Physical Therapy, Occupational Therapy


Isolation Type: Contact Isolation


Diet Recommendation: no restrictions on diet


Additional Instructions: 


May shower without dressings.  Wear protective dressing foam or bordered 

dressing.  Elevate as possible





- Follow Up Care


Current Providers and Referrals: 


Braden Hebert MD [Primary Care Provider] - As per Instructions


Shannan Velazquez MD [Medical Doctor] - 01/17/19 10:30 am

## 2019-01-10 NOTE — ASMTLACE
LACE

 

Length of stay for            Answers:  2 days                                

current admission                                                             

Acuity / Level of             Answers:  Yes                                   

Care: Did the patient                                                         

have an inpatient                                                             

admission?                                                                    

Comorbidities - select        Answers:  Chronic pulmonary disease             

all that apply                                                                

                                        Congestive heart failure              

                                        Other                         Notes:  Blindness; HTN; HLD; Sl
ow 

                                                                              healing wound

# of Emergency department     Answers:  5-8                                   

visits in the last 6                                                          

months                                                                        

Score: 14

 

Date Signed:  01/10/2019 12:47 PM

Electronically Signed By:Svitlana Garcia RN

## 2019-01-10 NOTE — GDS
DISCHARGE DIAGNOSES:  

1.  Fever.

2.  Weakness.

3.  Chronic leg wounds.

4.  Chronic paroxysmal atrial fibrillation.

5.  Blindness with macular degeneration.



CONSULTATIONS:  Infectious Disease.



PHYSICAL EXAM:  GENERAL:  The patient is alert.  VITAL SIGNS:  Afebrile at 36.4, pulse 63, respirator
y rate is 18, blood pressure is 114/65, she is saturating 94% on 2 L.  I have seen and evaluated the 
patient on the day of discharge.



HOSPITAL COURSE:  Patient is an 83-year-old female who presents to the hospital with complaints of fe
deepa with vomiting and weakness.  She was evaluated and diagnosed with:

1.  Fever:  This is likely in the setting of a viral infection.  The patient's symptoms have signific
antly improved.  Her fever has resolved.

2.  Sore throat:  This is resolved with no intervention warranted.

3.  Chronic leg wound:  The wound looks significantly better.  She was seen by Dr. Velazquez during this 
hospitalization.  Culture demonstrated MRSA, which was felt to be colonization.  She is not being elizabeth
ated with antibiotic therapy at this time.

4.  Chronic paroxysmal atrial fibrillation:  She is currently in sinus and stable.

5.  History of COPD:  This is stable, close to baseline.

6.  Blindness with macular degeneration:  She is at her baseline.

7.  Weakness:  Patient requires skilled nursing rehabilitation secondary to her deconditioning.



DISPOSITION:  She will be discharged to skilled nursing for further strengthening and conditioning.



DISCHARGE MEDICATIONS:  Please refer to the EMR form.  I have not provided any prescriptions for the 
patient at the time of disposition or new medications.



FOLLOWUP:  Followup will be with Dr. Velazquez for her ongoing lower extremity wound, as well as Dr. Rakesh sherman, her primary care physician.



TIME SPENT WITH PATIENT:  I spent greater than 35 minutes in the care, coordination, and management o
f patient's disposition.





Job #:  [id number]

## 2019-01-10 NOTE — ASMTCMCOM
CM Note

 

TATE Note                       

Notes:

TATE,MD, and RN met with pt to discuss dc poc. PT/OT recommend SNF, pt is resistant but unable to get 


out of bed by herself as witnessed by us. Pt and  reluctantly agree to go to [facility] 

where pt has been before.



CM spoke w/Dexter at  and she will put in for insurance authorization, pt is likely in copay 

days.



DC Plan: SNF/

 

Date Signed:  01/10/2019 09:50 AM

Electronically Signed By:Svitlana Garcia RN

## 2019-01-11 NOTE — ASDISCHSUM
----------------------------------------------

Discharge Information

----------------------------------------------

Plan Status:SNF                                      Medically Cleared to Leave:

Discharge Date:01/10/2019 02:54 PM                   CM D/C Disposition:Skilled Nursing Facility

ADT D/C Disposition:Skilled Nursing Facility         Projected Discharge Date:01/10/2019 11:00 AM

Transportation at D/C:ALS/BLS                        Discharge Delay Reason:

Follow-Up Date:01/10/2019 11:00 AM                   Discharge Slot:

Final Diagnosis:

----------------------------------------------

Placement Information

----------------------------------------------

Referral Type:*Nursing Home/SNF                      Referral ID:[id number]

Provider Name:[facility]

Address 1:[address]                             Phone [phone]

Address 2:                                           Fax [phone]

City:[place]                                         Selection Factors:

State:[place]

 

----------------------------------------------

Patient Contact Information

----------------------------------------------

Contact Name:VITOR                           Relationship:

Address:[address] [place] [address]                             Home [phone]

                                                     Work [phone]

City:[place]                                         Alternate Phone:

State/Zip Code:[place] [address]                              Email:

----------------------------------------------

Financial Information

----------------------------------------------

Financial Class:Medicare Advantage Plans

Primary Plan Desc:[employer]         Primary Plan Number:[id number]

Secondary Plan Desc:                                 Secondary Plan Number:

 

 

----------------------------------------------

Assessment Information

----------------------------------------------

----------------------------------------------

[facility] CM Progress Note

----------------------------------------------

CM Note

 

CM Note                       

Notes:

Pt is a 82 y/o female admitted for sepsis, wound infection and a fever. Therapies have been ordered 


and awaiting recommendations. Wound care has been consulted. Needs are TBD at this time. CM to 

follow.







Plan: TBD

 

Date Signed:  01/08/2019 09:54 AM

Electronically Signed By:LETI Bryan

 

 

----------------------------------------------

LACE

----------------------------------------------

AIDAN

 

Length of stay for            Answers:  2 days                                

current admission                                                             

Acuity / Level of             Answers:  Yes                                   

Care: Did the patient                                                         

have an inpatient                                                             

admission?                                                                    

Comorbidities - select        Answers:  Chronic pulmonary disease             

all that apply                                                                

                                        Congestive heart failure              

                                        Other                         Notes:  Blindness; HTN; HLD; Sl
ow 

                                                                              healing wound

# of Emergency department     Answers:  5-8                                   

visits in the last 6                                                          

months                                                                        

Score: 14

 

Date Signed:  01/10/2019 12:47 PM

Electronically Signed By:Svitlana Garcia RN

 

 

----------------------------------------------

[facility] CM Progress Note

----------------------------------------------

CM Note

 

CM Note                       

Notes:

TATE,MD, and RN met with pt to discuss dc poc. PT/OT recommend SNF, pt is resistant but unable to get 


out of bed by herself as witnessed by us. Pt and  reluctantly agree to go to [facility] 

where pt has been before.



TATE spoke amol/Dexter at  and she will put in for insurance authorization, pt is likely in copay 

days.



DC Plan: SNF/

 

Date Signed:  01/10/2019 09:50 AM

Electronically Signed By:Svitlana Gacria RN

 

 

----------------------------------------------

Case Management Discharge Plan Note

----------------------------------------------

Case Management Discharge

 

Discharge Order Complete?     Answers:  Yes                                   

Patient to Obtain             Answers:  Other                         Notes:  [facility]

Medications                                                                   

Transportation Arranged       Answers:  [facility]                         

Transport will Pick (Date     01/10/2019 03:00 PM

& Time)                       

Case Management Transport     Answers:  Yes                                   

Form Complete                                                                 

Faxed Final Orders            Answers:  Yes                                   

Agency/Facility Transfer      Answers:  Yes                                   

Report Printed & Faxed to                                                     

Receiving Agency                                                              

Family Notified               Answers:  Yes                                   

Discharge Comments            

Notes:

D/w NP, final orders faxed. Ryan at  notified, SCOTT to call report. 

 

Date Signed:  01/10/2019 12:58 PM

Electronically Signed By:Svitlana Garcia RN

 

 

----------------------------------------------

Intervention Information

----------------------------------------------

## 2019-01-23 ENCOUNTER — HOSPITAL ENCOUNTER (OUTPATIENT)
Dept: HOSPITAL 80 - FED | Age: 84
Setting detail: OBSERVATION
LOS: 2 days | Discharge: NURSING, REHAB OR LONG-TERM CARE | End: 2019-01-25
Attending: INTERNAL MEDICINE | Admitting: INTERNAL MEDICINE
Payer: COMMERCIAL

## 2019-01-23 DIAGNOSIS — E11.40: ICD-10-CM

## 2019-01-23 DIAGNOSIS — T81.41XA: Primary | ICD-10-CM

## 2019-01-23 DIAGNOSIS — I10: ICD-10-CM

## 2019-01-23 DIAGNOSIS — Z96.659: ICD-10-CM

## 2019-01-23 DIAGNOSIS — E66.01: ICD-10-CM

## 2019-01-23 DIAGNOSIS — I48.0: ICD-10-CM

## 2019-01-23 DIAGNOSIS — Z95.5: ICD-10-CM

## 2019-01-23 DIAGNOSIS — I25.10: ICD-10-CM

## 2019-01-23 DIAGNOSIS — C43.9: ICD-10-CM

## 2019-01-23 DIAGNOSIS — B95.62: ICD-10-CM

## 2019-01-23 DIAGNOSIS — H35.30: ICD-10-CM

## 2019-01-23 DIAGNOSIS — Z87.891: ICD-10-CM

## 2019-01-23 DIAGNOSIS — E78.5: ICD-10-CM

## 2019-01-23 DIAGNOSIS — E03.9: ICD-10-CM

## 2019-01-23 DIAGNOSIS — J44.9: ICD-10-CM

## 2019-01-23 DIAGNOSIS — L03.116: ICD-10-CM

## 2019-01-23 LAB
INR PPP: 1.13 (ref 0.83–1.16)
PLATELET # BLD: 340 10^3/UL (ref 150–400)
PROTHROMBIN TIME: 14.7 SEC (ref 12–15)

## 2019-01-23 PROCEDURE — 99285 EMERGENCY DEPT VISIT HI MDM: CPT

## 2019-01-23 PROCEDURE — 97161 PT EVAL LOW COMPLEX 20 MIN: CPT

## 2019-01-23 PROCEDURE — 97166 OT EVAL MOD COMPLEX 45 MIN: CPT

## 2019-01-23 PROCEDURE — G0378 HOSPITAL OBSERVATION PER HR: HCPCS

## 2019-01-23 PROCEDURE — 97535 SELF CARE MNGMENT TRAINING: CPT

## 2019-01-23 RX ADMIN — VANCOMYCIN HYDROCHLORIDE SCH MLS: 1 INJECTION, POWDER, LYOPHILIZED, FOR SOLUTION INTRAVENOUS at 22:26

## 2019-01-23 RX ADMIN — HYDROCODONE BITARTRATE AND ACETAMINOPHEN PRN TAB: 5; 325 TABLET ORAL at 22:26

## 2019-01-23 NOTE — EDPHY
H & P


Stated Complaint: wound, L leg


Time Seen by Provider: 01/23/19 15:30


HPI/ROS: 





CHIEF COMPLAINT:  Wound infection





HISTORY OF PRESENT ILLNESS:  Patient is an 83-year-old female with a history of 

melanoma resected from her left shin with skin graft.  She also has history of 

diabetes and atrial fibrillation and peripheral neuropathy.  She was 

hospitalized 2 weeks ago for febrile illness that was initially thought to be 

due to wound infection and sepsis however discharge him reports that it was of 

likely a viral infection and that the chronic leg wound was improving.  It did 

culture positive for MRSA this was felt to be colonization.  She was not 

receiving antibiotics.  The patient's wound care nurse who comes 3 times a week 

came to her home today and was concerned about the appearance of the wound and 

sent a picture to Dr. Shannan Velazquez who requested the patient be brought to the 

ER for wound infection.  She has not had a fever.  No GI symptoms.  The patient 

has a history of macular degeneration blindness and cannot tell with the wound 

looks like.


Severity:  Moderate


Modifying factors:  None





REVIEW OF SYSTEMS:


Constitutional:  denies: chills, fever, recent illness, recent injury


EENTM: denies: blurred vision, double vision, nose congestion


Respiratory: denies: cough, shortness of breath


Cardiac: denies: chest pain, irregular heart rate, lightheadedness, palpitations


Gastrointestinal/Abdominal: denies: abdominal pain, diarrhea, nausea, vomiting, 

blood streaked stools


Genitourinary: denies: dysuria, frequency, hematuria, pain


Musculoskeletal: denies: joint pain, muscle pain


Skin:  See HPI


Neurological: denies: headache, numbness, paresthesia, tingling, dizziness, 

weakness


Hematologic/Lymphatic: denies: blood clots, easy bleeding, easy bruising


Immunologic/allergic: denies: HIV/AIDS, transplant


 10 systems reviewed and negative except as noted





EXAM:


GENERAL:  Well-appearing, well-nourished and in no acute distress.


HEAD:  Atraumatic, normocephalic.


EYES:  Pupils equal round and reactive to light, extraocular movements intact, 

sclera anicteric, conjunctiva are normal.


ENT:  TMs normal, nares patent, oropharynx clear without exudates.  Moist 

mucous membranes.


NECK:  Normal range of motion, supple without lymphadenopathy or JVD.


LUNGS:  Breath sounds clear to auscultation bilaterally and equal.  No wheezes 

rales or rhonchi.


HEART:  Irregular without murmurs, rubs or gallops.


ABDOMEN:  Soft, nontender, normoactive bowel sounds.  No guarding, no rebound.  

No masses appreciated. 


BACK:  No CVA tenderness, no spinal tenderness, step-offs or deformities


EXTREMITIES:  Normal range of motion, no pitting or edema.  No clubbing or 

cyanosis.


NEUROLOGICAL:  Cranial nerves II through XII grossly intact.  Normal speech, 

normal gait.  5/5 strength, normal movement in all extremities, normal sensation

, normal reflexes


PSYCH:  Normal mood, normal affect.


SKIN:  Chronic healing wound to left shin, small amount of bleeding and skin 

tear.  No obvious purulence.  Minimal erythema age indeterminate.  Not Warm to 

touch.








Source: Patient


Exam Limitations: No limitations





- Personal History


Tetanus Vaccine Date: <10 years





- Medical/Surgical History


Hx Asthma: No


Hx Chronic Respiratory Disease: Yes


Hx Diabetes: No


Hx Cardiac Disease: Yes


Hx Renal Disease: No


Hx Cirrhosis: No


Hx Alcoholism: No


Hx HIV/AIDS: No


Hx Splenectomy or Spleen Trauma: No


Other PMH: HLD, HTN, hypothyroid, cardiac stent, L shin melanoma s/p resection 

and split thickness skin graft taken from L thigh, peripheral neuropathy, 

legally blind





- Social History


Smoking Status: Former smoker


Constitutional: 


 Initial Vital Signs











Temperature (C)  36.7 C   01/23/19 15:32


 


Heart Rate  67   01/23/19 15:32


 


Respiratory Rate  16   01/23/19 15:32


 


Blood Pressure  108/70   01/23/19 15:32


 


O2 Sat (%)  92   01/23/19 15:32








 











O2 Delivery Mode               Room Air














Allergies/Adverse Reactions: 


 





Penicillins Allergy (Unknown, Verified 01/23/19 15:36)


 








Home Medications: 














 Medication  Instructions  Recorded


 


Cholecalciferol Vit D3 [Vitamin D3 2,000 units PO DAILY 08/07/15





(*)]  


 


DULoxetine [Cymbalta 60 MG (*)] 60 mg PO HS 08/07/15


 


Lisinopril [Zestril 5 mg (*)] 5 mg PO DAILY 08/07/15


 


Multivitamins [Multivitamin (*)] 1 each PO DAILY@12 07/26/16


 


Pravastatin Sodium [Pravachol] 40 mg PO HS 07/26/16


 


Gabapentin [Neurontin 300 MG (*)] 600 mg PO HS 09/13/18


 


Levothyroxine [Synthroid 150 mcg 150 mcg PO DAILY06 09/13/18





(*)]  


 


Lidocaine 5% [Lidocaine 5% Oint] 1 angela TP HS 11/01/18


 


Aspirin [Aspirin 81mg (*)] 81 mg PO HS 01/07/19


 


Neuragen Pain Relief Cream 1 angela TP HS PRN 01/07/19


 


Acetaminophen [Tylenol 325mg (*)] 650 mg PO Q6 PRN  tab 01/10/19


 


Melatonin [Melatonin 3 MG (*)] 3 mg PO HS  tab 01/10/19














Medical Decision Making


ED Course/Re-evaluation: 





4:20 p.m. I discussed the case Dr. Shannan Velazquez who will come to evaluate.  She 

states that last week it looked very well and there was no erythema.





5:30 p.m. Dr. Miller has evaluated the patient and feels that the wound is 

infected.  She requests antibiotics.  She and I discussed this with Dr. Louis 

who will admit.


Differential Diagnosis: 





Partial list of the Differential diagnosis considered include but were not 

limited to;  wound infection, cellulitis and although unlikely based on the 

history and physical exam, I also considered sepsis, osteomyelitis.  





- Data Points


Laboratory Results: 


 Laboratory Results





 01/23/19 16:15 





 01/23/19 16:15 





 











  01/23/19 01/23/19 01/23/19





  16:30 16:15 16:15


 


WBC      





    


 


RBC      





    


 


Hgb      





    


 


Hct      





    


 


MCV      





    


 


MCH      





    


 


MCHC      





    


 


RDW      





    


 


Plt Count      





    


 


MPV      





    


 


Neut % (Auto)      





    


 


Lymph % (Auto)      





    


 


Mono % (Auto)      





    


 


Eos % (Auto)      





    


 


Baso % (Auto)      





    


 


Nucleat RBC Rel Count      





    


 


Absolute Neuts (auto)      





    


 


Absolute Lymphs (auto)      





    


 


Absolute Monos (auto)      





    


 


Absolute Eos (auto)      





    


 


Absolute Basos (auto)      





    


 


Absolute Nucleated RBC      





    


 


Immature Gran %      





    


 


Immature Gran #      





    


 


PT      14.7 SEC SEC





     (12.0-15.0) 


 


INR      1.13 





     (0.83-1.16) 


 


APTT      28.3 SEC SEC





     (23.0-38.0) 


 


VBG Lactic Acid  1.8 mmol/L mmol/L    





   (0.7-2.1)   


 


Sodium    139 mEq/L mEq/L  





    (135-145)  


 


Potassium    4.3 mEq/L mEq/L  





    (3.5-5.2)  


 


Chloride    102 mEq/L mEq/L  





    ()  


 


Carbon Dioxide    29 mEq/l mEq/l  





    (22-31)  


 


Anion Gap    8 mEq/L mEq/L  





    (6-14)  


 


BUN    21 mg/dL mg/dL  





    (7-23)  


 


Creatinine    1.0 mg/dL mg/dL  





    (0.6-1.0)  


 


Estimated GFR    53   





    


 


Glucose    113 mg/dL H mg/dL  





    ()  


 


Calcium    9.1 mg/dL mg/dL  





    (8.5-10.4)  


 


Total Bilirubin    0.5 mg/dL mg/dL  





    (0.1-1.4)  














  01/23/19





  16:15


 


WBC  8.81 10^3/uL 10^3/uL





   (3.80-9.50) 


 


RBC  5.06 10^6/uL 10^6/uL





   (4.18-5.33) 


 


Hgb  15.1 g/dL g/dL





   (12.6-16.3) 


 


Hct  47.0 % %





   (38.0-47.0) 


 


MCV  92.9 fL fL





   (81.5-99.8) 


 


MCH  29.8 pg pg





   (27.9-34.1) 


 


MCHC  32.1 g/dL L g/dL





   (32.4-36.7) 


 


RDW  14.6 % %





   (11.5-15.2) 


 


Plt Count  340 10^3/uL 10^3/uL





   (150-400) 


 


MPV  8.4 fL L fL





   (8.7-11.7) 


 


Neut % (Auto)  55.1 % %





   (39.3-74.2) 


 


Lymph % (Auto)  31.8 % %





   (15.0-45.0) 


 


Mono % (Auto)  7.5 % %





   (4.5-13.0) 


 


Eos % (Auto)  4.5 % %





   (0.6-7.6) 


 


Baso % (Auto)  0.8 % %





   (0.3-1.7) 


 


Nucleat RBC Rel Count  0.0 % %





   (0.0-0.2) 


 


Absolute Neuts (auto)  4.85 10^3/uL 10^3/uL





   (1.70-6.50) 


 


Absolute Lymphs (auto)  2.80 10^3/uL 10^3/uL





   (1.00-3.00) 


 


Absolute Monos (auto)  0.66 10^3/uL 10^3/uL





   (0.30-0.80) 


 


Absolute Eos (auto)  0.40 10^3/uL 10^3/uL





   (0.03-0.40) 


 


Absolute Basos (auto)  0.07 10^3/uL 10^3/uL





   (0.02-0.10) 


 


Absolute Nucleated RBC  0.00 10^3/uL 10^3/uL





   (0-0.01) 


 


Immature Gran %  0.3 % %





   (0.0-1.1) 


 


Immature Gran #  0.03 10^3/uL 10^3/uL





   (0.00-0.10) 


 


PT  





  


 


INR  





  


 


APTT  





  


 


VBG Lactic Acid  





  


 


Sodium  





  


 


Potassium  





  


 


Chloride  





  


 


Carbon Dioxide  





  


 


Anion Gap  





  


 


BUN  





  


 


Creatinine  





  


 


Estimated GFR  





  


 


Glucose  





  


 


Calcium  





  


 


Total Bilirubin  





  














Departure





- Departure


Disposition: [facility] Inpatient Acute


Clinical Impression: 


 Wound infection





Condition: Fair

## 2019-01-23 NOTE — PDGENHP
History and Physical





- Chief Complaint


wound looks bad





- History of Present Illness


82 yo F w/ hx of malignant melanoma as well as MRSA infection of the wound as 

well as COPD, a fib and blindness due to macular degeneration presenting on the 

recommendation of her home care nurse for concerns that her wound appears more 

red. Dr. Velazquez follows patient in wound clinic and on evaluation of the wound 

in ER agrees that this wound is more red and is concerning for early signs of 

infection. Patient herself has no complaints other than needing to stay in the 

hospital overnight. Of note, her  suffered a near syncopal episode while 

in the ER and also got hospitalized tonight. 





History Information





- Allergies/Home Medication List


Allergies/Adverse Reactions: 








Penicillins Allergy (Unknown, Verified 01/23/19 15:36)


 





Home Medications: 








Cholecalciferol Vit D3 [Vitamin D3 (*)] 2,000 units PO DAILY 08/07/15 [Last 

Taken 01/23/19]


DULoxetine [Cymbalta 60 MG (*)] 60 mg PO HS 08/07/15 [Last Taken 01/22/19]


Lisinopril [Zestril 5 mg (*)] 5 mg PO DAILY 08/07/15 [Last Taken 01/23/19]


Multivitamins [Multivitamin (*)] 1 each PO DAILY@12 07/26/16 [Last Taken 01/23/ 19]


Pravastatin Sodium [Pravachol] 40 mg PO HS 07/26/16 [Last Taken 01/22/19]


Gabapentin [Neurontin 300 MG (*)] 600 mg PO HS 09/13/18 [Last Taken 01/22/19]


Levothyroxine [Synthroid 150 mcg (*)] 150 mcg PO DAILY06 09/13/18 [Last Taken 01 /23/19]


Lidocaine 5% [Lidocaine 5% Oint] 1 angela TP HS 11/01/18 [Last Taken 01/06/19]


Aspirin [Aspirin 81mg (*)] 81 mg PO HS 01/07/19 [Last Taken 01/22/19]


Neuragen Pain Relief Cream 1 angela TP HS PRN 01/07/19 [Last Taken 01/22/19]





I have personally reviewed and updated: family history, medical history, social 

history, surgical history





- Past Medical History


atrial fibrillation, coronary artery disease (s/p stent to LAD), cancer (

melanoma), COPD, hypertension, hyperlipidemia, osteoporosis (with L1 

compression fx)


Additional medical history: moderate to severe pulm htn.  shaina/ohs.  morbid 

obesity with BMI of 34.  peripheral neuropathy.  macular degeneration/

retinopathy with only 10% of vision.  hypothyroid.  gait instability.  

nephrolithiasis





- Surgical History


Reports: coronary stent


Additional surgical history: TKA.  melanoma excision





- Family History


Positive for: CAD (mother w/cabg at age 55)





- Social History


Smoking Status: Former smoker


Alcohol Use: None


Drug Use: None


Additional social history: , lives in assisted living





Review of Systems


Review of Systems: 





ROS: 10pt was reviewed & negative except for what was stated in HPI & below





Physical Exam


Physical Exam: 

















Temp Pulse Resp BP Pulse Ox


 


 36.6 C   64   18   111/75   92 


 


 01/23/19 20:00  01/23/19 20:00  01/23/19 20:00  01/23/19 20:00  01/23/19 20:00











Constitutional: no apparent distress, chronically ill appearing


Eyes: PERRL, anicteric sclera


Ears, Nose, Mouth, Throat: moist mucous membranes, hearing normal


Cardiovascular: regular rate and rhythym, no murmur, rub, or gallop, edema


Respiratory: no respiratory distress, no rales or rhonchi


Gastrointestinal: normoactive bowel sounds, soft, non-tender abdomen


Genitourinary: no bladder tenderness


Skin: warm, erythema, other (wound on left lower leg weeping and erythematous)


Musculoskeletal: no muscle tenderness


Neurologic: AAOx3


Psychiatric: interacting appropriately, not anxious, not encephalopathic





Lab Data & Imaging Review





 01/23/19 16:15





 01/23/19 16:15














WBC  8.81 10^3/uL (3.80-9.50)   01/23/19  16:15    


 


RBC  5.06 10^6/uL (4.18-5.33)   01/23/19  16:15    


 


Hgb  15.1 g/dL (12.6-16.3)   01/23/19  16:15    


 


Hct  47.0 % (38.0-47.0)   01/23/19  16:15    


 


MCV  92.9 fL (81.5-99.8)   01/23/19  16:15    


 


MCH  29.8 pg (27.9-34.1)   01/23/19  16:15    


 


MCHC  32.1 g/dL (32.4-36.7)  L  01/23/19  16:15    


 


RDW  14.6 % (11.5-15.2)   01/23/19  16:15    


 


Plt Count  340 10^3/uL (150-400)   01/23/19  16:15    


 


MPV  8.4 fL (8.7-11.7)  L  01/23/19  16:15    


 


Neut % (Auto)  55.1 % (39.3-74.2)   01/23/19  16:15    


 


Lymph % (Auto)  31.8 % (15.0-45.0)   01/23/19  16:15    


 


Mono % (Auto)  7.5 % (4.5-13.0)   01/23/19  16:15    


 


Eos % (Auto)  4.5 % (0.6-7.6)   01/23/19  16:15    


 


Baso % (Auto)  0.8 % (0.3-1.7)   01/23/19  16:15    


 


Nucleat RBC Rel Count  0.0 % (0.0-0.2)   01/23/19  16:15    


 


Absolute Neuts (auto)  4.85 10^3/uL (1.70-6.50)   01/23/19  16:15    


 


Absolute Lymphs (auto)  2.80 10^3/uL (1.00-3.00)   01/23/19  16:15    


 


Absolute Monos (auto)  0.66 10^3/uL (0.30-0.80)   01/23/19  16:15    


 


Absolute Eos (auto)  0.40 10^3/uL (0.03-0.40)   01/23/19  16:15    


 


Absolute Basos (auto)  0.07 10^3/uL (0.02-0.10)   01/23/19  16:15    


 


Absolute Nucleated RBC  0.00 10^3/uL (0-0.01)   01/23/19  16:15    


 


Immature Gran %  0.3 % (0.0-1.1)   01/23/19  16:15    


 


Immature Gran #  0.03 10^3/uL (0.00-0.10)   01/23/19  16:15    


 


PT  14.7 SEC (12.0-15.0)   01/23/19  16:15    


 


INR  1.13  (0.83-1.16)   01/23/19  16:15    


 


APTT  28.3 SEC (23.0-38.0)   01/23/19  16:15    


 


VBG Lactic Acid  1.8 mmol/L (0.7-2.1)   01/23/19  16:30    


 


Sodium  139 mEq/L (135-145)   01/23/19  16:15    


 


Potassium  4.3 mEq/L (3.5-5.2)   01/23/19  16:15    


 


Chloride  102 mEq/L ()   01/23/19  16:15    


 


Carbon Dioxide  29 mEq/l (22-31)   01/23/19  16:15    


 


Anion Gap  8 mEq/L (6-14)   01/23/19  16:15    


 


BUN  21 mg/dL (7-23)   01/23/19  16:15    


 


Creatinine  1.0 mg/dL (0.6-1.0)   01/23/19  16:15    


 


Estimated GFR  53   01/23/19  16:15    


 


Glucose  113 mg/dL ()  H  01/23/19  16:15    


 


Calcium  9.1 mg/dL (8.5-10.4)   01/23/19  16:15    


 


Total Bilirubin  0.5 mg/dL (0.1-1.4)   01/23/19  16:15    











Assessment & Plan


Assessment: 








Wound infection (Acute)





82 yo F with MMI including malignant melanoma s/p wide excision and skin graft 

with recurrent cellulitis 





# LLE cellulitis: in the setting of chronic lower extremity wound s/p melanoma 

excision and wound grafting, hx of MRSA, appreciate surgery evaluation--they do 

not feel surgical intervention needed but do feel that this is worsening 

infection that needs monitoring overnight, started on vanco, wound care consult


# malignant melanoma: s/p wide excision as above, wound as above


# copd: without e/o acute exacerbation


# a fib, paroxysmal: appears to be in SR currently, resume asa, not on any rate 

control chronically


# CAD: hx of stents, no c/o cp currently, resume home meds including asa, statin


# HTN: will resume lisinopril


# HLD: continue statin


# hypothyroid: continue levothyroxine


# Inpatient status, given multiple active issues and high risk cellulitis in 

setting of prior skin graft will need > 48 hrs


Patient new to my care. Old records reviewed and summarized as above. Care plan 

reviewed with Dr. Velazquez as above.

## 2019-01-24 LAB — PLATELET # BLD: 300 10^3/UL (ref 150–400)

## 2019-01-24 RX ADMIN — ENOXAPARIN SODIUM SCH MG: 100 INJECTION SUBCUTANEOUS at 09:04

## 2019-01-24 RX ADMIN — HYDROCODONE BITARTRATE AND ACETAMINOPHEN PRN TAB: 5; 325 TABLET ORAL at 22:36

## 2019-01-24 RX ADMIN — VANCOMYCIN HYDROCHLORIDE SCH MLS: 1 INJECTION, POWDER, LYOPHILIZED, FOR SOLUTION INTRAVENOUS at 22:19

## 2019-01-24 NOTE — GCON
DATE OF CONSULTATION:  01/23/2019



CHIEF COMPLAINT:  Left lower extremity cellulitis.



HISTORY OF PRESENT ILLNESS:  The patient is an 83-year-old woman well known to me.  She initially had
 a malignant melanoma on her left lower extremity, which I performed wide local excision split-thickn
ess skin graft.  She developed cellulitis postoperatively.  She was admitted to the hospital for that
.  She ultimately improved and then I was able to perform split-thickness skin graft.  Again, she had
 been admitted for cellulitis.  Since I have been seeing her regularly, she had minimal graft loss on
 her leg.  She has been much improved from when I last saw her.  I was applying silver nitrate to a v
cecy thin strip of hypergranulation tissue.  We were called by her home care nurse as it was the first
 time she was seeing her, and the area was more erythematous.



PAST MEDICAL HISTORY:  Depression, hypertension, blindness, neuropathy, hypothyroid, atrial fibrillat
ion, COPD.



ALLERGIES:  Penicillin.



SURGICAL HISTORY:  Stents, total knee replacement.



SOCIAL HISTORY:  Former smoker.  She lives at [facility] and is .



REVIEW OF SYSTEMS:  No fevers.



PHYSICAL EXAM:  VITALS:  Reviewed.  She is more hypotensive than usual with her systolic in the 80s. 
 GENERAL:  Pleasant, obese woman sitting on gurney.  Daughter at bedside.  HEENT:  Normocephalic, atr
aumatic.  No scleral icterus.  She is blind.  No gross hearing deficits.  Mucous membranes moist.  MELLO
NGS:  No increased work of breathing.  CARDIAC:  No peripheral edema.  SKIN:  On her left lower extre
mity, she does have more edema, and there are several punctate areas where the skin has been denuded.
  There is faint erythema that is warm to the touch.  The hypergranulation is less prominent.



IMPRESSION AND PLAN:  The patient is an 83-year-old woman, status post wide local excision melanoma, 
who has multiple comorbidities and has a low-grade cellulitis.  Due to these previously getting out o
f control, I recommend admission, IV antibiotics, and as she improves, I hope this will resolve.  I w
ill continue to follow.  I am not sure why the skin broke open.  She could have become edematous.  Sheree worthington did say that she was sick this week with vomiting and diarrhea.  Appreciate the hospitalist.





Job #:  [id number]

## 2019-01-24 NOTE — SOAPPROG
SOAP Progress Note


Assessment/Plan: 


Assessment: 83 year old female well known to me with history of malignant 

melanoma of lower left extremity with wide local excision and split-thickness 

graft. Patient has been admitted for cellulitis of the same site twice 

previously. I consulted patient in ED yesterday and recommended admission with 

IV antibiotics. Wound looks improved today. Patient's  is also currently 

admitted at [facility] and is patient's sole caregiver at this time. 








Plan:


Continue IV vanc


Monitor patient's progress over next 1-2 days, she may be ok to d/c is she 

continues to progress however  is sole caregiver and is also currently 

admitted. Patient will have to be d/c to [facility] if  needs to remain 

here.





Can apply lotion to site bid





Subjective: Patient doing well, denies pain associated with lower left 

extremity wound. Denies fever, chills.





Objective:


General: Pleasant, well-nourished woman laying in bed. No acute distress


HENT: Normocephalic, no gross hearing deficits, mucous membranes moist, pupils 

equal and round, no scleral icterus. Patient is legally blind.


Lungs:  No increased work of breathing.


MSK: Wound left lower extremity less warm, draining less than yesterday with 

less surrounding redness. Overlying epithelial tissue visible.


Skin: Warm and dry. 


Psych: Mood and affect normal


Neuro:  Grossly intact. No focal deficit appreciated.





01/24/19 10:31





01/24/19 11:05





01/24/19 16:06





Objective: 





 Vital Signs











Temp Pulse Resp BP Pulse Ox


 


 36.4 C   68   14   139/88 H  91 L


 


 01/24/19 08:00  01/24/19 08:00  01/24/19 08:00  01/24/19 08:00  01/24/19 08:00








 Laboratory Results





 01/24/19 05:00 





 01/24/19 05:00 





 











 01/23/19 01/24/19 01/25/19





 05:59 05:59 05:59


 


Intake Total  275 


 


Output Total  350 


 


Balance  -75 








 











PT  14.7 SEC (12.0-15.0)   01/23/19  16:15    


 


INR  1.13  (0.83-1.16)   01/23/19  16:15    














ICD10 Worksheet


Patient Problems: 


 Problems











Problem Status Onset


 


Wound infection Acute  


 


Bronchospasm Acute  


 


Chronic obstructive pulmonary disease with acute exacerbation Acute  


 


Elevated WBC count Acute  


 


Fever Acute  


 


Pneumonia Acute  


 


Severe sepsis Acute  


 


Weakness Acute

## 2019-01-24 NOTE — HOSPPROG
Hospitalist Progress Note


Assessment/Plan: 








# Recurrent LLE cellulitis: h/o MRSA and chronic leg wound s/p melanoma excision

/wound grafting


-well-known to Dr. Velazquez. Cont IV Vanc, wound care. No surgical intervention at 

this time. Can likely transition to orals tomorrow if improved





#Malignant melanoma: s/p wide excision as above, wound as above





#COPD: no e/o acute exacerbation





PAF: NSR currently. ASA, not on rate-controlling agent





# CAD: hx of stents, no c/o cp currentl. ASA, statin





# HTN: Lisinopril





# HLD: statin





#Diet: reg





#DVT ppx: Lovenox





Disp: inpatient admission for IV abx, wound cultures pending


Subjective: no fever, chills, sweats. No pain in left leg


Objective: 


 Vital Signs











Temp Pulse Resp BP Pulse Ox


 


 36.5 C   65   16   101/73   89 L


 


 01/24/19 04:00  01/24/19 04:00  01/24/19 04:00  01/24/19 04:00  01/24/19 04:00








 Laboratory Results





 01/24/19 05:00 





 01/24/19 05:00 





 











 01/23/19 01/24/19 01/25/19





 05:59 05:59 05:59


 


Intake Total  275 


 


Output Total  350 


 


Balance  -75 








 











PT  14.7 SEC (12.0-15.0)   01/23/19  16:15    


 


INR  1.13  (0.83-1.16)   01/23/19  16:15    














- Time Spent With Patient


Time Spent with Patient: greater than 35 minutes


Time Spent with Patient: Greater than 35 minutes spent on this patients care, 

greater than 50% of time spent counseling, educating, and coordinating care 

regarding the above mentioned plan.





- Physical Exam


Constitutional: obese


Eyes: PERRL, other (blind)


Cardiovascular: regular rate and rhythym


Respiratory: no respiratory distress


Gastrointestinal: normoactive bowel sounds


Genitourinary: No grewal in urethra


Musculoskeletal: other (left shin wound now with no drainage, mild surrounding 

erythema), No joint effusion


Neurologic: AAOx3


Psychiatric: interacting appropriately





ICD10 Worksheet


Patient Problems: 


 Problems











Problem Status Onset


 


Wound infection Acute  


 


Bronchospasm Acute  


 


Chronic obstructive pulmonary disease with acute exacerbation Acute  


 


Elevated WBC count Acute  


 


Fever Acute  


 


Pneumonia Acute  


 


Severe sepsis Acute  


 


Weakness Acute

## 2019-01-24 NOTE — PDMN
Medical Necessity


Medical necessity: Pt meets inpt criteria per MD order and MCG M-70, 

Cellulitis. 84 y/o admitted w/recurrent LLE cellulitis, hx of MRSA and chronic 

leg wound s/p melanoma excision/wound grafting as well as COPD, a fib, and 

blindness due to mac degen.  Surg consult/following, blood and wound cultures 

pending,  given pt's adv age, comorbid conditions, and high risk cellulitis 

anticipate>2MN for ongoing IV ABX's/eval/management of above.

## 2019-01-24 NOTE — ASMTCMCOM
CM Note

 

CM Note                       

Notes:

Pt admitted to hospital for wound infection. Pt lives at [facility] with her . He was also 


recently admitted for a syncopal episode but has been discharged. CM spoke w/dtr, would like pt to 

work with therapies here and is hoping will not need SNF. 



PT/OT recommend home care, pt is current with [facility]



DC Plan: [facility] + Homecare

 

Date Signed:  01/24/2019 03:57 PM

Electronically Signed By:Svitlana Garcia RN

## 2019-01-25 VITALS — SYSTOLIC BLOOD PRESSURE: 149 MMHG | DIASTOLIC BLOOD PRESSURE: 72 MMHG

## 2019-01-25 RX ADMIN — ENOXAPARIN SODIUM SCH MG: 100 INJECTION SUBCUTANEOUS at 09:11

## 2019-01-25 NOTE — ASMTCMCOM
CM Note

 

CM Note                       

Notes:

Spoke w/Jayda at [facility] re; pt's return. Jayda was very concerned that pt would be coming 

back with an open wound with mrsa. After several phone calls back and forth, CM went to verify that 


wound was scabbed over and not oozing. Jayda quickly became irate questioning every fact and 

ultimately stated that she would have to come tomorrow and see the wound and she was going to call 

Dr Velazquez. CM advised that after she did that she could then speak with pt's RN. 



Pt is medically stable to return to [facility].



DC Plan: [facility] + Homecare (RN/PT/OT)

 

Date Signed:  01/25/2019 04:31 PM

Electronically Signed By:Svitlana Garcia RN

## 2019-01-25 NOTE — HOSPPROG
Hospitalist Progress Note


Assessment/Plan: 








# Recurrent MRSA LLE cellulitis: chronic leg wound s/p melanoma excision/wound 

grafting


-well-known to Dr. Velazquez. 


-redness improved, no purulence


-change to doxycycline, FU Dr. Velazquez 1-2 weeks





#Malignant melanoma: s/p wide excision as above, wound as above





#COPD: no e/o acute exacerbation





PAF: NSR currently. ASA, not on rate-controlling agent





# CAD: hx of stents, no c/o cp currentl. ASA, statin





# HTN: Lisinopril





# HLD: statin





#Diet: reg





#DVT ppx: Lovenox








Disp: DC to [place] with Home care.


Subjective: feels good and wants to go home


Objective: 


 Vital Signs











Temp Pulse Resp BP Pulse Ox


 


 36.6 C   71   16   149/72 H  91 L


 


 01/25/19 14:44  01/25/19 14:44  01/25/19 14:44  01/25/19 14:44  01/25/19 14:44








 Laboratory Results





 01/24/19 05:00 





 01/24/19 05:00 





 











 01/24/19 01/25/19 01/26/19





 05:59 05:59 05:59


 


Intake Total 275 175 400


 


Output Total 350 1450 


 


Balance -75 -1275 400








 











PT  14.7 SEC (12.0-15.0)   01/23/19  16:15    


 


INR  1.13  (0.83-1.16)   01/23/19  16:15    














- Time Spent With Patient


Time Spent with Patient: greater than 35 minutes


Time Spent with Patient: Greater than 35 minutes spent on this patients care, 

greater than 50% of time spent counseling, educating, and coordinating care 

regarding the above mentioned plan.





- Physical Exam


Constitutional: no apparent distress


Eyes: other (blind)


Ears, Nose, Mouth, Throat: moist mucous membranes


Cardiovascular: regular rate and rhythym


Respiratory: no respiratory distress


Gastrointestinal: normoactive bowel sounds


Skin: other (left leg wound with less redness, no purulence)


Musculoskeletal: full muscle strength


Neurologic: AAOx3, CN II-XII Intact


Psychiatric: interacting appropriately





ICD10 Worksheet


Patient Problems: 


 Problems











Problem Status Onset


 


Wound infection Acute  


 


Bronchospasm Acute  


 


Chronic obstructive pulmonary disease with acute exacerbation Acute  


 


Elevated WBC count Acute  


 


Fever Acute  


 


Pneumonia Acute  


 


Severe sepsis Acute  


 


Weakness Acute

## 2019-01-25 NOTE — ASMTLACE
LACE

 

Length of stay for            Answers:  2 days                                

current admission                                                             

Acuity / Level of             Answers:  Yes                                   

Care: Did the patient                                                         

have an inpatient                                                             

admission?                                                                    

Comorbidities - select        Answers:  Chronic pulmonary disease             

all that apply                                                                

                                        Diabetes (uncontrolled or             

                                        controlled)                           

                                        Other                         Notes:  Atrial 

                                                                              fibrillation; Periphera
l 

                                                                              neuropathy

# of Emergency department     Answers:  5-8                                   

visits in the last 6                                                          

months                                                                        

Score: 13

 

Date Signed:  01/25/2019 05:33 PM

Electronically Signed By:Svitlana Garcia RN

## 2019-01-25 NOTE — GDS
DISCHARGE DIAGNOSES:  

1.  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
o chronic wound, status post prior melanoma excision/wound grafting.

2.  Malignant melanoma.

3.  Chronic obstructive pulmonary disease.

4.  Paroxysmal atrial fibrillation.

5.  Coronary artery disease.

6.  Hypertension.

7.  Hyperlipidemia.

8.  Macular degeneration.



CONSULTATIONS:  Dr. Velazquez.



HISTORY OF PRESENT ILLNESS:  An 83-year-old female with a history of malignant melanoma and MRSA infe
ction of left leg, presented on the recommendation of her home care nurse for concerns that her left 
leg wound is more red.  She is followed closely by Dr. Velazquez in wound clinic, who has agreed with thi
s assessment.



HOSPITAL COURSE BY PROBLEM:  

1.  Recurrent MRSA, left leg cellulitis:  History of a chronic leg wound after melanoma excision and 
grafting.  She was treated with IV vancomycin here with improvement and transitioned to doxycycline f
or a total of 7 days antibiotics.  She will follow up with Dr. Velazquez in 1-2 weeks.

2.  Malignant melanoma.  Plan as stated above.

3.  Chronic obstructive pulmonary disease.  No evidence of exacerbation.

4.  Paroxysmal atrial fibrillation, normal sinus rhythm, aspirin.  She is not on a rate controlling a
gent.

5.  Coronary artery disease, history of stents.  No chest pain, currently on aspirin and statin.

6.  Hypertension, lisinopril.

7.  Hyperlipidemia, statin.



DISPOSITION:  The patient is stable for discharge back to [place] with home care.



FOLLOWUP:  

1.  Dr. Velazquez in 1-2 weeks.  

2.  Time spent on discharge greater than 45 minutes coordinating with case management for discharge.





Job #:  [id number]

## 2019-01-25 NOTE — ASMTDCNOTE
Case Management Discharge

 

Discharge Order Complete?     Answers:  Yes                                   

Patient to Obtain             Answers:  Other                         Notes:  [facility]

Medications                                                                   

Transportation Arranged       Answers:  Family/Friends                        

Faxed Final Orders            Answers:  Yes                                   

Agency/Facility Transfer      Answers:  Yes                                   

Report Printed & Faxed to                                                     

Receiving Agency                                                              

Family Notified               Answers:  Yes                                   

Discharge Comments            

Notes:

D/w DM, final orders faxed. Jayda CHAVEZ at [facility] now willing to accept pt. Home care 

notified, and abx called in to pt's [facility] pharmacy on [address]. Dtr will transport home.

 

Date Signed:  01/25/2019 05:37 PM

Electronically Signed By:Svtilana Garcia RN

## 2019-01-25 NOTE — SOAPPROG
SOAP Progress Note


Assessment/Plan: 


Assessment/Plan: 84yo F h/o melanoma LLE s/p WLE and STSG. Admitted with 

cellulitis


IV antibiotics


Wound epithelialized


Wound care - lotion to site


Appreciate hospitalist management


Dispo: [facility] to [facility] as  (primary caregiver) is currently 

admitted at [facility] also





S: Patient doing well, denies pain associated with lower left extremity wound. 

Denies fever, chills.


O:


General: Pleasant, well-nourished woman laying in bed. No acute distress


HENT: Normocephalic, no gross hearing deficits, mucous membranes moist, pupils 

equal and round, no scleral icterus. Patient is legally blind.


Lungs:  No increased work of breathing.


MSK: Erythema improving. Overlying epithelial tissue visible.


Skin: Warm and dry. 


Psych: Mood and affect normal


Neuro:  Grossly intact


Objective: 





 Vital Signs











Temp Pulse Resp BP Pulse Ox


 


 36.3 C   75   18   108/63   87 L


 


 01/25/19 04:00  01/25/19 04:00  01/25/19 04:00  01/25/19 04:00  01/25/19 04:00








 Laboratory Results





 01/24/19 05:00 





 01/24/19 05:00 





 











 01/24/19 01/25/19 01/26/19





 05:59 05:59 05:59


 


Intake Total 275 175 


 


Output Total 350 1450 


 


Balance -75 -1275 








 











PT  14.7 SEC (12.0-15.0)   01/23/19  16:15    


 


INR  1.13  (0.83-1.16)   01/23/19  16:15    














ICD10 Worksheet


Patient Problems: 


 Problems











Problem Status Onset


 


Wound infection Acute  


 


Bronchospasm Acute  


 


Chronic obstructive pulmonary disease with acute exacerbation Acute  


 


Elevated WBC count Acute  


 


Fever Acute  


 


Pneumonia Acute  


 


Severe sepsis Acute  


 


Weakness Acute

## 2019-01-25 NOTE — PDIAF
- Diagnosis


Diagnosis: Left leg wound cellulitis


Code Status: Full Code





- Medication Management


Discharge Medications: electronically signed and located in the Home Medication 

List.





- Orders


Services needed: Home Care, Registered Nurse, Physical Therapy, Occupational 

Therapy


Home Care Face to Face: I certify that this patient was under my care and that 

I had the required face-to-face encounter meeting the encounter requirements on 

the discharge day.  My findings support the fact that the patient is homebound 

as defined in


Home Care Face to Face Continued: CMS Chapter 7 Medicare Benefits Manual 30.1.1

, The condition of the patient is such that there exists a normal inability to 

leave home and consequently, leaving home would require a considerable and 

taxing effort.


Isolation Type: Contact Isolation


Diet Recommendation: no restrictions on diet


Diet Texture: Regular Texture Diet





- Follow Up Care


Current Providers and Referrals: 


Braden Hebert MD [Primary Care Provider] - As per Instructions


Shannan Velazquez MD [Medical Doctor] - follow up in 2 weeks